# Patient Record
Sex: MALE | Race: WHITE | Employment: OTHER | URBAN - METROPOLITAN AREA
[De-identification: names, ages, dates, MRNs, and addresses within clinical notes are randomized per-mention and may not be internally consistent; named-entity substitution may affect disease eponyms.]

---

## 2017-01-05 ENCOUNTER — HOSPITAL ENCOUNTER (OUTPATIENT)
Dept: PHYSICAL THERAPY | Age: 72
Discharge: HOME | End: 2017-01-05
Attending: ORTHOPAEDIC SURGERY
Payer: MEDICARE

## 2017-01-05 NOTE — PROGRESS NOTES
PHYSICAL THERAPY    Patient unable to attend second appointment this week due to his schedule.     Tania Valladares, PT, DPT

## 2017-01-06 ENCOUNTER — HOSPITAL ENCOUNTER (OUTPATIENT)
Dept: PHYSICAL THERAPY | Age: 72
Discharge: HOME | End: 2017-01-06
Attending: ORTHOPAEDIC SURGERY
Payer: MEDICARE

## 2017-01-06 PROCEDURE — 97016 VASOPNEUMATIC DEVICE THERAPY: CPT

## 2017-01-06 PROCEDURE — 97110 THERAPEUTIC EXERCISES: CPT

## 2017-01-06 NOTE — PROGRESS NOTES
Samuel Pereira   (:1945) [address] at  [facility]  [address] [place] [address], [place], [address] [place] [address]  [phone]   [phone]         Outpatient PHYSICAL THERAPY: Daily Note  Fall Risk Score: 2 (? 5 = High Risk)    ICD-10: Treatment Diagnosis: complete rotator cuff tear or rupture of left shoulder, not specified as traumatic (M75.122)  ICD-10: Treatment Diagnosis 2: pain in left shoulder (M25.512)  ICD-10: Treatment Diagnosis 3: complete rotator cuff tear or rupture of right shoulder, not specified as traumatic (M75.121)  ICD-10: Treatment Diagnosis 4: pain in right shoulder (M25.511)  DATE: 2017  REFERRING PHYSICIAN: Rajinder Ferrell MD MD Orders: Evaluate and treat  Return Physician Appointment: 2017  MEDICAL/REFERRING DIAGNOSIS: Complete rotator cuff tear or rupture of left shoulder, not specified as traumatic [M75.122]  DATE OF ONSET: 2016   PRIOR LEVEL OF FUNCTION: independent  PRECAUTIONS: full massive rotator cuff tear- supraspinatus/ infraspinatus  ALLERGIES: No Known Allergies Patient denies allergy to latex, adhesives or creams. ASSESSMENT:  ?????? ? ? This section established at most recent assessment?????????? Patient is a 80year old male presenting to physical therapy with complaints of L shoulder pain and decreased functional mobility. Patient had MRI performed on 16 which indicated massive full thickness tear of supraspinatus and infraspinatus with 5 cm retraction, moderate degeneration and hypertrophy of AC joint and high riding humeral head- per chart review. Patient reports no incident of trauma or injury. He received an injection in his L shoulder in the end of September which he reports helping with pain and ROM. Patient states the injection is wearing off now and he has some increased pain.  He has at times had pain 10/10 with quick overhead motions, but currently reports his pain being controlled because he knows what to do or not do with his L UE. Now, patient rates pain 0/10 at rest up to 4/10 with overhead reach. Patient presents with increased pain, decreased strength, decreased ROM, decreased flexibility, impaired posture, impaired overhead reach, decreased activity tolerance, and overall impaired functional mobility. Patient is a good candidate for skilled physical therapy interventions to include manual therapy, therapeutic exercise, postural re-education, body mechanics training, and pain modalities as needed. Patient has been seen for 17 sessions of physical therapy from 10/28/16 to 12/29/16. He reports his L shoulder feeling much better since starting therapy with improvements in mobility, activity tolerance, and pain. We recently added his R shoulder into treatments and he reports minimal change thus far. Patient is motivated and using bilateral UEs as able at home and reports no interest in surgery at this time. Patient with improvements in strength, ROM, overall mobility, and activity tolerance. He has met many goals since starting therapy and is progressing with the rest. Patient should benefit from continued skilled therapy to address remaining strength and ROM deficits for bilateral shoulders. PROBLEM LIST (Impairments causing functional limitations):  1. Decreased Strength affecting function  2. Decreased ADL/Functional Activities  3. Decreased Flexibility/joint mobility  4. Decreased transfer abilities  5. Increased Pain affecting function  6. Decreased Activity tolerance   7. Decreased Pacing skills  8. Decreased Work Simplification/Energy Conservation techniques  GOALS: (Goals have been discussed and agreed upon with patient.)  SHORT-TERM FUNCTIONAL GOALS: Time Frame: 10/28/16 to 11/11/16   1. Patient will be independent with HEP to improve upright posture, overhead reach, and L UE ROM. -GOAL MET  2.  Patient will report no more than 0/10 L shoulder pain at rest in order to demonstrate improved self pain control and tolerance. -GOAL MET   3. Patient will improve upright posture including decreased forward head and rounded shoulders with improved L shoulder position in order to improve overhead reach. -GOAL MET  DISCHARGE GOALS: Time Frame: 12/29/16 to 1/27/17  1. Patient will improve gross overhead active ROM to 150 degrees in order to improve functional mobility and independence with ADLs. -ONGOING  2. Patient will report no more than 4/10 L shoulder pain with overhead motions in order to demonstrate improved activity tolerance. -GOAL MET   3. Patient will report minimal to no pain with driving in order to demonstrate improve functional mobility. -ONGOING (L improved to minimal pain, R with significant pain- 12/29/16)  4. 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 -ONGOING (reports moderate pain 12/29/16)  5. Patient will improve Disability of the Arm, Shoulder, and Hand score to 18/55 from 22/55. -ONGOING (scord 27/55 on 12/29/16)  6. NEW GOAL 12/29/16: Patient will report no more than 7/10 R shoulder pain with overhead motions in order to demonstrate improved activity tolerance. 7. NEW GOAL 12/29/16: Patient will report overall decreased R shoulder pain with improve mobility in order to return to prior functional level. REHABILITATION POTENTIAL FOR STATED GOALS: Michael Aburto OF CARE:  INTERVENTIONS PLANNED: (Benefits and precautions of physical therapy have been discussed with the patient.)  1. cold  2. heat  3. home exercise program (HEP)  4. manual therapy  5. neuromuscular re-education/strengthening  6. range of motion: active/assisted/passive  7. therapeutic activities  8. therapeutic exercise/strengthening  9. ultrasound  TREATMENT PLAN EFFECTIVE DATES: 12/29/16 to 1/27/17  FREQUENCY/DURATION: Follow patient 2 times a week for 4 weeks to address above goals.    Regarding Bakari Lorenz's therapy, I certify that the treatment plan above will be carried out by a therapist or under their direction. Thank you for this referral,  Ruchi Willard PT     Referring Physician Signature: Rajinder Ferrell MD          Date                       SUBJECTIVE:  \"My right shoulder is still bothering me more and its really hard to raise it up. I'm just waiting on it to start getting better like the left one. \"     Lo Redo from Dr. Carolyn Reddy: add in Right shoulder physical therapy 2 times a week for 4 weeks (12/21/16)    History of Present Injury/Illness (Reason for Referral): Patient is a 80year old male presenting to physical therapy with complaints of L shoulder pain and decreased functional mobility. Patient had MRI performed on 9/2/16 which indicated massive full thickness tear of supraspinatus and infraspinatus with 5 cm retraction, moderate degeneration and hypertrophy of AC joint and high riding humeral head- per chart review. Patient reports no incident of trauma or injury. He received an injection in his L shoulder in the end of September which he reports helping with pain and ROM. Patient states the injection is wearing off now and he has some increased pain. He has at times had pain 10/10 with quick overhead motions, but currently reports his pain being controlled because he knows what to do or not do with his L UE. Now, patient rates pain 0/10 at rest up to 4/10 with overhead reach. Patient presents with increased pain, decreased strength, decreased ROM, decreased flexibility, impaired posture, impaired overhead reach, decreased activity tolerance, and overall impaired functional mobility. Present Symptoms: aching, burning, tingling   Pain Intensity 1: 4  Pain Location 1: Shoulder  Pain Orientation 1: Left  Pain Intensity 2: 7  Pain Location 2: Shoulder  Pain Orientation 2: Right  Dominant Side: left  Past Medical History: Mr. Alana Goel  has a past medical history of Acute maxillary sinusitis; Acute pharyngitis;  Aftercare for long-term (current) use of antiplatelets/antithrombotics (9/29/2016); Allergic rhinitis; Anemia (9/29/2016); Backache; CAD (coronary artery disease) (9/29/2016); Carotid artery disease ([facility] (9/29/2016); Decreased libido; Diabetes ([facility] (9/29/2016); Dizziness and giddiness; ED (erectile dysfunction) (9/29/2016); Encounter for monitoring testosterone replacement therapy (9/29/2016); Fatigue; Fever blister; GERD (gastroesophageal reflux disease); Glaucoma (9/29/2016); Hearing difficulty of right ear (9/29/2016); High cholesterol; High risk medication use (9/29/2016); Hypertension (9/29/2016); Hyponatremia (9/29/2016); IBS (irritable bowel syndrome) (9/29/2016); Intertrigo (9/29/2016); Irregular heart beat (9/29/2016); Left shoulder pain; Mouth pain; Obstructive sleep apnea of adult (9/29/2016); Osteoporosis (9/29/2016); Palpitations; Plantar wart of left foot (9/29/2016); Rotator cuff tear; Sinus bradycardia (9/29/2016); Sinusitis; Skin infection; Sore throat; Special screening for malignant neoplasm of prostate; Subclinical hyperthyroidism; and Tinea corporis. He also  has a past surgical history that includes urological.    Current Medications:   Current Outpatient Prescriptions on File Prior to Encounter   Medication Sig Dispense Refill    latanoprost (XALATAN) 0.005 % ophthalmic solution USE 1 DROP IN BOTH EYES EVERY EVENING  4    denosumab (PROLIA) 60 mg/mL injection 60 mg by SubCUTAneous route. Last dose 9/19/2016      bisoprolol (ZEBETA) 5 mg tablet Take 0.5-1 Tabs by mouth daily. Indications: HYPERTENSION 90 Tab 3    metoclopramide HCl (REGLAN) 10 mg tablet Take 1 Tab by mouth daily. Takes once a day at bedtime 90 Tab 3    rosuvastatin (CRESTOR) 40 mg tablet Take 0.5-1 Tabs by mouth nightly. Indications: HYPERLIPIDEMIA 90 Tab 3    omega 3-dha-epa-fish oil (FISH OIL) 900-1,400 mg cpDR Take 2 Caps by mouth daily for 90 days.  Last dose 01/14/10   Indications: ARTERIOSCLEROTIC VASCULAR DISEASE PREVENTION 180 Cap 3    calcium citrate-vitamin D3 (CITRACAL + D) tablet One am(with centrum) and two pm   Indications: OSTEOPOROSIS 90 Tab 3    esomeprazole (NEXIUM) 20 mg capsule Take  by mouth daily. OTC, in the AM and before dinner to lower stomach acid.  sildenafil citrate (VIAGRA) 100 mg tablet One tablet every day for ED      magnesium oxide (MAG-OX) 400 mg tablet One tablet daily to prevent or treat low magnesium      timolol (TIMOPTIC) 0.25 % ophthalmic solution Administer 1 Drop to both eyes daily.  ranitidine (ZANTAC) 300 mg tablet Take 300 mg by mouth daily. Takes at bedtime       LATANOPROST (XALATAN OP) Apply 1 Drop to eye daily. One drop to each eye       aspirin 81 mg Tab Take 81 mg by mouth daily. Last dose 01/14/10       multivitamin (ONE A DAY) tablet Take 1 Tab by mouth daily. Centrum silver last dose 01/14/10        No current facility-administered medications on file prior to encounter. Date Last Reviewed: 1/6/2017    Social History/Home Situation: Patient lives in a private residence with his wife who can help him out as needed. He reports being modified independent at home with increased time needed for ADLs. Work/Activity History: Part time . OBJECTIVE:    Outcome Measure: Tool Used: Disabilities of the Arm, Shoulder and Hand (DASH) Questionnaire - Quick Version  Score:  Initial: 22/55  Most Recent: 23/55 (Date: (11/23/16 )                          31/55 (Date: 12/22/16)                          27/55 (Date: 12/29/16)   Interpretation of Score: The DASH is designed to measure the activities of daily living in person's with upper extremity dysfunction or pain. Each section is scored on a 1-5 scale, 5 representing the greatest disability. The scores of each section are added together for a total score of 55. Score 11 12-19 20-28 29-37 38-45 46-54 55   Modifier CH CI CJ CK CL CM CN     ?  Carrying, Moving, and Handling Objects:     - CURRENT STATUS: CJ - 20%-39% impaired, limited or restricted    - GOAL STATUS:  CI - 1%-19% impaired, limited or restricted    - D/C STATUS:  ---------------To be determined---------------      Observation/Postural and Gait Assessment: In standing: patient with forward head, rounded shoulders, L shoulder elevated with increase tension in L upper trapezius, L scapula depressed and protraction. Slumped posture in sitting- able to correct with cuing, but difficulty holding posture due to decreased endurance of postural muscles. Palpation:  Moderate tenderness with moderate tightness noted in L upper trapezius. Moderate tenderness to palpation of L teres minor and pectoralis muscles. Moderate tightness and tenderness in R upper trapezius and levator scapula. Moderate to significant tenderness R teres minor. ROM:   PROM Left (degrees) Right (degrees)   Shoulder Flexion 140 140   Shoulder Abduction 130 130   Shoulder Internal Rotation  80 85   Functional Internal Rotation L1 L5   Shoulder External Rotation 57 50   Functional External Rotation C7 (from C5) T2     Strength: Motion Tested Left   (*/5) Right  (*/5)   Shoulder Flexion 4 (from 4-) 4- (from 4)   Scaption 4 (from 4-) 4- (from 4)   Shoulder Abduction 5 (from 4+) 4-   Shoulder Internal Rotation  4+ 4   Shoulder External Rotation 2+ 3- (from 4-)   Elbow Flexion 5 5   Elbow Extension 5 5    (in lbs): arm at side with elbow flexed to 90 degrees 70 65     Special Tests:  Not formally assessed due to MRI results with massive rotator cuff tear and increased L shoulder pain with certain positions. Passive Accessory Motion Testing: Significant tightness with inferior and posterior glenohumeral joint mobilizations. Neurological Screen:  Myotomes: Key muscle strength testing for bilateral UE is WNL. Dermatomes: Sensation testing through bilateral UE for light touch is intact from C3 to T2.   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 Balance: Sitting and standing balance grossly intact. TREATMENT:    (In addition to Assessment/Re-Assessment sessions the following treatments were rendered)  Therapeutic Exercise: (30 Minutes): Exercises per grid below to improve mobility, strength and coordination. Required minimal verbal cues to promote proper body alignment, promote proper body posture and promote proper body mechanics. Progressed resistance, range, repetitions and complexity of movement as indicated. Date:  12/27/16 Date:  12/29/16 Date:  1/6/17   Activity/Exercise Parameters Parameters Parameters   Scapular retractions X 20 --- X 20    Wall slides X 15 reps  No lift, 2 x 10 Wall slides, no lift, x 15   UBE Level 3, 3 minutes forward, 3 minutes backward Level 4, 4 minutes forward, 4 minutes backwards Level 4, 4 minutes forward, 4 minutes backwards   Finger ladder X 5 reps each shoulder x 10 sec hold at top ---  ---    Wall push ups 2 x 15 X 20 X 30    Band rows Green x 30 reps  Blue band, 2 x 10 Blue and red band, 2 x 10   Band low row Green x 30 reps  Green band, 2 x 10  Blue band, 3 x 10   Shoulder flexion Seated in chair both hands on blue band x 10 reps 5 sec hold  Ball roll on wall, bilateral, 10 x 5 seconds Ball roll on wall, bilateral, 15 x 5 seconds   Shoulder scaption Active 2x10 reps shoulder height  --- ---   Bicep curl Cable cord 7.5 lb. --- Cable cord, 7.5 lbs, each arm to fatigue   Tricep extension Cable cord 7.5 lb.  2x15  --- Cable cord, 7.5 lbs, each arm to fatigue   Noni ----- X 5 minutes X 5 minutes   Band internal rotation Red, 2 x 10 each L green, 2 x 10  R red, 2 x 10 ---   Band external rotation Bilateral, hands close, gentle contraction, 2 x 10 Bilateral hands close, gentle contraction 2x10 reps  Bilateral, hands close, gentle contraction, red, x 20   Wand external rotation --- Bilateral, 15 x 5 seconds each Bilateral, back against wall, 15 x 5 seconds each         Manual Therapy (      ): none today    Therapeutic Modalities: for pain/ edema:                  Right Shoulder Cold  Type:  (vasopneumatic compression)  Duration : 15 minutes  Patient Position: Sitting     Left Shoulder Cold  Type: Cold/ice pack  Duration : 15 minutes  Patient Position: Sitting                                                                      HEP: As above; handouts given to patient for all exercises. ______________________________________________________________________________________________________    Treatment Assessment: Patient tolerated all exercises well today. Some increased R shoulder pain with activities, but reported 7/10 pain following all exercises. Patient progressing well overall, but continues to have increased R shoulder pain throughout the day. Progression/Medical Necessity:   · Patient is expected to demonstrate progress in strength, range of motion, coordination and functional technique to increase independence with ADLs  and improve safety during overhead reach, ADLs, and recreational activities. · Skilled intervention continues to be required due to increased L shoulder pain with overhead reach, ADLs, and recreational activities. Compliance with Program/Exercises: Compliant most of the time. Reason for Continuation of Services/Other Comments:  · Patient continues to require skilled intervention due to increased L shoulder pain with decreased ROM hindering activity tolerance, functional mobility, and overall quality of life. Recommendations/Intent for next treatment session: \"Treatment next visit will focus on advancements to more challenging activities\". Manual therapy and modalities as needed for pain control and ROM.  Progress postural exercises as tolerated. Strengthening and compensatory movements as needed.     Re-certification 58/82/95: continue physical therapy 2 times a week for 4 weeks for bilateral shoulders    Total Treatment Duration: 55 minutes   PT Patient Time In/Time Out  Time In: 0905  Time Out: Lexie Rubin 211, PT

## 2017-01-09 ENCOUNTER — HOSPITAL ENCOUNTER (OUTPATIENT)
Dept: PHYSICAL THERAPY | Age: 72
Discharge: HOME | End: 2017-01-09
Attending: ORTHOPAEDIC SURGERY
Payer: MEDICARE

## 2017-01-09 PROCEDURE — 97110 THERAPEUTIC EXERCISES: CPT

## 2017-01-09 PROCEDURE — 97140 MANUAL THERAPY 1/> REGIONS: CPT

## 2017-01-09 NOTE — PROGRESS NOTES
Dion Frausto   [phone]) [phone] at  [address], [address]  [phone]   [phone]         Outpatient PHYSICAL THERAPY: Daily Note  Fall Risk Score: 2 (? 5 = High Risk)    ICD-10: Treatment Diagnosis: complete rotator cuff tear or rupture of left shoulder, not specified as traumatic (M75.122)  ICD-10: Treatment Diagnosis 2: pain in left shoulder (M25.512)  ICD-10: Treatment Diagnosis 3: complete rotator cuff tear or rupture of right shoulder, not specified as traumatic (M75.121)  ICD-10: Treatment Diagnosis 4: pain in right shoulder (M25.511)  DATE: 2017  REFERRING PHYSICIAN: Diogo Angel MD MD Orders: Evaluate and treat  Return Physician Appointment: 2017  MEDICAL/REFERRING DIAGNOSIS: Complete rotator cuff tear or rupture of left shoulder, not specified as traumatic [M75.122]  DATE OF ONSET: 2016   PRIOR LEVEL OF FUNCTION: independent  PRECAUTIONS: full massive rotator cuff tear- supraspinatus/ infraspinatus  ALLERGIES: No Known Allergies Patient denies allergy to latex, adhesives or creams. ASSESSMENT:  ?????? ? ? This section established at most recent assessment?????????? Patient is a 80year old male presenting to physical therapy with complaints of L shoulder pain and decreased functional mobility. Patient had MRI performed on 16 which indicated massive full thickness tear of supraspinatus and infraspinatus with 5 cm retraction, moderate degeneration and hypertrophy of AC joint and high riding humeral head- per chart review. Patient reports no incident of trauma or injury. He received an injection in his L shoulder in the end of September which he reports helping with pain and ROM. Patient states the injection is wearing off now and he has some increased pain.  He has at times had pain 10/10 with quick overhead motions, but currently reports his pain being controlled because he knows what to do or not do with his L UE. Now, patient rates pain 0/10 at rest up to 4/10 with overhead reach. Patient presents with increased pain, decreased strength, decreased ROM, decreased flexibility, impaired posture, impaired overhead reach, decreased activity tolerance, and overall impaired functional mobility. Patient is a good candidate for skilled physical therapy interventions to include manual therapy, therapeutic exercise, postural re-education, body mechanics training, and pain modalities as needed. Patient has been seen for 17 sessions of physical therapy from 10/28/16 to 12/29/16. He reports his L shoulder feeling much better since starting therapy with improvements in mobility, activity tolerance, and pain. We recently added his R shoulder into treatments and he reports minimal change thus far. Patient is motivated and using bilateral UEs as able at home and reports no interest in surgery at this time. Patient with improvements in strength, ROM, overall mobility, and activity tolerance. He has met many goals since starting therapy and is progressing with the rest. Patient should benefit from continued skilled therapy to address remaining strength and ROM deficits for bilateral shoulders. PROBLEM LIST (Impairments causing functional limitations):  1. Decreased Strength affecting function  2. Decreased ADL/Functional Activities  3. Decreased Flexibility/joint mobility  4. Decreased transfer abilities  5. Increased Pain affecting function  6. Decreased Activity tolerance   7. Decreased Pacing skills  8. Decreased Work Simplification/Energy Conservation techniques  GOALS: (Goals have been discussed and agreed upon with patient.)  SHORT-TERM FUNCTIONAL GOALS: Time Frame: 10/28/16 to 11/11/16   1. Patient will be independent with HEP to improve upright posture, overhead reach, and L UE ROM. -GOAL MET  2.  Patient will report no more than 0/10 L shoulder pain at rest in order to demonstrate improved self pain control and tolerance. -GOAL MET   3. Patient will improve upright posture including decreased forward head and rounded shoulders with improved L shoulder position in order to improve overhead reach. -GOAL MET  DISCHARGE GOALS: Time Frame: 12/29/16 to 1/27/17  1. Patient will improve gross overhead active ROM to 150 degrees in order to improve functional mobility and independence with ADLs. -ONGOING  2. Patient will report no more than 4/10 L shoulder pain with overhead motions in order to demonstrate improved activity tolerance. -GOAL MET   3. Patient will report minimal to no pain with driving in order to demonstrate improve functional mobility. -ONGOING (L improved to minimal pain, R with significant pain- 12/29/16)  4. 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 -ONGOING (reports moderate pain 12/29/16)  5. Patient will improve Disability of the Arm, Shoulder, and Hand score to 18/55 from 22/55. -ONGOING (scord 27/55 on 12/29/16)  6. NEW GOAL 12/29/16: Patient will report no more than 7/10 R shoulder pain with overhead motions in order to demonstrate improved activity tolerance. 7. NEW GOAL 12/29/16: Patient will report overall decreased R shoulder pain with improve mobility in order to return to prior functional level. REHABILITATION POTENTIAL FOR STATED GOALS: Hali Gonzalez OF CARE:  INTERVENTIONS PLANNED: (Benefits and precautions of physical therapy have been discussed with the patient.)  1. cold  2. heat  3. home exercise program (HEP)  4. manual therapy  5. neuromuscular re-education/strengthening  6. range of motion: active/assisted/passive  7. therapeutic activities  8. therapeutic exercise/strengthening  9. ultrasound  TREATMENT PLAN EFFECTIVE DATES: 12/29/16 to 1/27/17  FREQUENCY/DURATION: Follow patient 2 times a week for 4 weeks to address above goals.    Regarding Cherry Lorenz's therapy, I certify that the treatment plan above will be carried out by a therapist or under their direction. Thank you for this referral,  Blaze Fofana PTA     Referring Physician Signature: Sloan Lawrence MD          Date                       SUBJECTIVE:  Pt. Stated he had increased pain today due to weather. New Script from Dr. Camryn Reid: add in Right shoulder physical therapy 2 times a week for 4 weeks (12/21/16)    History of Present Injury/Illness (Reason for Referral): Patient is a 80year old male presenting to physical therapy with complaints of L shoulder pain and decreased functional mobility. Patient had MRI performed on 9/2/16 which indicated massive full thickness tear of supraspinatus and infraspinatus with 5 cm retraction, moderate degeneration and hypertrophy of AC joint and high riding humeral head- per chart review. Patient reports no incident of trauma or injury. He received an injection in his L shoulder in the end of September which he reports helping with pain and ROM. Patient states the injection is wearing off now and he has some increased pain. He has at times had pain 10/10 with quick overhead motions, but currently reports his pain being controlled because he knows what to do or not do with his L UE. Now, patient rates pain 0/10 at rest up to 4/10 with overhead reach. Patient presents with increased pain, decreased strength, decreased ROM, decreased flexibility, impaired posture, impaired overhead reach, decreased activity tolerance, and overall impaired functional mobility. Present Symptoms: aching, burning, tingling   Pain Intensity 1: 4  Pain Location 1: Shoulder  Pain Orientation 1: Left  Pain Intensity 2: 7  Pain Location 2: Shoulder  Pain Orientation 2: Right  Dominant Side: left  Past Medical History: Mr. Alfonso Egan  has a past medical history of Acute maxillary sinusitis; Acute pharyngitis; Aftercare for long-term (current) use of antiplatelets/antithrombotics (9/29/2016); Allergic rhinitis; Anemia (9/29/2016);  Backache; CAD (coronary artery disease) (9/29/2016); Carotid artery disease ([facility] (9/29/2016); Decreased libido; [facility] (9/29/2016); Dizziness and giddiness; ED (erectile dysfunction) (9/29/2016); Encounter for monitoring testosterone replacement therapy (9/29/2016); Fatigue; Fever blister; GERD (gastroesophageal reflux disease); Glaucoma (9/29/2016); Hearing difficulty of right ear (9/29/2016); High cholesterol; High risk medication use (9/29/2016); Hypertension (9/29/2016); Hyponatremia (9/29/2016); IBS (irritable bowel syndrome) (9/29/2016); Intertrigo (9/29/2016); Irregular heart beat (9/29/2016); Left shoulder pain; Mouth pain; Obstructive sleep apnea of adult (9/29/2016); Osteoporosis (9/29/2016); Palpitations; Plantar wart of left foot (9/29/2016); Rotator cuff tear; Sinus bradycardia (9/29/2016); Sinusitis; Skin infection; Sore throat; Special screening for malignant neoplasm of prostate; Subclinical hyperthyroidism; and Tinea corporis. He also  has a past surgical history that includes urological.    Current Medications:   Current Outpatient Prescriptions on File Prior to Encounter   Medication Sig Dispense Refill    latanoprost (XALATAN) 0.005 % ophthalmic solution USE 1 DROP IN BOTH EYES EVERY EVENING  4    denosumab (PROLIA) 60 mg/mL injection 60 mg by SubCUTAneous route. Last dose 9/19/2016      bisoprolol (ZEBETA) 5 mg tablet Take 0.5-1 Tabs by mouth daily. Indications: HYPERTENSION 90 Tab 3    metoclopramide HCl (REGLAN) 10 mg tablet Take 1 Tab by mouth daily. Takes once a day at bedtime 90 Tab 3    rosuvastatin (CRESTOR) 40 mg tablet Take 0.5-1 Tabs by mouth nightly. Indications: HYPERLIPIDEMIA 90 Tab 3    omega 3-dha-epa-fish oil (FISH OIL) 900-1,400 mg cpDR Take 2 Caps by mouth daily for 90 days.  Last dose 01/14/10   Indications: ARTERIOSCLEROTIC VASCULAR DISEASE PREVENTION 180 Cap 3    calcium citrate-vitamin D3 (CITRACAL + D) tablet One am(with centrum) and two pm   Indications: OSTEOPOROSIS 90 Tab 3    esomeprazole (NEXIUM) 20 mg capsule Take  by mouth daily. OTC, in the AM and before dinner to lower stomach acid.  sildenafil citrate (VIAGRA) 100 mg tablet One tablet every day for ED      magnesium oxide (MAG-OX) 400 mg tablet One tablet daily to prevent or treat low magnesium      timolol (TIMOPTIC) 0.25 % ophthalmic solution Administer 1 Drop to both eyes daily.  ranitidine (ZANTAC) 300 mg tablet Take 300 mg by mouth daily. Takes at bedtime       LATANOPROST (XALATAN OP) Apply 1 Drop to eye daily. One drop to each eye       aspirin 81 mg Tab Take 81 mg by mouth daily. Last dose 01/14/10       multivitamin (ONE A DAY) tablet Take 1 Tab by mouth daily. Centrum silver last dose 01/14/10        No current facility-administered medications on file prior to encounter. Date Last Reviewed: 1/9/2017    Social History/Home Situation: Patient lives in a private residence with his wife who can help him out as needed. He reports being modified independent at home with increased time needed for ADLs. Work/Activity History: Part time . OBJECTIVE:    Outcome Measure: Tool Used: Disabilities of the Arm, Shoulder and Hand (DASH) Questionnaire - Quick Version  Score:  Initial: 22/55  Most Recent: 23/55 (Date: (11/23/16 )                          31/55 (Date: 12/22/16)                          27/55 (Date: 12/29/16)   Interpretation of Score: The DASH is designed to measure the activities of daily living in person's with upper extremity dysfunction or pain. Each section is scored on a 1-5 scale, 5 representing the greatest disability. The scores of each section are added together for a total score of 55. Score 11 12-19 20-28 29-37 38-45 46-54 55   Modifier CH CI CJ CK CL CM CN     ?  Carrying, Moving, and Handling Objects:     - CURRENT STATUS: CJ - 20%-39% impaired, limited or restricted    - GOAL STATUS:  CI - 1%-19% impaired, limited or restricted    - D/C STATUS:  ---------------To be determined---------------      Observation/Postural and Gait Assessment: In standing: patient with forward head, rounded shoulders, L shoulder elevated with increase tension in L upper trapezius, L scapula depressed and protraction. Slumped posture in sitting- able to correct with cuing, but difficulty holding posture due to decreased endurance of postural muscles. Palpation:  Moderate tenderness with moderate tightness noted in L upper trapezius. Moderate tenderness to palpation of L teres minor and pectoralis muscles. Moderate tightness and tenderness in R upper trapezius and levator scapula. Moderate to significant tenderness R teres minor. ROM:   PROM Left (degrees) Right (degrees)   Shoulder Flexion 140 140   Shoulder Abduction 130 130   Shoulder Internal Rotation  80 85   Functional Internal Rotation L1 L5   Shoulder External Rotation 57 50   Functional External Rotation C7 (from C5) T2     Strength: Motion Tested Left   (*/5) Right  (*/5)   Shoulder Flexion 4 (from 4-) 4- (from 4)   Scaption 4 (from 4-) 4- (from 4)   Shoulder Abduction 5 (from 4+) 4-   Shoulder Internal Rotation  4+ 4   Shoulder External Rotation 2+ 3- (from 4-)   Elbow Flexion 5 5   Elbow Extension 5 5    (in lbs): arm at side with elbow flexed to 90 degrees 70 65     Special Tests:  Not formally assessed due to MRI results with massive rotator cuff tear and increased L shoulder pain with certain positions. Passive Accessory Motion Testing: Significant tightness with inferior and posterior glenohumeral joint mobilizations. Neurological Screen:  Myotomes: Key muscle strength testing for bilateral UE is WNL. Dermatomes: Sensation testing through bilateral UE for light touch is intact from C3 to T2.   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 Balance: Sitting and standing balance grossly intact. TREATMENT:    (In addition to Assessment/Re-Assessment sessions the following treatments were rendered)  Therapeutic Exercise: ( ):x 45 mins  Exercises per grid below to improve mobility, strength and coordination. Required minimal verbal cues to promote proper body alignment, promote proper body posture and promote proper body mechanics. Progressed resistance, range, repetitions and complexity of movement as indicated. Date:  1/9/17 Date:  12/29/16 Date:  1/6/17   Activity/Exercise Parameters Parameters Parameters   Scapular retractions X 20 --- X 20    Wall slides X 15 reps  No lift, 2 x 10 Wall slides, no lift, x 15   UBE Level 4, 4 minutes forward, 4 minutes backward Level 4, 4 minutes forward, 4 minutes backwards Level 4, 4 minutes forward, 4 minutes backwards   Finger ladder X 5 reps each shoulder x 10 sec hold at top ---  ---    Wall push ups 2 x 15 X 20 X 30    Band rows Blue  x 30 reps  Blue band, 2 x 10 Blue and red band, 2 x 10   Band low row Blue  x 30 reps  Green band, 2 x 10  Blue band, 3 x 10   Shoulder flexion Seated in chair both hands on blue band x 10 reps 5 sec hold  Ball roll on wall, bilateral, 10 x 5 seconds Ball roll on wall, bilateral, 15 x 5 seconds   Shoulder scaption Active 2x10 reps shoulder height  --- ---   Bicep curl Cable cord 7.5 lb. --- Cable cord, 7.5 lbs, each arm to fatigue   Tricep extension Cable bar 10 lb.  2x15  --- Cable cord, 7.5 lbs, each arm to fatigue   Noni ----- X 5 minutes X 5 minutes   Band internal rotation Green , 2 x 10 e 20 reps  L green, 2 x 10  R red, 2 x 10 ---   Band external rotation Bilateral, hands close, gentle contraction, 2 x 10 Bilateral hands close, gentle contraction 2x10 reps  Bilateral, hands close, gentle contraction, red, x 20   Wand external rotation bileteral 20 reps 5 sec hold each Bilateral, 15 x 5 seconds each Bilateral, back against wall, 15 x 5 seconds each         Manual Therapy (      ): x 10 mins Pt. Received PROM to bilateral shoulders in all planes to tolerance. Gentle oscillation and distraction performed to bilateral UE's to increase range of motion and decrease pain. Therapeutic Modalities: for pain/ edema: Pt. Received hot pack x 8 mins to bilateral shoulders initially and ice pack to bilateral shoulders after session today x 8 mins. HEP: As above; handouts given to patient for all exercises. ______________________________________________________________________________________________________    Treatment Assessment:Pt. Was compliant with all exercises and rated pain the same as in the beginning. Progression/Medical Necessity:   · Patient is expected to demonstrate progress in strength, range of motion, coordination and functional technique to increase independence with ADLs  and improve safety during overhead reach, ADLs, and recreational activities. · Skilled intervention continues to be required due to increased L shoulder pain with overhead reach, ADLs, and recreational activities. Compliance with Program/Exercises: Compliant most of the time. Reason for Continuation of Services/Other Comments:  · Patient continues to require skilled intervention due to increased L shoulder pain with decreased ROM hindering activity tolerance, functional mobility, and overall quality of life. Recommendations/Intent for next treatment session: \"Treatment next visit will focus on advancements to more challenging activities\". Manual therapy and modalities as needed for pain control and ROM. Progress postural exercises as tolerated.  Strengthening and compensatory movements as needed.     Re-certification 03/05/[birth year]: continue physical therapy 2 times a week for 4 weeks for bilateral shoulders    Total Treatment Duration: 55 minutes   PT Patient Time In/Time Out  Time In: 0900  Time Out: [address], PTA

## 2017-01-11 ENCOUNTER — HOSPITAL ENCOUNTER (OUTPATIENT)
Dept: PHYSICAL THERAPY | Age: 72
Discharge: HOME | End: 2017-01-11
Attending: ORTHOPAEDIC SURGERY
Payer: MEDICARE

## 2017-01-11 PROCEDURE — 97016 VASOPNEUMATIC DEVICE THERAPY: CPT

## 2017-01-11 PROCEDURE — 97110 THERAPEUTIC EXERCISES: CPT

## 2017-01-11 PROCEDURE — 97140 MANUAL THERAPY 1/> REGIONS: CPT

## 2017-01-11 NOTE — PROGRESS NOTES
Cuco Andrews   [phone]) [address]  at  [address]  [phone]   [phone]         Outpatient PHYSICAL THERAPY: Daily Note  Fall Risk Score: 2 (? 5 = High Risk)    ICD-10: Treatment Diagnosis: complete rotator cuff tear or rupture of left shoulder, not specified as traumatic (M75.122)  ICD-10: Treatment Diagnosis 2: pain in left shoulder (M25.512)  ICD-10: Treatment Diagnosis 3: complete rotator cuff tear or rupture of right shoulder, not specified as traumatic (M75.121)  ICD-10: Treatment Diagnosis 4: pain in right shoulder (M25.511)  DATE: 2017  REFERRING PHYSICIAN: Agus Carias MD MD Orders: Evaluate and treat  Return Physician Appointment: 2017  MEDICAL/REFERRING DIAGNOSIS: Complete rotator cuff tear or rupture of left shoulder, not specified as traumatic [M75.122]  DATE OF ONSET: 2016   PRIOR LEVEL OF FUNCTION: independent  PRECAUTIONS: full massive rotator cuff tear- supraspinatus/ infraspinatus  ALLERGIES: No Known Allergies Patient denies allergy to latex, adhesives or creams. ASSESSMENT:  ?????? ? ? This section established at most recent assessment?????????? Patient is a 80year old male presenting to physical therapy with complaints of L shoulder pain and decreased functional mobility. Patient had MRI performed on 16 which indicated massive full thickness tear of supraspinatus and infraspinatus with 5 cm retraction, moderate degeneration and hypertrophy of AC joint and high riding humeral head- per chart review. Patient reports no incident of trauma or injury. He received an injection in his L shoulder in the end of September which he reports helping with pain and ROM. Patient states the injection is wearing off now and he has some increased pain.  He has at times had pain 10/10 with quick overhead motions, but currently reports his pain being controlled because he knows what to do or not do with his L UE. Now, patient rates pain 0/10 at rest up to 4/10 with overhead reach. Patient presents with increased pain, decreased strength, decreased ROM, decreased flexibility, impaired posture, impaired overhead reach, decreased activity tolerance, and overall impaired functional mobility. Patient is a good candidate for skilled physical therapy interventions to include manual therapy, therapeutic exercise, postural re-education, body mechanics training, and pain modalities as needed. Patient has been seen for 17 sessions of physical therapy from 10/28/16 to 12/29/16. He reports his L shoulder feeling much better since starting therapy with improvements in mobility, activity tolerance, and pain. We recently added his R shoulder into treatments and he reports minimal change thus far. Patient is motivated and using bilateral UEs as able at home and reports no interest in surgery at this time. Patient with improvements in strength, ROM, overall mobility, and activity tolerance. He has met many goals since starting therapy and is progressing with the rest. Patient should benefit from continued skilled therapy to address remaining strength and ROM deficits for bilateral shoulders. PROBLEM LIST (Impairments causing functional limitations):  1. Decreased Strength affecting function  2. Decreased ADL/Functional Activities  3. Decreased Flexibility/joint mobility  4. Decreased transfer abilities  5. Increased Pain affecting function  6. Decreased Activity tolerance   7. Decreased Pacing skills  8. Decreased Work Simplification/Energy Conservation techniques  GOALS: (Goals have been discussed and agreed upon with patient.)  SHORT-TERM FUNCTIONAL GOALS: Time Frame: 10/28/16 to 11/11/16   1. Patient will be independent with HEP to improve upright posture, overhead reach, and L UE ROM. -GOAL MET  2.  Patient will report no more than 0/10 L shoulder pain at rest in order to demonstrate improved self pain control and tolerance. -GOAL MET   3. Patient will improve upright posture including decreased forward head and rounded shoulders with improved L shoulder position in order to improve overhead reach. -GOAL MET  DISCHARGE GOALS: Time Frame: 12/29/16 to 1/27/17  1. Patient will improve gross overhead active ROM to 150 degrees in order to improve functional mobility and independence with ADLs. -ONGOING  2. Patient will report no more than 4/10 L shoulder pain with overhead motions in order to demonstrate improved activity tolerance. -GOAL MET   3. Patient will report minimal to no pain with driving in order to demonstrate improve functional mobility. -ONGOING (L improved to minimal pain, R with significant pain- 12/29/16)  4. 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 -ONGOING (reports moderate pain 12/29/16)  5. Patient will improve Disability of the Arm, Shoulder, and Hand score to 18/55 from 22/55. -ONGOING (scord 27/55 on 12/29/16)  6. NEW GOAL 12/29/16: Patient will report no more than 7/10 R shoulder pain with overhead motions in order to demonstrate improved activity tolerance. 7. NEW GOAL 12/29/16: Patient will report overall decreased R shoulder pain with improve mobility in order to return to prior functional level. REHABILITATION POTENTIAL FOR STATED GOALS: Hali Gonzalez OF CARE:  INTERVENTIONS PLANNED: (Benefits and precautions of physical therapy have been discussed with the patient.)  1. cold  2. heat  3. home exercise program (HEP)  4. manual therapy  5. neuromuscular re-education/strengthening  6. range of motion: active/assisted/passive  7. therapeutic activities  8. therapeutic exercise/strengthening  9. ultrasound  TREATMENT PLAN EFFECTIVE DATES: 12/29/16 to 1/27/17  FREQUENCY/DURATION: Follow patient 2 times a week for 4 weeks to address above goals.    Regarding Cherry Lorenz's therapy, I certify that the treatment plan above will be carried out by a therapist or under their direction. Thank you for this referral,  Sherman Villatoro, GIULIANO     Referring Physician Signature: Mark Coburn MD          Date                       SUBJECTIVE:  \"My shoulders are still hurting. This right one is bothering me a lot. I worked last night and I noticed that it does not like to drive. I can keep my right hand on the bottom of the steering wheel, but turning any I have to use my left arm. \"    Jessi Emerson from Dr. Jessi Clay: add in Right shoulder physical therapy 2 times a week for 4 weeks (12/21/16)    History of Present Injury/Illness (Reason for Referral): Patient is a 80year old male presenting to physical therapy with complaints of L shoulder pain and decreased functional mobility. Patient had MRI performed on 9/2/16 which indicated massive full thickness tear of supraspinatus and infraspinatus with 5 cm retraction, moderate degeneration and hypertrophy of AC joint and high riding humeral head- per chart review. Patient reports no incident of trauma or injury. He received an injection in his L shoulder in the end of September which he reports helping with pain and ROM. Patient states the injection is wearing off now and he has some increased pain. He has at times had pain 10/10 with quick overhead motions, but currently reports his pain being controlled because he knows what to do or not do with his L UE. Now, patient rates pain 0/10 at rest up to 4/10 with overhead reach. Patient presents with increased pain, decreased strength, decreased ROM, decreased flexibility, impaired posture, impaired overhead reach, decreased activity tolerance, and overall impaired functional mobility.   Present Symptoms: aching, burning, tingling   Pain Intensity 1: 4  Pain Location 1: Shoulder  Pain Orientation 1: Left  Pain Intensity 2: 8  Pain Location 2: Shoulder  Pain Orientation 2: Right  Dominant Side: left  Past Medical History: Mr. Carmel Hawthorne  has a past medical history of Acute maxillary sinusitis; Acute pharyngitis; Aftercare for long-term (current) use of antiplatelets/antithrombotics (9/29/2016); Allergic rhinitis; Anemia (9/29/2016); Backache; CAD (coronary artery disease) (9/29/2016); Carotid artery disease ([facility] (9/29/2016); Decreased libido; Diabetes ([facility] (9/29/2016); Dizziness and giddiness; ED (erectile dysfunction) (9/29/2016); Encounter for monitoring testosterone replacement therapy (9/29/2016); Fatigue; Fever blister; GERD (gastroesophageal reflux disease); Glaucoma (9/29/2016); Hearing difficulty of right ear (9/29/2016); High cholesterol; High risk medication use (9/29/2016); Hypertension (9/29/2016); Hyponatremia (9/29/2016); IBS (irritable bowel syndrome) (9/29/2016); Intertrigo (9/29/2016); Irregular heart beat (9/29/2016); Left shoulder pain; Mouth pain; Obstructive sleep apnea of adult (9/29/2016); Osteoporosis (9/29/2016); Palpitations; Plantar wart of left foot (9/29/2016); Rotator cuff tear; Sinus bradycardia (9/29/2016); Sinusitis; Skin infection; Sore throat; Special screening for malignant neoplasm of prostate; Subclinical hyperthyroidism; and Tinea corporis. He also  has a past surgical history that includes urological.    Current Medications:   Current Outpatient Prescriptions on File Prior to Encounter   Medication Sig Dispense Refill    latanoprost (XALATAN) 0.005 % ophthalmic solution USE 1 DROP IN BOTH EYES EVERY EVENING  4    denosumab (PROLIA) 60 mg/mL injection 60 mg by SubCUTAneous route. Last dose 9/19/2016      bisoprolol (ZEBETA) 5 mg tablet Take 0.5-1 Tabs by mouth daily. Indications: HYPERTENSION 90 Tab 3    metoclopramide HCl (REGLAN) 10 mg tablet Take 1 Tab by mouth daily. Takes once a day at bedtime 90 Tab 3    rosuvastatin (CRESTOR) 40 mg tablet Take 0.5-1 Tabs by mouth nightly. Indications: HYPERLIPIDEMIA 90 Tab 3    omega 3-dha-epa-fish oil (FISH OIL) 900-1,400 mg cpDR Take 2 Caps by mouth daily for 90 days.  Last dose 01/14/10   Indications: ARTERIOSCLEROTIC VASCULAR DISEASE PREVENTION 180 Cap 3    calcium citrate-vitamin D3 (CITRACAL + D) tablet One am(with centrum) and two pm   Indications: OSTEOPOROSIS 90 Tab 3    esomeprazole (NEXIUM) 20 mg capsule Take  by mouth daily. OTC, in the AM and before dinner to lower stomach acid.  sildenafil citrate (VIAGRA) 100 mg tablet One tablet every day for ED      magnesium oxide (MAG-OX) 400 mg tablet One tablet daily to prevent or treat low magnesium      timolol (TIMOPTIC) 0.25 % ophthalmic solution Administer 1 Drop to both eyes daily.  ranitidine (ZANTAC) 300 mg tablet Take 300 mg by mouth daily. Takes at bedtime       LATANOPROST (XALATAN OP) Apply 1 Drop to eye daily. One drop to each eye       aspirin 81 mg Tab Take 81 mg by mouth daily. Last dose 01/14/10       multivitamin (ONE A DAY) tablet Take 1 Tab by mouth daily. Centrum silver last dose 01/14/10        No current facility-administered medications on file prior to encounter. Date Last Reviewed: 1/11/2017    Social History/Home Situation: Patient lives in a private residence with his wife who can help him out as needed. He reports being modified independent at home with increased time needed for ADLs. Work/Activity History: Part time . OBJECTIVE:    Outcome Measure: Tool Used: Disabilities of the Arm, Shoulder and Hand (DASH) Questionnaire - Quick Version  Score:  Initial: 22/55  Most Recent: 23/55 (Date: (11/23/16 )                          31/55 (Date: 12/22/16)                          27/55 (Date: 12/29/16)   Interpretation of Score: The DASH is designed to measure the activities of daily living in person's with upper extremity dysfunction or pain. Each section is scored on a 1-5 scale, 5 representing the greatest disability. The scores of each section are added together for a total score of 55.     Score 11 12-19 20-28 29-37 38-45 46-54 55   Modifier CH CI CJ CK CL CM CN ? Carrying, Moving, and Handling Objects:     - CURRENT STATUS: CJ - 20%-39% impaired, limited or restricted    - GOAL STATUS:  CI - 1%-19% impaired, limited or restricted    - D/C STATUS:  ---------------To be determined---------------      Observation/Postural and Gait Assessment: In standing: patient with forward head, rounded shoulders, L shoulder elevated with increase tension in L upper trapezius, L scapula depressed and protraction. Slumped posture in sitting- able to correct with cuing, but difficulty holding posture due to decreased endurance of postural muscles. Palpation:  Moderate tenderness with moderate tightness noted in L upper trapezius. Moderate tenderness to palpation of L teres minor and pectoralis muscles. Moderate tightness and tenderness in R upper trapezius and levator scapula. Moderate to significant tenderness R teres minor. ROM:   PROM Left (degrees) Right (degrees)   Shoulder Flexion 140 140   Shoulder Abduction 130 130   Shoulder Internal Rotation  80 85   Functional Internal Rotation L1 L5   Shoulder External Rotation 57 50   Functional External Rotation C7 (from C5) T2     Strength: Motion Tested Left   (*/5) Right  (*/5)   Shoulder Flexion 4 (from 4-) 4- (from 4)   Scaption 4 (from 4-) 4- (from 4)   Shoulder Abduction 5 (from 4+) 4-   Shoulder Internal Rotation  4+ 4   Shoulder External Rotation 2+ 3- (from 4-)   Elbow Flexion 5 5   Elbow Extension 5 5    (in lbs): arm at side with elbow flexed to 90 degrees 70 65     Special Tests:  Not formally assessed due to MRI results with massive rotator cuff tear and increased L shoulder pain with certain positions. Passive Accessory Motion Testing: Significant tightness with inferior and posterior glenohumeral joint mobilizations. Neurological Screen:  Myotomes: Key muscle strength testing for bilateral UE is WNL.   Dermatomes: Sensation testing through bilateral UE for light touch is intact from C3 to T2.  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 Balance: Sitting and standing balance grossly intact. TREATMENT:    (In addition to Assessment/Re-Assessment sessions the following treatments were rendered)  Therapeutic Exercise: (25 Minutes): Exercises per grid below to improve mobility, strength and coordination. Required minimal verbal cues to promote proper body alignment, promote proper body posture and promote proper body mechanics. Progressed resistance, range, repetitions and complexity of movement as indicated. Date:  1/9/17 Date:  1/11/17 Date:  1/6/17   Activity/Exercise Parameters Parameters Parameters   Scapular retractions X 20 --- X 20    Wall slides X 15 reps  No lift, 2 x 10 Wall slides, no lift, x 15   UBE Level 4, 4 minutes forward, 4 minutes backward Level 4, 3 minutes forward, 3 minutes backwards Level 4, 4 minutes forward, 4 minutes backwards   Finger ladder X 5 reps each shoulder x 10 sec hold at top ---  ---    Wall push ups 2 x 15 X 20 X 30    Band rows Blue  x 30 reps  Blue band and red, 2 x 10 Blue and red band, 2 x 10   Band low row Blue  x 30 reps  Green band, 2 x 10  Blue band, 3 x 10   Shoulder flexion Seated in chair both hands on blue band x 10 reps 5 sec hold  Yellow band x 10 on L, tan band x 10 on R Ball roll on wall, bilateral, 15 x 5 seconds   Shoulder scaption Active 2x10 reps shoulder height  --- ---   Bicep curl Cable cord 7.5 lb. --- Cable cord, 7.5 lbs, each arm to fatigue   Tricep extension Cable bar 10 lb.  2x15  --- Cable cord, 7.5 lbs, each arm to fatigue   Pulley ----- --- X 5 minutes   Band internal rotation Green , 2 x 10 e 20 reps  --- ---   Band external rotation Bilateral, hands close, gentle contraction, 2 x 10 Bilateral hands close, gentle contraction 2x10 reps  Bilateral, hands close, gentle contraction, red, x 20   Wand external rotation bileteral 20 reps 5 sec hold each Bilateral, 10 x 5 seconds each Bilateral, back against wall, 15 x 5 seconds each         Manual Therapy (      20 minutes): Patient in supine: gentle oscillations and grade I-II glenohumeral joint mobilizations to decrease pain and improved relaxation; gentle glenohumeral distraction. Therapeutic Modalities: for pain/ edema:   Right Shoulder Heat  Type: Moist pack  Duration : 10 minutes  Patient Position: Sitting     Left Shoulder Heat  Type: Moist pack  Duration : 10 minutes  Patient Position: Sitting        Right Shoulder Cold  Type:  (vasopneumatic compression)  Duration : 15 minutes  Patient Position: Sitting     Left Shoulder Cold  Type: Cold/ice pack  Duration : 15 minutes  Patient Position: Sitting                                                                      HEP: As above; handouts given to patient for all exercises. ______________________________________________________________________________________________________    Treatment Assessment: Patient with good tolerance for exercises. Some increased pain with activities, but reported decreased pain at end of treatment. Good response to gentle oscillations and grade I-II glenohumeral joint mobilizations on R to decrease pain. Pain rated 3/10 on L and 5/10 on R at end of session. Progression/Medical Necessity:   · Patient is expected to demonstrate progress in strength, range of motion, coordination and functional technique to increase independence with ADLs  and improve safety during overhead reach, ADLs, and recreational activities. · Skilled intervention continues to be required due to increased L shoulder pain with overhead reach, ADLs, and recreational activities.   Compliance with Program/Exercises: Compliant most of the time. Reason for Continuation of Services/Other Comments:  · Patient continues to require skilled intervention due to increased L shoulder pain with decreased ROM hindering activity tolerance, functional mobility, and overall quality of life. Recommendations/Intent for next treatment session: \"Treatment next visit will focus on advancements to more challenging activities\". Continue manual therapy for R shoulder for pain control as tolerated. Progress exercises, postural, and functional mobility as able.     Re-certification 06/01/13: continue physical therapy 2 times a week for 4 weeks for bilateral shoulders    Total Treatment Duration: 70 minutes   PT Patient Time In/Time Out  Time In: 3470  Time Out: Warren Kasper PT

## 2017-01-16 ENCOUNTER — HOSPITAL ENCOUNTER (OUTPATIENT)
Dept: PHYSICAL THERAPY | Age: 72
Discharge: HOME | End: 2017-01-16
Attending: ORTHOPAEDIC SURGERY
Payer: MEDICARE

## 2017-01-16 PROCEDURE — 97140 MANUAL THERAPY 1/> REGIONS: CPT

## 2017-01-16 PROCEDURE — 97110 THERAPEUTIC EXERCISES: CPT

## 2017-01-16 NOTE — PROGRESS NOTES
Merary Davies   (:1945) [id number] at  [address], [address]  [phone]   [phone]         Outpatient PHYSICAL THERAPY: Daily Note  Fall Risk Score: 2 (? 5 = High Risk)    ICD-10: Treatment Diagnosis: complete rotator cuff tear or rupture of left shoulder, not specified as traumatic (M75.122)  ICD-10: Treatment Diagnosis 2: pain in left shoulder (M25.512)  ICD-10: Treatment Diagnosis 3: complete rotator cuff tear or rupture of right shoulder, not specified as traumatic (M75.121)  ICD-10: Treatment Diagnosis 4: pain in right shoulder (M25.511)  DATE: 2017  REFERRING PHYSICIAN: Kristin Webster MD MD Orders: Evaluate and treat  Return Physician Appointment: 2017  MEDICAL/REFERRING DIAGNOSIS: Complete rotator cuff tear or rupture of left shoulder, not specified as traumatic [M75.122]  DATE OF ONSET: 2016   PRIOR LEVEL OF FUNCTION: independent  PRECAUTIONS: full massive rotator cuff tear- supraspinatus/ infraspinatus  ALLERGIES: No Known Allergies Patient denies allergy to latex, adhesives or creams. ASSESSMENT:  ?????? ? ? This section established at most recent assessment?????????? Patient is a 80year old male presenting to physical therapy with complaints of L shoulder pain and decreased functional mobility. Patient had MRI performed on 16 which indicated massive full thickness tear of supraspinatus and infraspinatus with 5 cm retraction, moderate degeneration and hypertrophy of AC joint and high riding humeral head- per chart review. Patient reports no incident of trauma or injury. He received an injection in his L shoulder in the end of September which he reports helping with pain and ROM. Patient states the injection is wearing off now and he has some increased pain.  He has at times had pain 10/10 with quick overhead motions, but currently reports his pain being controlled because he knows what to do or not do with his L UE. Now, patient rates pain 0/10 at rest up to 4/10 with overhead reach. Patient presents with increased pain, decreased strength, decreased ROM, decreased flexibility, impaired posture, impaired overhead reach, decreased activity tolerance, and overall impaired functional mobility. Patient is a good candidate for skilled physical therapy interventions to include manual therapy, therapeutic exercise, postural re-education, body mechanics training, and pain modalities as needed. Patient has been seen for 17 sessions of physical therapy from 10/28/16 to 12/29/16. He reports his L shoulder feeling much better since starting therapy with improvements in mobility, activity tolerance, and pain. We recently added his R shoulder into treatments and he reports minimal change thus far. Patient is motivated and using bilateral UEs as able at home and reports no interest in surgery at this time. Patient with improvements in strength, ROM, overall mobility, and activity tolerance. He has met many goals since starting therapy and is progressing with the rest. Patient should benefit from continued skilled therapy to address remaining strength and ROM deficits for bilateral shoulders. PROBLEM LIST (Impairments causing functional limitations):  1. Decreased Strength affecting function  2. Decreased ADL/Functional Activities  3. Decreased Flexibility/joint mobility  4. Decreased transfer abilities  5. Increased Pain affecting function  6. Decreased Activity tolerance   7. Decreased Pacing skills  8. Decreased Work Simplification/Energy Conservation techniques  GOALS: (Goals have been discussed and agreed upon with patient.)  SHORT-TERM FUNCTIONAL GOALS: Time Frame: 10/28/16 to 11/11/16   1. Patient will be independent with HEP to improve upright posture, overhead reach, and L UE ROM. -GOAL MET  2.  Patient will report no more than 0/10 L shoulder pain at rest in order to demonstrate improved self pain control and tolerance. -GOAL MET   3. Patient will improve upright posture including decreased forward head and rounded shoulders with improved L shoulder position in order to improve overhead reach. -GOAL MET  DISCHARGE GOALS: Time Frame: 12/29/16 to 1/27/17  1. Patient will improve gross overhead active ROM to 150 degrees in order to improve functional mobility and independence with ADLs. -ONGOING  2. Patient will report no more than 4/10 L shoulder pain with overhead motions in order to demonstrate improved activity tolerance. -GOAL MET   3. Patient will report minimal to no pain with driving in order to demonstrate improve functional mobility. -ONGOING (L improved to minimal pain, R with significant pain- 12/29/16)  4. 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 -ONGOING (reports moderate pain 12/29/16)  5. Patient will improve Disability of the Arm, Shoulder, and Hand score to 18/55 from 22/55. -ONGOING (scord 27/55 on 12/29/16)  6. NEW GOAL 12/29/16: Patient will report no more than 7/10 R shoulder pain with overhead motions in order to demonstrate improved activity tolerance. 7. NEW GOAL 12/29/16: Patient will report overall decreased R shoulder pain with improve mobility in order to return to prior functional level. REHABILITATION POTENTIAL FOR STATED GOALS: [place] OF CARE:  INTERVENTIONS PLANNED: (Benefits and precautions of physical therapy have been discussed with the patient.)  1. cold  2. heat  3. home exercise program (HEP)  4. manual therapy  5. neuromuscular re-education/strengthening  6. range of motion: active/assisted/passive  7. therapeutic activities  8. therapeutic exercise/strengthening  9. ultrasound  TREATMENT PLAN EFFECTIVE DATES: 12/29/16 to 1/27/17  FREQUENCY/DURATION: Follow patient 2 times a week for 4 weeks to address above goals.    Regarding Marva Lorenz's therapy, I certify that the treatment plan above will be carried out by a therapist or under their direction. Thank you for this referral,  Suzie Lucero PTA     Referring Physician Signature: Kristin Webster MD          Date                       SUBJECTIVE:  Pt. Stated not much improvement with shoulders today. New Script from Dr. Arianna Paez: add in Right shoulder physical therapy 2 times a week for 4 weeks (12/21/16)    History of Present Injury/Illness (Reason for Referral): Patient is a 80year old male presenting to physical therapy with complaints of L shoulder pain and decreased functional mobility. Patient had MRI performed on 9/2/16 which indicated massive full thickness tear of supraspinatus and infraspinatus with 5 cm retraction, moderate degeneration and hypertrophy of AC joint and high riding humeral head- per chart review. Patient reports no incident of trauma or injury. He received an injection in his L shoulder in the end of September which he reports helping with pain and ROM. Patient states the injection is wearing off now and he has some increased pain. He has at times had pain 10/10 with quick overhead motions, but currently reports his pain being controlled because he knows what to do or not do with his L UE. Now, patient rates pain 0/10 at rest up to 4/10 with overhead reach. Patient presents with increased pain, decreased strength, decreased ROM, decreased flexibility, impaired posture, impaired overhead reach, decreased activity tolerance, and overall impaired functional mobility. Present Symptoms: aching, burning, tingling   Pain Intensity 1: 4  Pain Location 1: Shoulder  Pain Orientation 1: Left  Pain Intensity 2: 6  Pain Location 2: Shoulder  Pain Orientation 2: Right  Dominant Side: left  Past Medical History: Mr. Martinez Mane  has a past medical history of Acute maxillary sinusitis; Acute pharyngitis; Aftercare for long-term (current) use of antiplatelets/antithrombotics (9/29/2016); Allergic rhinitis; Anemia (9/29/2016);  Backache; CAD (coronary artery disease) (9/29/2016); Carotid artery disease ([facility] (9/29/2016); Decreased libido; Diabetes ([facility] (9/29/2016); Dizziness and giddiness; ED (erectile dysfunction) (9/29/2016); Encounter for monitoring testosterone replacement therapy (9/29/2016); Fatigue; Fever blister; GERD (gastroesophageal reflux disease); Glaucoma (9/29/2016); Hearing difficulty of right ear (9/29/2016); High cholesterol; High risk medication use (9/29/2016); Hypertension (9/29/2016); Hyponatremia (9/29/2016); IBS (irritable bowel syndrome) (9/29/2016); Intertrigo (9/29/2016); Irregular heart beat (9/29/2016); Left shoulder pain; Mouth pain; Obstructive sleep apnea of adult (9/29/2016); Osteoporosis (9/29/2016); Palpitations; Plantar wart of left foot (9/29/2016); Rotator cuff tear; Sinus bradycardia (9/29/2016); Sinusitis; Skin infection; Sore throat; Special screening for malignant neoplasm of prostate; Subclinical hyperthyroidism; and Tinea corporis. He also  has a past surgical history that includes urological.    Current Medications:   Current Outpatient Prescriptions on File Prior to Encounter   Medication Sig Dispense Refill    latanoprost (XALATAN) 0.005 % ophthalmic solution USE 1 DROP IN BOTH EYES EVERY EVENING  4    denosumab (PROLIA) 60 mg/mL injection 60 mg by SubCUTAneous route. Last dose 9/19/2016      bisoprolol (ZEBETA) 5 mg tablet Take 0.5-1 Tabs by mouth daily. Indications: HYPERTENSION 90 Tab 3    metoclopramide HCl (REGLAN) 10 mg tablet Take 1 Tab by mouth daily. Takes once a day at bedtime 90 Tab 3    rosuvastatin (CRESTOR) 40 mg tablet Take 0.5-1 Tabs by mouth nightly. Indications: HYPERLIPIDEMIA 90 Tab 3    omega 3-dha-epa-fish oil (FISH OIL) 900-1,400 mg cpDR Take 2 Caps by mouth daily for 90 days.  Last dose 01/14/10   Indications: ARTERIOSCLEROTIC VASCULAR DISEASE PREVENTION 180 Cap 3    calcium citrate-vitamin D3 (CITRACAL + D) tablet One am(with centrum) and two pm   Indications: OSTEOPOROSIS 90 Tab 3    esomeprazole (NEXIUM) 20 mg capsule Take  by mouth daily. OTC, in the AM and before dinner to lower stomach acid.  sildenafil citrate (VIAGRA) 100 mg tablet One tablet every day for ED      magnesium oxide (MAG-OX) 400 mg tablet One tablet daily to prevent or treat low magnesium      timolol (TIMOPTIC) 0.25 % ophthalmic solution Administer 1 Drop to both eyes daily.  ranitidine (ZANTAC) 300 mg tablet Take 300 mg by mouth daily. Takes at bedtime       LATANOPROST (XALATAN OP) Apply 1 Drop to eye daily. One drop to each eye       aspirin 81 mg Tab Take 81 mg by mouth daily. Last dose 01/14/10       multivitamin (ONE A DAY) tablet Take 1 Tab by mouth daily. Centrum silver last dose 01/14/10        No current facility-administered medications on file prior to encounter. Date Last Reviewed: 1/16/2017    Social History/Home Situation: Patient lives in a private residence with his wife who can help him out as needed. He reports being modified independent at home with increased time needed for ADLs. Work/Activity History: Part time . OBJECTIVE:    Outcome Measure: Tool Used: Disabilities of the Arm, Shoulder and Hand (DASH) Questionnaire - Quick Version  Score:  Initial: 22/55  Most Recent: 23/55 (Date: (11/23/16 )                          31/55 (Date: 12/22/16)                          27/55 (Date: 12/29/16)   Interpretation of Score: The DASH is designed to measure the activities of daily living in person's with upper extremity dysfunction or pain. Each section is scored on a 1-5 scale, 5 representing the greatest disability. The scores of each section are added together for a total score of 55. Score 11 12-19 20-28 29-37 38-45 46-54 55   Modifier CH CI CJ CK CL CM CN     ?  Carrying, Moving, and Handling Objects:     - CURRENT STATUS: CJ - 20%-39% impaired, limited or restricted    - GOAL STATUS:  CI - 1%-19% impaired, limited or restricted    - D/C STATUS:  ---------------To be determined---------------      Observation/Postural and Gait Assessment: In standing: patient with forward head, rounded shoulders, L shoulder elevated with increase tension in L upper trapezius, L scapula depressed and protraction. Slumped posture in sitting- able to correct with cuing, but difficulty holding posture due to decreased endurance of postural muscles. Palpation:  Moderate tenderness with moderate tightness noted in L upper trapezius. Moderate tenderness to palpation of L teres minor and pectoralis muscles. Moderate tightness and tenderness in R upper trapezius and levator scapula. Moderate to significant tenderness R teres minor. ROM:   PROM Left (degrees) Right (degrees)   Shoulder Flexion 140 140   Shoulder Abduction 130 130   Shoulder Internal Rotation  80 85   Functional Internal Rotation L1 L5   Shoulder External Rotation 57 50   Functional External Rotation C7 (from C5) T2     Strength: Motion Tested Left   (*/5) Right  (*/5)   Shoulder Flexion 4 (from 4-) 4- (from 4)   Scaption 4 (from 4-) 4- (from 4)   Shoulder Abduction 5 (from 4+) 4-   Shoulder Internal Rotation  4+ 4   Shoulder External Rotation 2+ 3- (from 4-)   Elbow Flexion 5 5   Elbow Extension 5 5    (in lbs): arm at side with elbow flexed to 90 degrees 70 65     Special Tests:  Not formally assessed due to MRI results with massive rotator cuff tear and increased L shoulder pain with certain positions. Passive Accessory Motion Testing: Significant tightness with inferior and posterior glenohumeral joint mobilizations. Neurological Screen:  Myotomes: Key muscle strength testing for bilateral UE is WNL. Dermatomes: Sensation testing through bilateral UE for light touch is intact from C3 to T2.   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 Balance: Sitting and standing balance grossly intact. TREATMENT:    (In addition to Assessment/Re-Assessment sessions the following treatments were rendered)  Therapeutic Exercise: ( ): x 45 mins Exercises per grid below to improve mobility, strength and coordination. Required minimal verbal cues to promote proper body alignment, promote proper body posture and promote proper body mechanics. Progressed resistance, range, repetitions and complexity of movement as indicated. Date:  1/9/17 Date:  1/11/17 Date:  1/16/17   Activity/Exercise Parameters Parameters Parameters   Scapular retractions X 20 --- X 20    Wall slides X 15 reps  No lift, 2 x 10 Wall slides, no lift, x 15   UBE Level 4, 4 minutes forward, 4 minutes backward Level 4, 3 minutes forward, 3 minutes backwards Level 5,  4 minutes forward, 4 minutes backwards   Finger ladder X 5 reps each shoulder x 10 sec hold at top ---  X 10 reps flexion & abduction    Wall push ups 2 x 15 X 20 X 30    Band rows Blue  x 30 reps  Blue band and red, 2 x 10 Blue band, 3 x 10   Band low row Blue  x 30 reps  Green band, 2 x 10  Blue band, 3 x 10   Shoulder flexion Seated in chair both hands on blue band x 10 reps 5 sec hold  Yellow band x 10 on L, tan band x 10 on R Yellow band x 10 reps on left tan band x 10 on right   Shoulder scaption Active 2x10 reps shoulder height  --- Active 3 x 10 shoulder height   Bicep curl Cable cord 7.5 lb. --- Cable cord, 7.5 lbs, each arm to fatigue   Tricep extension Cable bar 10 lb.  2x15  --- Cable cord, 7.5 lbs, each arm to fatigue   Pulley ----- --- X 5 minutes   Band internal rotation Green , 2 x 10 e 20 reps  --- Green band 2x10 reps    Band external rotation Bilateral, hands close, gentle contraction, 2 x 10 Bilateral hands close, gentle contraction 2x10 reps  Bilateral, hands close, gentle contraction, red, x 20   Wand external rotation bileteral 20 reps 5 sec hold each Bilateral, 10 x 5 seconds each Bilateral, back against wall, 15 x 5 seconds each         Manual Therapy (      10 minutes): Patient received PROM in all planes to tolerance to bilateral shoulders. Gentle distraction and oscillation to decrease pain and increase range of motion. Therapeutic Modalities: for pain/ edema: x 10 mins Pt. Received ice pack to bilateral shoulders in supine. HEP: As above; handouts given to patient for all exercises. ______________________________________________________________________________________________________    Treatment Assessment: Patient stated his pain was the same on the right shoulder and improved pain on the left to 1/10. Progression/Medical Necessity:   · Patient is expected to demonstrate progress in strength, range of motion, coordination and functional technique to increase independence with ADLs  and improve safety during overhead reach, ADLs, and recreational activities. · Skilled intervention continues to be required due to increased L shoulder pain with overhead reach, ADLs, and recreational activities. Compliance with Program/Exercises: Compliant most of the time. Reason for Continuation of Services/Other Comments:  · Patient continues to require skilled intervention due to increased L shoulder pain with decreased ROM hindering activity tolerance, functional mobility, and overall quality of life. Recommendations/Intent for next treatment session: \"Treatment next visit will focus on advancements to more challenging activities\". Continue manual therapy for R shoulder for pain control as tolerated. Progress exercises, postural, and functional mobility as able.     Re-certification 36/85/84: continue physical therapy 2 times a week for 4 weeks for bilateral shoulders    Total Treatment Duration: 65  minutes   PT Patient Time In/Time Out  Time In: 0900  Time Out: [address], PTA

## 2017-01-18 ENCOUNTER — HOSPITAL ENCOUNTER (OUTPATIENT)
Dept: PHYSICAL THERAPY | Age: 72
Discharge: HOME | End: 2017-01-18
Attending: ORTHOPAEDIC SURGERY
Payer: MEDICARE

## 2017-01-18 PROCEDURE — 97016 VASOPNEUMATIC DEVICE THERAPY: CPT

## 2017-01-18 PROCEDURE — 97140 MANUAL THERAPY 1/> REGIONS: CPT

## 2017-01-18 PROCEDURE — 97110 THERAPEUTIC EXERCISES: CPT

## 2017-01-18 NOTE — PROGRESS NOTES
Samuel Pereira   [phone] at  [facility]  [address] [place] [address], [place], [address] [place] [address]  [phone]   [phone]         Outpatient PHYSICAL THERAPY: Daily Note  Fall Risk Score: 2 (? 5 = High Risk)    ICD-10: Treatment Diagnosis: complete rotator cuff tear or rupture of left shoulder, not specified as traumatic (M75.122)  ICD-10: Treatment Diagnosis 2: pain in left shoulder (M25.512)  ICD-10: Treatment Diagnosis 3: complete rotator cuff tear or rupture of right shoulder, not specified as traumatic (M75.121)  ICD-10: Treatment Diagnosis 4: pain in right shoulder (M25.511)  DATE: 2017  REFERRING PHYSICIAN: Rajinder Ferrell MD MD Orders: Evaluate and treat  Return Physician Appointment: 2017  MEDICAL/REFERRING DIAGNOSIS: Complete rotator cuff tear or rupture of left shoulder, not specified as traumatic [M75.122]  DATE OF ONSET: 2016   PRIOR LEVEL OF FUNCTION: independent  PRECAUTIONS: full massive rotator cuff tear- supraspinatus/ infraspinatus  ALLERGIES: No Known Allergies Patient denies allergy to latex, adhesives or creams. ASSESSMENT:  ?????? ? ? This section established at most recent assessment?????????? Patient is a 80year old male presenting to physical therapy with complaints of L shoulder pain and decreased functional mobility. Patient had MRI performed on 16 which indicated massive full thickness tear of supraspinatus and infraspinatus with 5 cm retraction, moderate degeneration and hypertrophy of AC joint and high riding humeral head- per chart review. Patient reports no incident of trauma or injury. He received an injection in his L shoulder in the end of September which he reports helping with pain and ROM. Patient states the injection is wearing off now and he has some increased pain.  He has at times had pain 10/10 with quick overhead motions, but currently reports his pain being controlled because he knows what to do or not do with his L UE. Now, patient rates pain 0/10 at rest up to 4/10 with overhead reach. Patient presents with increased pain, decreased strength, decreased ROM, decreased flexibility, impaired posture, impaired overhead reach, decreased activity tolerance, and overall impaired functional mobility. Patient is a good candidate for skilled physical therapy interventions to include manual therapy, therapeutic exercise, postural re-education, body mechanics training, and pain modalities as needed. Patient has been seen for 17 sessions of physical therapy from 10/28/16 to 12/29/16. He reports his L shoulder feeling much better since starting therapy with improvements in mobility, activity tolerance, and pain. We recently added his R shoulder into treatments and he reports minimal change thus far. Patient is motivated and using bilateral UEs as able at home and reports no interest in surgery at this time. Patient with improvements in strength, ROM, overall mobility, and activity tolerance. He has met many goals since starting therapy and is progressing with the rest. Patient should benefit from continued skilled therapy to address remaining strength and ROM deficits for bilateral shoulders. PROBLEM LIST (Impairments causing functional limitations):  1. Decreased Strength affecting function  2. Decreased ADL/Functional Activities  3. Decreased Flexibility/joint mobility  4. Decreased transfer abilities  5. Increased Pain affecting function  6. Decreased Activity tolerance   7. Decreased Pacing skills  8. Decreased Work Simplification/Energy Conservation techniques  GOALS: (Goals have been discussed and agreed upon with patient.)  SHORT-TERM FUNCTIONAL GOALS: Time Frame: 10/28/16 to 11/11/16   1. Patient will be independent with HEP to improve upright posture, overhead reach, and L UE ROM. -GOAL MET  2.  Patient will report no more than 0/10 L shoulder pain at rest in order to demonstrate improved self pain control and tolerance. -GOAL MET   3. Patient will improve upright posture including decreased forward head and rounded shoulders with improved L shoulder position in order to improve overhead reach. -GOAL MET  DISCHARGE GOALS: Time Frame: 12/29/16 to 1/27/17  1. Patient will improve gross overhead active ROM to 150 degrees in order to improve functional mobility and independence with ADLs. -ONGOING  2. Patient will report no more than 4/10 L shoulder pain with overhead motions in order to demonstrate improved activity tolerance. -GOAL MET   3. Patient will report minimal to no pain with driving in order to demonstrate improve functional mobility. -ONGOING (L improved to minimal pain, R with significant pain- 12/29/16)  4. 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 -ONGOING (reports moderate pain 12/29/16)  5. Patient will improve Disability of the Arm, Shoulder, and Hand score to 18/55 from 22/55. -ONGOING (scord 27/55 on 12/29/16)  6. NEW GOAL 12/29/16: Patient will report no more than 7/10 R shoulder pain with overhead motions in order to demonstrate improved activity tolerance. 7. NEW GOAL 12/29/16: Patient will report overall decreased R shoulder pain with improve mobility in order to return to prior functional level. REHABILITATION POTENTIAL FOR STATED GOALS: Riaz Montes OF CARE:  INTERVENTIONS PLANNED: (Benefits and precautions of physical therapy have been discussed with the patient.)  1. cold  2. heat  3. home exercise program (HEP)  4. manual therapy  5. neuromuscular re-education/strengthening  6. range of motion: active/assisted/passive  7. therapeutic activities  8. therapeutic exercise/strengthening  9. ultrasound  TREATMENT PLAN EFFECTIVE DATES: 12/29/16 to 1/27/17  FREQUENCY/DURATION: Follow patient 2 times a week for 4 weeks to address above goals.    Regarding Boogie Lorenz's therapy, I certify that the treatment plan above will be carried out by a therapist or under their direction. Thank you for this referral,  Prateek Rudolph PT     Referring Physician Signature: Karla Sr MD          Date                       SUBJECTIVE:  \"My left shoulder has been doing better. It used to be the bad one and now its the good one. I just keep waiting for the right shoulder to get some better. The pain was a little better for about an hour when you worked on it the last time, but it just doesn't last.\"    New Script from Dr. Priscilla Dasilva: add in Right shoulder physical therapy 2 times a week for 4 weeks (12/21/16)    History of Present Injury/Illness (Reason for Referral): Patient is a 80year old male presenting to physical therapy with complaints of L shoulder pain and decreased functional mobility. Patient had MRI performed on 9/2/16 which indicated massive full thickness tear of supraspinatus and infraspinatus with 5 cm retraction, moderate degeneration and hypertrophy of AC joint and high riding humeral head- per chart review. Patient reports no incident of trauma or injury. He received an injection in his L shoulder in the end of September which he reports helping with pain and ROM. Patient states the injection is wearing off now and he has some increased pain. He has at times had pain 10/10 with quick overhead motions, but currently reports his pain being controlled because he knows what to do or not do with his L UE. Now, patient rates pain 0/10 at rest up to 4/10 with overhead reach. Patient presents with increased pain, decreased strength, decreased ROM, decreased flexibility, impaired posture, impaired overhead reach, decreased activity tolerance, and overall impaired functional mobility.   Present Symptoms: aching, burning, tingling   Pain Intensity 1: 4  Pain Location 1: Shoulder  Pain Orientation 1: Left  Pain Intensity 2: 7  Pain Location 2: Shoulder  Pain Orientation 2: Right  Dominant Side: left  Past Medical History:  hSaunna Lewiserik has a past medical history of Acute maxillary sinusitis; Acute pharyngitis; Aftercare for long-term (current) use of antiplatelets/antithrombotics (9/29/2016); Allergic rhinitis; Anemia (9/29/2016); Backache; CAD (coronary artery disease) (9/29/2016); Carotid artery disease ([facility] (9/29/2016); Decreased libido; [facility] (9/29/2016); Dizziness and giddiness; ED (erectile dysfunction) (9/29/2016); Encounter for monitoring testosterone replacement therapy (9/29/2016); Fatigue; Fever blister; GERD (gastroesophageal reflux disease); Glaucoma (9/29/2016); Hearing difficulty of right ear (9/29/2016); High cholesterol; High risk medication use (9/29/2016); Hypertension (9/29/2016); Hyponatremia (9/29/2016); IBS (irritable bowel syndrome) (9/29/2016); Intertrigo (9/29/2016); Irregular heart beat (9/29/2016); Left shoulder pain; Mouth pain; Obstructive sleep apnea of adult (9/29/2016); Osteoporosis (9/29/2016); Palpitations; Plantar wart of left foot (9/29/2016); Rotator cuff tear; Sinus bradycardia (9/29/2016); Sinusitis; Skin infection; Sore throat; Special screening for malignant neoplasm of prostate; Subclinical hyperthyroidism; and Tinea corporis. He also  has a past surgical history that includes urological.    Current Medications:   Current Outpatient Prescriptions on File Prior to Encounter   Medication Sig Dispense Refill    latanoprost (XALATAN) 0.005 % ophthalmic solution USE 1 DROP IN BOTH EYES EVERY EVENING  4    denosumab (PROLIA) 60 mg/mL injection 60 mg by SubCUTAneous route. Last dose 9/19/2016      bisoprolol (ZEBETA) 5 mg tablet Take 0.5-1 Tabs by mouth daily. Indications: HYPERTENSION 90 Tab 3    metoclopramide HCl (REGLAN) 10 mg tablet Take 1 Tab by mouth daily. Takes once a day at bedtime 90 Tab 3    rosuvastatin (CRESTOR) 40 mg tablet Take 0.5-1 Tabs by mouth nightly.  Indications: HYPERLIPIDEMIA 90 Tab 3    omega 3-dha-epa-fish oil (FISH OIL) 900-1,400 mg cpDR Take 2 Caps by mouth daily for 90 days. Last dose 01/14/10   Indications: ARTERIOSCLEROTIC VASCULAR DISEASE PREVENTION 180 Cap 3    calcium citrate-vitamin D3 (CITRACAL + D) tablet One am(with centrum) and two pm   Indications: OSTEOPOROSIS 90 Tab 3    esomeprazole (NEXIUM) 20 mg capsule Take  by mouth daily. OTC, in the AM and before dinner to lower stomach acid.  sildenafil citrate (VIAGRA) 100 mg tablet One tablet every day for ED      magnesium oxide (MAG-OX) 400 mg tablet One tablet daily to prevent or treat low magnesium      timolol (TIMOPTIC) 0.25 % ophthalmic solution Administer 1 Drop to both eyes daily.  ranitidine (ZANTAC) 300 mg tablet Take 300 mg by mouth daily. Takes at bedtime       LATANOPROST (XALATAN OP) Apply 1 Drop to eye daily. One drop to each eye       aspirin 81 mg Tab Take 81 mg by mouth daily. Last dose 01/14/10       multivitamin (ONE A DAY) tablet Take 1 Tab by mouth daily. Centrum silver last dose 01/14/10        No current facility-administered medications on file prior to encounter. Date Last Reviewed: 1/18/2017    Social History/Home Situation: Patient lives in a private residence with his wife who can help him out as needed. He reports being modified independent at home with increased time needed for ADLs. Work/Activity History: Part time . OBJECTIVE:    Outcome Measure: Tool Used: Disabilities of the Arm, Shoulder and Hand (DASH) Questionnaire - Quick Version  Score:  Initial: 22/55  Most Recent: 23/55 (Date: (11/23/16 )                          31/55 (Date: 12/22/16)                          27/55 (Date: 12/29/16)   Interpretation of Score: The DASH is designed to measure the activities of daily living in person's with upper extremity dysfunction or pain. Each section is scored on a 1-5 scale, 5 representing the greatest disability. The scores of each section are added together for a total score of 55.     Score 11 12-19 20-28 29-37 38-45 46-54 55   Modifier CH CI CJ CK CL CM CN     ? Carrying, Moving, and Handling Objects:     - CURRENT STATUS: CJ - 20%-39% impaired, limited or restricted    - GOAL STATUS:  CI - 1%-19% impaired, limited or restricted    - D/C STATUS:  ---------------To be determined---------------      Observation/Postural and Gait Assessment: In standing: patient with forward head, rounded shoulders, L shoulder elevated with increase tension in L upper trapezius, L scapula depressed and protraction. Slumped posture in sitting- able to correct with cuing, but difficulty holding posture due to decreased endurance of postural muscles. Palpation:  Moderate tenderness with moderate tightness noted in L upper trapezius. Moderate tenderness to palpation of L teres minor and pectoralis muscles. Moderate tightness and tenderness in R upper trapezius and levator scapula. Moderate to significant tenderness R teres minor. ROM:   PROM Left (degrees) Right (degrees)   Shoulder Flexion 140 140   Shoulder Abduction 130 130   Shoulder Internal Rotation  80 85   Functional Internal Rotation L1 L5   Shoulder External Rotation 57 50   Functional External Rotation C7 (from C5) T2     Strength: Motion Tested Left   (*/5) Right  (*/5)   Shoulder Flexion 4 (from 4-) 4- (from 4)   Scaption 4 (from 4-) 4- (from 4)   Shoulder Abduction 5 (from 4+) 4-   Shoulder Internal Rotation  4+ 4   Shoulder External Rotation 2+ 3- (from 4-)   Elbow Flexion 5 5   Elbow Extension 5 5    (in lbs): arm at side with elbow flexed to 90 degrees 70 65     Special Tests:  Not formally assessed due to MRI results with massive rotator cuff tear and increased L shoulder pain with certain positions. Passive Accessory Motion Testing: Significant tightness with inferior and posterior glenohumeral joint mobilizations. Neurological Screen:  Myotomes: Key muscle strength testing for bilateral UE is WNL.   Dermatomes: Sensation testing through bilateral UE for light touch is intact from C3 to T2. 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 Balance: Sitting and standing balance grossly intact. TREATMENT:    (In addition to Assessment/Re-Assessment sessions the following treatments were rendered)  Therapeutic Exercise: (35 Minutes): Exercises per grid below to improve mobility, strength and coordination. Required minimal verbal cues to promote proper body alignment, promote proper body posture and promote proper body mechanics. Progressed resistance, range, repetitions and complexity of movement as indicated.    Date:  1/18/17 Date:  1/11/17 Date:  1/16/17   Activity/Exercise Parameters Parameters Parameters   Scapular retractions --- --- X 20    Wall slides ---  No lift, 2 x 10 Wall slides, no lift, x 15   UBE Level 4, 4 minutes forward, 4 minutes backward Level 4, 3 minutes forward, 3 minutes backwards Level 5,  4 minutes forward, 4 minutes backwards   Finger ladder --- ---  X 10 reps flexion & abduction    Wall push ups X 30 X 20 X 30    Band rows Blue and green  x 30 Blue band and red, 2 x 10 Blue band, 3 x 10   Band low row Blue  x 30 Green band, 2 x 10  Blue band, 3 x 10   Shoulder flexion Yellow band x 10 on L, tan band x 10 on R Yellow band x 10 on L, tan band x 10 on R Yellow band x 10 reps on left tan band x 10 on right   Shoulder scaption --- --- Active 3 x 10 shoulder height   Bicep curl --- --- Cable cord, 7.5 lbs, each arm to fatigue   Tricep extension --- --- Cable cord, 7.5 lbs, each arm to fatigue   Pulley ----- --- X 5 minutes   Band internal rotation Green , 2 x 10 --- Green band 2x10 reps    Band external rotation Bilateral, hands close, gentle contraction, 2 x 10 red Bilateral hands close, gentle contraction 2x10 reps  Bilateral, hands close, gentle contraction, red, x 20   Wand external rotation --- Bilateral, 10 x 5 seconds each Bilateral, back against wall, 15 x 5 seconds each   Shoulder abduction Yellow band x 10 on L, tan band x 10 on R           Manual Therapy (      15 minutes): Patient received PROM in all planes to tolerance to bilateral shoulders. Gentle distraction and oscillation to decrease pain and increase range of motion. Therapeutic Modalities: for pain:  Right Shoulder Heat  Type: Moist pack  Duration : 10 minutes  Patient Position: Sitting     Left Shoulder Heat  Type: Moist pack  Duration : 10 minutes  Patient Position: Sitting        Right Shoulder Cold  Type:  (vasopneumatic compression)  Duration : 10 minutes  Patient Position: Sitting     Left Shoulder Cold  Type: Cold/ice pack  Duration : 10 minutes  Patient Position: Sitting                                                                      HEP: As above; handouts given to patient for all exercises. ______________________________________________________________________________________________________    Treatment Assessment: Patient with good response to manual therapy to decreased R shoulder pain. Reported L shoulder pain 4/10 at end of treatment and right shoulder 5/10. Progression/Medical Necessity:   · Patient is expected to demonstrate progress in strength, range of motion, coordination and functional technique to increase independence with ADLs  and improve safety during overhead reach, ADLs, and recreational activities. · Skilled intervention continues to be required due to increased L shoulder pain with overhead reach, ADLs, and recreational activities. Compliance with Program/Exercises: Compliant most of the time.    Reason for Continuation of Services/Other Comments:  · Patient continues to require skilled intervention due to increased L shoulder pain with decreased ROM hindering activity tolerance, functional mobility, and overall quality of life. Recommendations/Intent for next treatment session: \"Treatment next visit will focus on advancements to more challenging activities\". Continue manual therapy for R shoulder for pain control as tolerated. Progress exercises, postural, and functional mobility as able.     Re-certification 67/26/55: continue physical therapy 2 times a week for 4 weeks for bilateral shoulders    Total Treatment Duration: 70 minutes   PT Patient Time In/Time Out  Time In: 0900  Time Out: [address], PT

## 2017-01-23 ENCOUNTER — HOSPITAL ENCOUNTER (OUTPATIENT)
Dept: PHYSICAL THERAPY | Age: 72
Discharge: HOME | End: 2017-01-23
Attending: ORTHOPAEDIC SURGERY
Payer: MEDICARE

## 2017-01-23 PROCEDURE — 97140 MANUAL THERAPY 1/> REGIONS: CPT

## 2017-01-23 PROCEDURE — 97110 THERAPEUTIC EXERCISES: CPT

## 2017-01-23 NOTE — PROGRESS NOTES
Junior Stewart   [phone]) [phone]  at  [facility]  [address] [place] [address], [place], [address] [place] [address]  [phone]   [phone]         Outpatient PHYSICAL THERAPY: Daily Note  Fall Risk Score: 2 (? 5 = High Risk)    ICD-10: Treatment Diagnosis: complete rotator cuff tear or rupture of left shoulder, not specified as traumatic (M75.122)  ICD-10: Treatment Diagnosis 2: pain in left shoulder (M25.512)  ICD-10: Treatment Diagnosis 3: complete rotator cuff tear or rupture of right shoulder, not specified as traumatic (M75.121)  ICD-10: Treatment Diagnosis 4: pain in right shoulder (M25.511)  DATE: 2017  REFERRING PHYSICIAN: Elizabeth Mcduffie MD MD Orders: Evaluate and treat  Return Physician Appointment: 2017  MEDICAL/REFERRING DIAGNOSIS: Complete rotator cuff tear or rupture of left shoulder, not specified as traumatic [M75.122]  DATE OF ONSET: 2016   PRIOR LEVEL OF FUNCTION: independent  PRECAUTIONS: full massive rotator cuff tear- supraspinatus/ infraspinatus  ALLERGIES: No Known Allergies Patient denies allergy to latex, adhesives or creams. ASSESSMENT:  ?????? ? ? This section established at most recent assessment?????????? Patient is a 80year old male presenting to physical therapy with complaints of L shoulder pain and decreased functional mobility. Patient had MRI performed on 16 which indicated massive full thickness tear of supraspinatus and infraspinatus with 5 cm retraction, moderate degeneration and hypertrophy of AC joint and high riding humeral head- per chart review. Patient reports no incident of trauma or injury. He received an injection in his L shoulder in the end of September which he reports helping with pain and ROM. Patient states the injection is wearing off now and he has some increased pain.  He has at times had pain 10/10 with quick overhead motions, but currently reports his pain being controlled because he knows what to do or not do with his L UE. Now, patient rates pain 0/10 at rest up to 4/10 with overhead reach. Patient presents with increased pain, decreased strength, decreased ROM, decreased flexibility, impaired posture, impaired overhead reach, decreased activity tolerance, and overall impaired functional mobility. Patient is a good candidate for skilled physical therapy interventions to include manual therapy, therapeutic exercise, postural re-education, body mechanics training, and pain modalities as needed. Patient has been seen for 17 sessions of physical therapy from 10/28/16 to 12/29/16. He reports his L shoulder feeling much better since starting therapy with improvements in mobility, activity tolerance, and pain. We recently added his R shoulder into treatments and he reports minimal change thus far. Patient is motivated and using bilateral UEs as able at home and reports no interest in surgery at this time. Patient with improvements in strength, ROM, overall mobility, and activity tolerance. He has met many goals since starting therapy and is progressing with the rest. Patient should benefit from continued skilled therapy to address remaining strength and ROM deficits for bilateral shoulders. PROBLEM LIST (Impairments causing functional limitations):  1. Decreased Strength affecting function  2. Decreased ADL/Functional Activities  3. Decreased Flexibility/joint mobility  4. Decreased transfer abilities  5. Increased Pain affecting function  6. Decreased Activity tolerance   7. Decreased Pacing skills  8. Decreased Work Simplification/Energy Conservation techniques  GOALS: (Goals have been discussed and agreed upon with patient.)  SHORT-TERM FUNCTIONAL GOALS: Time Frame: 10/28/16 to 11/11/16   1. Patient will be independent with HEP to improve upright posture, overhead reach, and L UE ROM. -GOAL MET  2.  Patient will report no more than 0/10 L shoulder pain at rest in order to demonstrate improved self pain control and tolerance. -GOAL MET   3. Patient will improve upright posture including decreased forward head and rounded shoulders with improved L shoulder position in order to improve overhead reach. -GOAL MET  DISCHARGE GOALS: Time Frame: 12/29/16 to 1/27/17  1. Patient will improve gross overhead active ROM to 150 degrees in order to improve functional mobility and independence with ADLs. -ONGOING  2. Patient will report no more than 4/10 L shoulder pain with overhead motions in order to demonstrate improved activity tolerance. -GOAL MET   3. Patient will report minimal to no pain with driving in order to demonstrate improve functional mobility. -ONGOING (L improved to minimal pain, R with significant pain- 12/29/16)  4. 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 -ONGOING (reports moderate pain 12/29/16)  5. Patient will improve Disability of the Arm, Shoulder, and Hand score to 18/55 from 22/55. -ONGOING (scord 27/55 on 12/29/16)  6. NEW GOAL 12/29/16: Patient will report no more than 7/10 R shoulder pain with overhead motions in order to demonstrate improved activity tolerance. 7. NEW GOAL 12/29/16: Patient will report overall decreased R shoulder pain with improve mobility in order to return to prior functional level. REHABILITATION POTENTIAL FOR STATED GOALS: Lainey Layer OF CARE:  INTERVENTIONS PLANNED: (Benefits and precautions of physical therapy have been discussed with the patient.)  1. cold  2. heat  3. home exercise program (HEP)  4. manual therapy  5. neuromuscular re-education/strengthening  6. range of motion: active/assisted/passive  7. therapeutic activities  8. therapeutic exercise/strengthening  9. ultrasound  TREATMENT PLAN EFFECTIVE DATES: 12/29/16 to 1/27/17  FREQUENCY/DURATION: Follow patient 2 times a week for 4 weeks to address above goals.    Regarding Brianna Lorenz's therapy, I certify that the treatment plan above will be carried out by a therapist or under their direction. Thank you for this referral,  Aura Agudelo PTA     Referring Physician Signature: Rajinder Ferrell MD          Date                       SUBJECTIVE:  Pt. Stated his pain was about the same with very little change. New Script from Dr. Carolyn Reddy: add in Right shoulder physical therapy 2 times a week for 4 weeks (12/21/16)    History of Present Injury/Illness (Reason for Referral): Patient is a 80year old male presenting to physical therapy with complaints of L shoulder pain and decreased functional mobility. Patient had MRI performed on 9/2/16 which indicated massive full thickness tear of supraspinatus and infraspinatus with 5 cm retraction, moderate degeneration and hypertrophy of AC joint and high riding humeral head- per chart review. Patient reports no incident of trauma or injury. He received an injection in his L shoulder in the end of September which he reports helping with pain and ROM. Patient states the injection is wearing off now and he has some increased pain. He has at times had pain 10/10 with quick overhead motions, but currently reports his pain being controlled because he knows what to do or not do with his L UE. Now, patient rates pain 0/10 at rest up to 4/10 with overhead reach. Patient presents with increased pain, decreased strength, decreased ROM, decreased flexibility, impaired posture, impaired overhead reach, decreased activity tolerance, and overall impaired functional mobility. Present Symptoms: aching, burning, tingling   Pain Intensity 1: 4  Pain Location 1: Shoulder  Pain Orientation 1: Left  Pain Intensity 2: 6  Pain Location 2: Shoulder  Pain Orientation 2: Right  Dominant Side: left  Past Medical History: Mr. Alana Goel  has a past medical history of Acute maxillary sinusitis; Acute pharyngitis; Aftercare for long-term (current) use of antiplatelets/antithrombotics (9/29/2016); Allergic rhinitis;  Anemia (9/29/2016); Backache; CAD (coronary artery disease) (9/29/2016); [facility]ot[facility] ([facility] (9/29/2016); Decreased libido; Diabetes ([facility] (9/29/2016); Dizziness and giddiness; ED (erectile dysfunction) (9/29/2016); Encounter for monitoring testosterone replacement therapy (9/29/2016); Fatigue; Fever blister; GERD (gastroesophageal reflux disease); Glaucoma (9/29/2016); Hearing difficulty of right ear (9/29/2016); High cholesterol; High risk medication use (9/29/2016); Hypertension (9/29/2016); Hyponatremia (9/29/2016); IBS (irritable bowel syndrome) (9/29/2016); Intertrigo (9/29/2016); Irregular heart beat (9/29/2016); Left shoulder pain; Mouth pain; Obstructive sleep apnea of adult (9/29/2016); Osteoporosis (9/29/2016); Palpitations; Plantar wart of left foot (9/29/2016); Rotator cuff tear; Sinus bradycardia (9/29/2016); Sinusitis; Skin infection; Sore throat; Special screening for malignant neoplasm of prostate; Subclinical hyperthyroidism; and Tinea corporis. He also  has a past surgical history that includes urological.    Current Medications:   Current Outpatient Prescriptions on File Prior to Encounter   Medication Sig Dispense Refill    latanoprost (XALATAN) 0.005 % ophthalmic solution USE 1 DROP IN BOTH EYES EVERY EVENING  4    denosumab (PROLIA) 60 mg/mL injection 60 mg by SubCUTAneous route. Last dose 9/19/2016      bisoprolol (ZEBETA) 5 mg tablet Take 0.5-1 Tabs by mouth daily. Indications: HYPERTENSION 90 Tab 3    metoclopramide HCl (REGLAN) 10 mg tablet Take 1 Tab by mouth daily. Takes once a day at bedtime 90 Tab 3    rosuvastatin (CRESTOR) 40 mg tablet Take 0.5-1 Tabs by mouth nightly. Indications: HYPERLIPIDEMIA 90 Tab 3    omega 3-dha-epa-fish oil (FISH OIL) 900-1,400 mg cpDR Take 2 Caps by mouth daily for 90 days.  Last dose 01/14/10   Indications: ARTERIOSCLEROTIC VASCULAR DISEASE PREVENTION 180 Cap 3    calcium citrate-vitamin D3 (CITRACAL + D) tablet One am(with centrum) and two pm   Indications: OSTEOPOROSIS 90 Tab 3    esomeprazole (NEXIUM) 20 mg capsule Take  by mouth daily. OTC, in the AM and before dinner to lower stomach acid.  sildenafil citrate (VIAGRA) 100 mg tablet One tablet every day for ED      magnesium oxide (MAG-OX) 400 mg tablet One tablet daily to prevent or treat low magnesium      timolol (TIMOPTIC) 0.25 % ophthalmic solution Administer 1 Drop to both eyes daily.  ranitidine (ZANTAC) 300 mg tablet Take 300 mg by mouth daily. Takes at bedtime       LATANOPROST (XALATAN OP) Apply 1 Drop to eye daily. One drop to each eye       aspirin 81 mg Tab Take 81 mg by mouth daily. Last dose 01/14/10       multivitamin (ONE A DAY) tablet Take 1 Tab by mouth daily. Centrum silver last dose 01/14/10        No current facility-administered medications on file prior to encounter. Date Last Reviewed: 1/23/2017    Social History/Home Situation: Patient lives in a private residence with his wife who can help him out as needed. He reports being modified independent at home with increased time needed for ADLs. Work/Activity History: Part time . OBJECTIVE:    Outcome Measure: Tool Used: Disabilities of the Arm, Shoulder and Hand (DASH) Questionnaire - Quick Version  Score:  Initial: 22/55  Most Recent: 23/55 (Date: (11/23/16 )                          31/55 (Date: 12/22/16)                          27/55 (Date: 12/29/16)   Interpretation of Score: The DASH is designed to measure the activities of daily living in person's with upper extremity dysfunction or pain. Each section is scored on a 1-5 scale, 5 representing the greatest disability. The scores of each section are added together for a total score of 55. Score 11 12-19 20-28 29-37 38-45 46-54 55   Modifier CH CI CJ CK CL CM CN     ?  Carrying, Moving, and Handling Objects:     - CURRENT STATUS: CJ - 20%-39% impaired, limited or restricted    - GOAL STATUS:  CI - 1%-19% impaired, limited or restricted    - D/C STATUS:  ---------------To be determined---------------      Observation/Postural and Gait Assessment: In standing: patient with forward head, rounded shoulders, L shoulder elevated with increase tension in L upper trapezius, L scapula depressed and protraction. Slumped posture in sitting- able to correct with cuing, but difficulty holding posture due to decreased endurance of postural muscles. Palpation:  Moderate tenderness with moderate tightness noted in L upper trapezius. Moderate tenderness to palpation of L teres minor and pectoralis muscles. Moderate tightness and tenderness in R upper trapezius and levator scapula. Moderate to significant tenderness R teres minor. ROM:   PROM Left (degrees) Right (degrees)   Shoulder Flexion 140 140   Shoulder Abduction 130 130   Shoulder Internal Rotation  80 85   Functional Internal Rotation L1 L5   Shoulder External Rotation 57 50   Functional External Rotation C7 (from C5) T2     Strength: Motion Tested Left   (*/5) Right  (*/5)   Shoulder Flexion 4 (from 4-) 4- (from 4)   Scaption 4 (from 4-) 4- (from 4)   Shoulder Abduction 5 (from 4+) 4-   Shoulder Internal Rotation  4+ 4   Shoulder External Rotation 2+ 3- (from 4-)   Elbow Flexion 5 5   Elbow Extension 5 5    (in lbs): arm at side with elbow flexed to 90 degrees 70 65     Special Tests:  Not formally assessed due to MRI results with massive rotator cuff tear and increased L shoulder pain with certain positions. Passive Accessory Motion Testing: Significant tightness with inferior and posterior glenohumeral joint mobilizations. Neurological Screen:  Myotomes: Key muscle strength testing for bilateral UE is WNL. Dermatomes: Sensation testing through bilateral UE for light touch is intact from C3 to T2.   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 Balance: Sitting and standing balance grossly intact. TREATMENT:    (In addition to Assessment/Re-Assessment sessions the following treatments were rendered)  Therapeutic Exercise: ( ):x 40 mins  Exercises per grid below to improve mobility, strength and coordination. Required minimal verbal cues to promote proper body alignment, promote proper body posture and promote proper body mechanics. Progressed resistance, range, repetitions and complexity of movement as indicated.    Date:  1/18/17 Date:  1/23/17 Date:  1/16/17   Activity/Exercise Parameters Parameters Parameters   Scapular retractions --- X 20 reps  X 20    Wall slides ---  No lift, 2 x 10 Wall slides, no lift, x 15   UBE Level 4, 4 minutes forward, 4 minutes backward Level 4, 4 minutes forward, 4 minutes backwards Level 5,  4 minutes forward, 4 minutes backwards   Finger ladder --- X 10 reps 5 sec hold  X 10 reps flexion & abduction    Wall push ups X 30 X 30 X 30    Band rows Blue and green  x 30 Blue band and red, 2 x 10 Blue band, 3 x 10   Band low row Blue  x 30 Green band, 2 x 10  Blue band, 3 x 10   Shoulder flexion Yellow band x 10 on L, tan band x 10 on R Yellow band x 10 on L, tan band x 10 on R Yellow band x 10 reps on left tan band x 10 on right   Shoulder scaption --- X 20 reps shoulder height Active 3 x 10 shoulder height   Bicep curl --- --- Cable cord, 7.5 lbs, each arm to fatigue   Tricep extension --- --- Cable cord, 7.5 lbs, each arm to fatigue   Pulley ----- --- X 5 minutes   Band internal rotation Green , 2 x 10 Green band 2x10 Green band 2x10 reps    Band external rotation Bilateral, hands close, gentle contraction, 2 x 10 red Bilateral hands close, gentle contraction 2x10 reps  Bilateral, hands close, gentle contraction, red, x 20 Wand external rotation --- Bilateral, 10 x 5 seconds each Bilateral, back against wall, 15 x 5 seconds each   Shoulder abduction Yellow band x 10 on L, tan band x 10 on R Yellow band x 10 reps LUE and tan band x 10 RUE           Manual Therapy (      15 minutes): Patient received PROM in all planes to tolerance to bilateral shoulders. Gentle distraction and oscillation to decrease pain and increase range of motion. Therapeutic Modalities: for pain:  Pt. Received moist hot pack to bilateral shoulders in sitting x 8 mins and Ice pack to bilateral shoulders in sitting after session x 8 mins. HEP: As above; handouts given to patient for all exercises. ______________________________________________________________________________________________________    Treatment Assessment: Patient demonstrated increased range of motion and less guarding today. Rated pain down to 5/10 on RUE and 3/10 on LUE at the end of today's session. Progression/Medical Necessity:   · Patient is expected to demonstrate progress in strength, range of motion, coordination and functional technique to increase independence with ADLs  and improve safety during overhead reach, ADLs, and recreational activities. · Skilled intervention continues to be required due to increased L shoulder pain with overhead reach, ADLs, and recreational activities. Compliance with Program/Exercises: Compliant most of the time. Reason for Continuation of Services/Other Comments:  · Patient continues to require skilled intervention due to increased L shoulder pain with decreased ROM hindering activity tolerance, functional mobility, and overall quality of life. Recommendations/Intent for next treatment session: \"Treatment next visit will focus on advancements to more challenging activities\". Continue manual therapy for R shoulder for pain control as tolerated. Progress exercises, postural, and functional mobility as able.     Re-certification 62/13/79: continue physical therapy 2 times a week for 4 weeks for bilateral shoulders    Total Treatment Duration:71  minutes   PT Patient Time In/Time Out  Time In: 0850  Time Out: 224 Zahida Vargas PTA

## 2017-01-25 ENCOUNTER — HOSPITAL ENCOUNTER (OUTPATIENT)
Dept: PHYSICAL THERAPY | Age: 72
Discharge: HOME | End: 2017-01-25
Attending: ORTHOPAEDIC SURGERY
Payer: MEDICARE

## 2017-01-25 PROCEDURE — 97140 MANUAL THERAPY 1/> REGIONS: CPT

## 2017-01-25 PROCEDURE — 97110 THERAPEUTIC EXERCISES: CPT

## 2017-01-25 PROCEDURE — G8984 CARRY CURRENT STATUS: HCPCS

## 2017-01-25 PROCEDURE — 97016 VASOPNEUMATIC DEVICE THERAPY: CPT

## 2017-01-25 PROCEDURE — G8985 CARRY GOAL STATUS: HCPCS

## 2017-01-25 NOTE — PROGRESS NOTES
Raquel Murphy   [phone]) [phone] at  [facility]  [address] [place] [address], [place], [address] [place] [address]  [phone]   [phone]         Outpatient PHYSICAL THERAPY: Daily Note, Progress Report and Recertification  Fall Risk Score: 2 (? 5 = High Risk)    ICD-10: Treatment Diagnosis: complete rotator cuff tear or rupture of left shoulder, not specified as traumatic (M75.122)  ICD-10: Treatment Diagnosis 2: pain in left shoulder (M25.512)  ICD-10: Treatment Diagnosis 3: complete rotator cuff tear or rupture of right shoulder, not specified as traumatic (M75.121)  ICD-10: Treatment Diagnosis 4: pain in right shoulder (M25.511)  DATE: 2017  REFERRING PHYSICIAN: Carlos Sandhu MD MD Orders: Evaluate and treat  Return Physician Appointment: 2017  MEDICAL/REFERRING DIAGNOSIS: Complete rotator cuff tear or rupture of left shoulder, not specified as traumatic [M75.122]  DATE OF ONSET: 2016   PRIOR LEVEL OF FUNCTION: independent  PRECAUTIONS: full massive rotator cuff tear- supraspinatus/ infraspinatus  ALLERGIES: No Known Allergies Patient denies allergy to latex, adhesives or creams. ASSESSMENT:  ?????? ? ? This section established at most recent assessment?????????? Patient is a 80year old male presenting to physical therapy with complaints of L shoulder pain and decreased functional mobility. Patient had MRI performed on 16 which indicated massive full thickness tear of supraspinatus and infraspinatus with 5 cm retraction, moderate degeneration and hypertrophy of AC joint and high riding humeral head- per chart review. Patient reports no incident of trauma or injury. He received an injection in his L shoulder in the end of September which he reports helping with pain and ROM. Patient states the injection is wearing off now and he has some increased pain.  He has at times had pain 10/10 with quick overhead motions, but currently reports his pain being controlled because he knows what to do or not do with his L UE. Now, patient rates pain 0/10 at rest up to 4/10 with overhead reach. Patient presents with increased pain, decreased strength, decreased ROM, decreased flexibility, impaired posture, impaired overhead reach, decreased activity tolerance, and overall impaired functional mobility. Patient is a good candidate for skilled physical therapy interventions to include manual therapy, therapeutic exercise, postural re-education, body mechanics training, and pain modalities as needed. Patient has been seen for 24 sessions of physical therapy from 10/28/16 to 1/25/17. He reports his L shoulder feeling much better since starting therapy with improvements in mobility, activity tolerance, and pain. His R shoulder has recently started to improve with some strength and function. Patient is motivated and using bilateral UEs as able at home and reports no interest in surgery at this time. Patient with improvements in strength, ROM, overall mobility, and activity tolerance. He has met many goals since starting therapy and is progressing with the rest. Patient should benefit from continued skilled therapy to address remaining strength and ROM deficits for bilateral shoulders. PROBLEM LIST (Impairments causing functional limitations):  1. Decreased Strength affecting function  2. Decreased ADL/Functional Activities  3. Decreased Flexibility/joint mobility  4. Decreased transfer abilities  5. Increased Pain affecting function  6. Decreased Activity tolerance   7. Decreased Pacing skills  8. Decreased Work Simplification/Energy Conservation techniques  GOALS: (Goals have been discussed and agreed upon with patient.)  SHORT-TERM FUNCTIONAL GOALS: Time Frame: 10/28/16 to 11/11/16   1. Patient will be independent with HEP to improve upright posture, overhead reach, and L UE ROM. -GOAL MET  2.  Patient will report no more than 0/10 L shoulder pain at rest in order to demonstrate improved self pain control and tolerance. -GOAL MET   3. Patient will improve upright posture including decreased forward head and rounded shoulders with improved L shoulder position in order to improve overhead reach. -GOAL MET  DISCHARGE GOALS: Time Frame: 1/25/17 to 2/24/17  1. Patient will improve gross overhead active ROM to 150 degrees in order to improve functional mobility and independence with ADLs. -GOAL MET  2. Patient will report no more than 4/10 L shoulder pain with overhead motions in order to demonstrate improved activity tolerance. -GOAL MET   3. Patient will report minimal to no pain with driving in order to demonstrate improve functional mobility. -ONGOING (L shoulder minimal pain, R with moderate pain- 1/25/17)  4. 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 -ONGOING (reports moderate R shoulder pain 1/25/17)  5. Patient will improve Disability of the Arm, Shoulder, and Hand score to 18/55 from 22/55. -ONGOING (scored 33/55 on 1/25/17)  6. NEW GOAL 12/29/16: Patient will report no more than 7/10 R shoulder pain with overhead motions in order to demonstrate improved activity tolerance. -ONGOING (reports 9/10 on 1/25/17)  7. NEW GOAL 12/29/16: Patient will report overall decreased R shoulder pain with improve mobility in order to return to prior functional level. -ONGOING  REHABILITATION POTENTIAL FOR STATED GOALS: [address] [place] [facility] OF CARE:  INTERVENTIONS PLANNED: (Benefits and precautions of physical therapy have been discussed with the patient.)  1. cold  2. heat  3. home exercise program (HEP)  4. manual therapy  5. neuromuscular re-education/strengthening  6. range of motion: active/assisted/passive  7. therapeutic activities  8. therapeutic exercise/strengthening  9. ultrasound  TREATMENT PLAN EFFECTIVE DATES: 1/25/17 to 2/24/17  FREQUENCY/DURATION: Follow patient 1 times a week for 4 weeks to address above goals.    Regarding Frances Lorenz's therapy, I certify that the treatment plan above will be carried out by a therapist or under their direction. Thank you for this referral,  Diana Garnica PT     Referring Physician Signature: Sunil Borjas MD          Date                       SUBJECTIVE:  \"I just started to feel a difference with my right shoulder and I think its slowly starting to get better. My left one is doing OK\"     New Script from Dr. Dee Gong: add in Right shoulder physical therapy 2 times a week for 4 weeks (12/21/16)    History of Present Injury/Illness (Reason for Referral): Patient is a 80year old male presenting to physical therapy with complaints of L shoulder pain and decreased functional mobility. Patient had MRI performed on 9/2/16 which indicated massive full thickness tear of supraspinatus and infraspinatus with 5 cm retraction, moderate degeneration and hypertrophy of AC joint and high riding humeral head- per chart review. Patient reports no incident of trauma or injury. He received an injection in his L shoulder in the end of September which he reports helping with pain and ROM. Patient states the injection is wearing off now and he has some increased pain. He has at times had pain 10/10 with quick overhead motions, but currently reports his pain being controlled because he knows what to do or not do with his L UE. Now, patient rates pain 0/10 at rest up to 4/10 with overhead reach. Patient presents with increased pain, decreased strength, decreased ROM, decreased flexibility, impaired posture, impaired overhead reach, decreased activity tolerance, and overall impaired functional mobility.   Present Symptoms: aching, burning, tingling   Pain Intensity 1: 4  Pain Location 1: Shoulder  Pain Orientation 1: Left  Pain Intensity 2: 7  Pain Location 2: Shoulder  Pain Orientation 2: Right  Dominant Side: left  Past Medical History: Mr. Sonny Willis  has a past medical history of Acute maxillary sinusitis; Acute pharyngitis; Aftercare for long-term (current) use of antiplatelets/antithrombotics (9/29/2016); Allergic rhinitis; Anemia (9/29/2016); Backache; CAD (coronary artery disease) (9/29/2016); Carotid artery disease ([facility] (9/29/2016); Decreased libido; Diabetes ([facility] (9/29/2016); Dizziness and giddiness; ED (erectile dysfunction) (9/29/2016); Encounter for monitoring testosterone replacement therapy (9/29/2016); Fatigue; Fever blister; GERD (gastroesophageal reflux disease); Glaucoma (9/29/2016); Hearing difficulty of right ear (9/29/2016); High cholesterol; High risk medication use (9/29/2016); Hypertension (9/29/2016); Hyponatremia (9/29/2016); IBS (irritable bowel syndrome) (9/29/2016); Intertrigo (9/29/2016); Irregular heart beat (9/29/2016); Left shoulder pain; Mouth pain; Obstructive sleep apnea of adult (9/29/2016); Osteoporosis (9/29/2016); Palpitations; Plantar wart of left foot (9/29/2016); Rotator cuff tear; Sinus bradycardia (9/29/2016); Sinusitis; Skin infection; Sore throat; Special screening for malignant neoplasm of prostate; Subclinical hyperthyroidism; and Tinea corporis. He also  has a past surgical history that includes urological.    Current Medications:   Current Outpatient Prescriptions on File Prior to Encounter   Medication Sig Dispense Refill    latanoprost (XALATAN) 0.005 % ophthalmic solution USE 1 DROP IN BOTH EYES EVERY EVENING  4    denosumab (PROLIA) 60 mg/mL injection 60 mg by SubCUTAneous route. Last dose 9/19/2016      bisoprolol (ZEBETA) 5 mg tablet Take 0.5-1 Tabs by mouth daily. Indications: HYPERTENSION 90 Tab 3    metoclopramide HCl (REGLAN) 10 mg tablet Take 1 Tab by mouth daily. Takes once a day at bedtime 90 Tab 3    rosuvastatin (CRESTOR) 40 mg tablet Take 0.5-1 Tabs by mouth nightly. Indications: HYPERLIPIDEMIA 90 Tab 3    omega 3-dha-epa-fish oil (FISH OIL) 900-1,400 mg cpDR Take 2 Caps by mouth daily for 90 days.  Last dose 01/14/10   Indications: ARTERIOSCLEROTIC VASCULAR DISEASE PREVENTION 180 Cap 3    calcium citrate-vitamin D3 (CITRACAL + D) tablet One am(with centrum) and two pm   Indications: OSTEOPOROSIS 90 Tab 3    esomeprazole (NEXIUM) 20 mg capsule Take  by mouth daily. OTC, in the AM and before dinner to lower stomach acid.  sildenafil citrate (VIAGRA) 100 mg tablet One tablet every day for ED      magnesium oxide (MAG-OX) 400 mg tablet One tablet daily to prevent or treat low magnesium      timolol (TIMOPTIC) 0.25 % ophthalmic solution Administer 1 Drop to both eyes daily.  ranitidine (ZANTAC) 300 mg tablet Take 300 mg by mouth daily. Takes at bedtime       LATANOPROST (XALATAN OP) Apply 1 Drop to eye daily. One drop to each eye       aspirin 81 mg Tab Take 81 mg by mouth daily. Last dose 01/14/10       multivitamin (ONE A DAY) tablet Take 1 Tab by mouth daily. Centrum silver last dose 01/14/10        No current facility-administered medications on file prior to encounter. Date Last Reviewed: 1/25/2017    Social History/Home Situation: Patient lives in a private residence with his wife who can help him out as needed. He reports being modified independent at home with increased time needed for ADLs. Work/Activity History: Part time . OBJECTIVE:    Outcome Measure: Tool Used: Disabilities of the Arm, Shoulder and Hand (DASH) Questionnaire - Quick Version  Score:  Initial: 22/55  Most Recent: 23/55 (Date: (11/23/16 )                          31/55 (Date: 12/22/16)                          27/55 (Date: 12/29/16)                          33/55 (Date: 1/25/17)   Interpretation of Score: The DASH is designed to measure the activities of daily living in person's with upper extremity dysfunction or pain. Each section is scored on a 1-5 scale, 5 representing the greatest disability. The scores of each section are added together for a total score of 55.     Score 11 12-19 20-28 29-37 38-45 46-54 55   Modifier CH CI CJ CK CL CM CN ? Carrying, Moving, and Handling Objects:     - CURRENT STATUS: CK - 40%-59% impaired, limited or restricted    - GOAL STATUS:  CI - 1%-19% impaired, limited or restricted    - D/C STATUS:  ---------------To be determined---------------      Observation/Postural and Gait Assessment: In standing: patient with forward head, rounded shoulders, L shoulder elevated with increase tension in L upper trapezius, L scapula depressed and protraction. Slumped posture in sitting- able to correct with cuing, but difficulty holding posture due to decreased endurance of postural muscles. Palpation:  Moderate tenderness with moderate tightness noted in L upper trapezius. Moderate tenderness to palpation of L teres minor and pectoralis muscles. Moderate tightness and tenderness in R upper trapezius and levator scapula. Moderate to significant tenderness R teres minor. ROM:   PROM Left (degrees) Right (degrees)   Shoulder Flexion 150 (from 140) 154 (from 140)   Shoulder Abduction 135 (from 130) 130   Shoulder Internal Rotation  85 (from 80) 85   Functional Internal Rotation L1 L3 (from L5)   Shoulder External Rotation 67 (from 57) 65 (from 50)   Functional External Rotation C7 (from C5) T2     Strength: Motion Tested Left   (*/5) Right  (*/5)   Shoulder Flexion 4 (from 4-) 4- (from 4)   Scaption 4 (from 4-) 4- (from 4)   Shoulder Abduction 5 (from 4+) 4-   Shoulder Internal Rotation  4+ 4+ (from 4)   Shoulder External Rotation 2+ 3- (from 4-)   Elbow Flexion 5 5   Elbow Extension 5 5    (in lbs): arm at side with elbow flexed to 90 degrees 70 65     Special Tests:  Not formally assessed due to MRI results with massive rotator cuff tear and increased L shoulder pain with certain positions. Passive Accessory Motion Testing: Moderate (from significant) tightness with inferior and posterior glenohumeral joint mobilizations.      Neurological Screen:  Myotomes: Key muscle strength testing for bilateral UE is WNL. Dermatomes: Sensation testing through bilateral UE for light touch is intact from C3 to T2. 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 Balance: Sitting and standing balance grossly intact. TREATMENT:    (In addition to Assessment/Re-Assessment sessions the following treatments were rendered)  Therapeutic Exercise: (30 Minutes): Exercises per grid below to improve mobility, strength and coordination. Required minimal verbal cues to promote proper body alignment, promote proper body posture and promote proper body mechanics. Progressed resistance, range, repetitions and complexity of movement as indicated.    Date:  1/18/17 Date:  1/23/17 Date:  1/25/17   Activity/Exercise Parameters Parameters Parameters   Scapular retractions --- X 20 reps  X 20    Wall slides ---  No lift, 2 x 10 Wall slides, no lift, x 20   UBE Level 4, 4 minutes forward, 4 minutes backward Level 4, 4 minutes forward, 4 minutes backwards Level 5,  3 minutes forward, 3 minutes backwards   Finger ladder --- X 10 reps 5 sec hold  ---    Wall push ups X 30 X 30 X 30    Band rows Blue and green  x 30 Blue band and red, 2 x 10 Blue and green band, 2 x 10   Band low row Blue  x 30 Green band, 2 x 10  Blue band, 2 x 10   Shoulder flexion Yellow band x 10 on L, tan band x 10 on R Yellow band x 10 on L, tan band x 10 on R ---   Shoulder scaption --- X 20 reps shoulder height ---   Bicep curl --- --- Blue band, bilateral, 2 x 10   Tricep extension --- --- Blue band, bilateral, 2 x 10   Band internal rotation Green , 2 x 10 Green band 2x10 ---   Band external rotation Bilateral, hands close, gentle contraction, 2 x 10 red Bilateral hands close, gentle contraction 2x10 reps  Bilateral, hands close, gentle contraction, red, x 20   Wand external rotation --- Bilateral, 10 x 5 seconds each ---   Shoulder abduction Yellow band x 10 on L, tan band x 10 on R Yellow band x 10 reps LUE and tan band x 10 RUE  ---         Manual Therapy (      20 minutes): manual strength and ROM measurements; Patient received PROM in all planes to tolerance to R shoulder; Gentle distraction, oscillations and grade I-II glenohumeral joint mobilizations on R to decrease pain. Therapeutic Modalities: for pain:    Right Shoulder Heat  Type: Moist pack  Duration : 10 minutes  Patient Position: Sitting     Left Shoulder Heat  Type: Moist pack  Duration : 10 minutes  Patient Position: Sitting        Right Shoulder Cold  Type:  (vasopneumatic compression)  Duration : 15 minutes  Patient Position: Sitting     Left Shoulder Cold  Type: Cold/ice pack  Duration : 15 minutes  Patient Position: Sitting                                                                      HEP: As above; handouts given to patient for all exercises. ______________________________________________________________________________________________________    Treatment Assessment: Patient with good activity tolerance and progressing well overall. He would like to continue with therapy to continue with progression of R shoulder pain, mobility, and function. Plan to add to HEP and address pain and mobility as tolerated. Patient with 2/10 L shoulder pain and 5/10 R shoulder pain at end of session. Progression/Medical Necessity:   · Patient is expected to demonstrate progress in strength, range of motion, coordination and functional technique to increase independence with ADLs  and improve safety during overhead reach, ADLs, and recreational activities.   · Skilled intervention continues to be required due to increased L shoulder pain with overhead reach, ADLs, and recreational activities. Compliance with Program/Exercises: Compliant most of the time. Reason for Continuation of Services/Other Comments:  · Patient continues to require skilled intervention due to increased L shoulder pain with decreased ROM hindering activity tolerance, functional mobility, and overall quality of life. Recommendations/Intent for next treatment session: \"Treatment next visit will focus on advancements to more challenging activities\". Continue manual therapy for R shoulder for pain control as tolerated. Progress exercises, postural, and functional mobility as able. Re-certification 7/90/83: continue physical therapy 1 time a week for 4 weeks for bilateral shoulders.     Total Treatment Duration: 75 minutes   PT Patient Time In/Time Out  Time In: 0850  Time Out: Warren Kasper PT

## 2017-02-01 ENCOUNTER — HOSPITAL ENCOUNTER (OUTPATIENT)
Dept: PHYSICAL THERAPY | Age: 72
Discharge: HOME | End: 2017-02-01
Attending: ORTHOPAEDIC SURGERY
Payer: MEDICARE

## 2017-02-01 PROCEDURE — 97016 VASOPNEUMATIC DEVICE THERAPY: CPT

## 2017-02-01 PROCEDURE — 97110 THERAPEUTIC EXERCISES: CPT

## 2017-02-01 PROCEDURE — 97140 MANUAL THERAPY 1/> REGIONS: CPT

## 2017-02-01 NOTE — PROGRESS NOTES
Phan Vu   [phone]) [phone] at  [address], [place], [address] [place] [address]  [phone]   [phone]         Outpatient PHYSICAL THERAPY: Daily Note  Fall Risk Score: 2 (? 5 = High Risk)    ICD-10: Treatment Diagnosis: complete rotator cuff tear or rupture of left shoulder, not specified as traumatic (M75.122)  ICD-10: Treatment Diagnosis 2: pain in left shoulder (M25.512)  ICD-10: Treatment Diagnosis 3: complete rotator cuff tear or rupture of right shoulder, not specified as traumatic (M75.121)  ICD-10: Treatment Diagnosis 4: pain in right shoulder (M25.511)  DATE: 2017  REFERRING PHYSICIAN: Michael Cazares MD MD Orders: Evaluate and treat  Return Physician Appointment: 2017  MEDICAL/REFERRING DIAGNOSIS: Complete rotator cuff tear or rupture of left shoulder, not specified as traumatic [M75.122]  DATE OF ONSET: 2016   PRIOR LEVEL OF FUNCTION: independent  PRECAUTIONS: full massive rotator cuff tear- supraspinatus/ infraspinatus  ALLERGIES: No Known Allergies Patient denies allergy to latex, adhesives or creams. ASSESSMENT:  ?????? ? ? This section established at most recent assessment?????????? Patient is a 80year old male presenting to physical therapy with complaints of L shoulder pain and decreased functional mobility. Patient had MRI performed on 16 which indicated massive full thickness tear of supraspinatus and infraspinatus with 5 cm retraction, moderate degeneration and hypertrophy of AC joint and high riding humeral head- per chart review. Patient reports no incident of trauma or injury. He received an injection in his L shoulder in the end of September which he reports helping with pain and ROM. Patient states the injection is wearing off now and he has some increased pain.  He has at times had pain 10/10 with quick overhead motions, but currently reports his pain being controlled because he knows what to do or not do with his L UE. Now, patient rates pain 0/10 at rest up to 4/10 with overhead reach. Patient presents with increased pain, decreased strength, decreased ROM, decreased flexibility, impaired posture, impaired overhead reach, decreased activity tolerance, and overall impaired functional mobility. Patient is a good candidate for skilled physical therapy interventions to include manual therapy, therapeutic exercise, postural re-education, body mechanics training, and pain modalities as needed. Patient has been seen for 24 sessions of physical therapy from 10/28/16 to 1/25/17. He reports his L shoulder feeling much better since starting therapy with improvements in mobility, activity tolerance, and pain. His R shoulder has recently started to improve with some strength and function. Patient is motivated and using bilateral UEs as able at home and reports no interest in surgery at this time. Patient with improvements in strength, ROM, overall mobility, and activity tolerance. He has met many goals since starting therapy and is progressing with the rest. Patient should benefit from continued skilled therapy to address remaining strength and ROM deficits for bilateral shoulders. PROBLEM LIST (Impairments causing functional limitations):  1. Decreased Strength affecting function  2. Decreased ADL/Functional Activities  3. Decreased Flexibility/joint mobility  4. Decreased transfer abilities  5. Increased Pain affecting function  6. Decreased Activity tolerance   7. Decreased Pacing skills  8. Decreased Work Simplification/Energy Conservation techniques  GOALS: (Goals have been discussed and agreed upon with patient.)  SHORT-TERM FUNCTIONAL GOALS: Time Frame: 10/28/16 to 11/11/16   1. Patient will be independent with HEP to improve upright posture, overhead reach, and L UE ROM. -GOAL MET  2. Patient will report no more than 0/10 L shoulder pain at rest in order to demonstrate improved self pain control and tolerance. -GOAL MET 3. Patient will improve upright posture including decreased forward head and rounded shoulders with improved L shoulder position in order to improve overhead reach. -GOAL MET  DISCHARGE GOALS: Time Frame: 1/25/17 to 2/24/17  1. Patient will improve gross overhead active ROM to 150 degrees in order to improve functional mobility and independence with ADLs. -GOAL MET  2. Patient will report no more than 4/10 L shoulder pain with overhead motions in order to demonstrate improved activity tolerance. -GOAL MET   3. Patient will report minimal to no pain with driving in order to demonstrate improve functional mobility. -ONGOING (L shoulder minimal pain, R with moderate pain- 1/25/17)  4. 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 -ONGOING (reports moderate R shoulder pain 1/25/17)  5. Patient will improve Disability of the Arm, Shoulder, and Hand score to 18/55 from 22/55. -ONGOING (scored 33/55 on 1/25/17)  6. NEW GOAL 12/29/16: Patient will report no more than 7/10 R shoulder pain with overhead motions in order to demonstrate improved activity tolerance. -ONGOING (reports 9/10 on 1/25/17)  7. NEW GOAL 12/29/16: Patient will report overall decreased R shoulder pain with improve mobility in order to return to prior functional level. -ONGOING  REHABILITATION POTENTIAL FOR STATED GOALS: [facility]:  INTERVENTIONS PLANNED: (Benefits and precautions of physical therapy have been discussed with the patient.)  1. cold  2. heat  3. home exercise program (HEP)  4. manual therapy  5. neuromuscular re-education/strengthening  6. range of motion: active/assisted/passive  7. therapeutic activities  8. therapeutic exercise/strengthening  9. ultrasound  TREATMENT PLAN EFFECTIVE DATES: 1/25/17 to 2/24/17  FREQUENCY/DURATION: Follow patient 1 times a week for 4 weeks to address above goals.    Regarding Ferny Lorenz's therapy, I certify that the treatment plan above will be carried out by a therapist or under their direction. Thank you for this referral,  Monster Pelaez PT     Referring Physician Signature: Uri Schaefer MD          Date                       SUBJECTIVE:  \"I am a little sore today from doing a little more yesterday, but I am doing OK. \"     Emeli Dasilva from Dr. Piper Samayoa: add in Right shoulder physical therapy 2 times a week for 4 weeks (12/21/16)    History of Present Injury/Illness (Reason for Referral): Patient is a 80year old male presenting to physical therapy with complaints of L shoulder pain and decreased functional mobility. Patient had MRI performed on 9/2/16 which indicated massive full thickness tear of supraspinatus and infraspinatus with 5 cm retraction, moderate degeneration and hypertrophy of AC joint and high riding humeral head- per chart review. Patient reports no incident of trauma or injury. He received an injection in his L shoulder in the end of September which he reports helping with pain and ROM. Patient states the injection is wearing off now and he has some increased pain. He has at times had pain 10/10 with quick overhead motions, but currently reports his pain being controlled because he knows what to do or not do with his L UE. Now, patient rates pain 0/10 at rest up to 4/10 with overhead reach. Patient presents with increased pain, decreased strength, decreased ROM, decreased flexibility, impaired posture, impaired overhead reach, decreased activity tolerance, and overall impaired functional mobility. Present Symptoms: aching, burning, tingling   Pain Intensity 1: 5  Pain Location 1: Shoulder  Pain Orientation 1: Left  Pain Intensity 2: 7  Pain Location 2: Shoulder  Pain Orientation 2: Right  Dominant Side: left  Past Medical History: Mr. Josh Xavier  has a past medical history of Acute maxillary sinusitis; Acute pharyngitis;  Aftercare for long-term (current) use of antiplatelets/antithrombotics (9/29/2016); Allergic rhinitis; Anemia (9/29/2016); Backache; CAD (coronary artery disease) (9/29/2016); Carotid artery disease ([facility] (9/29/2016); Decreased libido; Diabetes ([facility] (9/29/2016); Dizziness and giddiness; ED (erectile dysfunction) (9/29/2016); Encounter for monitoring testosterone replacement therapy (9/29/2016); Fatigue; Fever blister; GERD (gastroesophageal reflux disease); Glaucoma (9/29/2016); Hearing difficulty of right ear (9/29/2016); High cholesterol; High risk medication use (9/29/2016); Hypertension (9/29/2016); Hyponatremia (9/29/2016); IBS (irritable bowel syndrome) (9/29/2016); Intertrigo (9/29/2016); Irregular heart beat (9/29/2016); Left shoulder pain; Mouth pain; Obstructive sleep apnea of adult (9/29/2016); Osteoporosis (9/29/2016); Palpitations; Plantar wart of left foot (9/29/2016); Rotator cuff tear; Sinus bradycardia (9/29/2016); Sinusitis; Skin infection; Sore throat; Special screening for malignant neoplasm of prostate; Subclinical hyperthyroidism; and Tinea corporis. He also  has a past surgical history that includes urological.    Current Medications:   Current Outpatient Prescriptions on File Prior to Encounter   Medication Sig Dispense Refill    latanoprost (XALATAN) 0.005 % ophthalmic solution USE 1 DROP IN BOTH EYES EVERY EVENING  4    denosumab (PROLIA) 60 mg/mL injection 60 mg by SubCUTAneous route. Last dose 9/19/2016      bisoprolol (ZEBETA) 5 mg tablet Take 0.5-1 Tabs by mouth daily. Indications: HYPERTENSION 90 Tab 3    metoclopramide HCl (REGLAN) 10 mg tablet Take 1 Tab by mouth daily. Takes once a day at bedtime 90 Tab 3    rosuvastatin (CRESTOR) 40 mg tablet Take 0.5-1 Tabs by mouth nightly. Indications: HYPERLIPIDEMIA 90 Tab 3    omega 3-dha-epa-fish oil (FISH OIL) 900-1,400 mg cpDR Take 2 Caps by mouth daily for 90 days.  Last dose 01/14/10   Indications: ARTERIOSCLEROTIC VASCULAR DISEASE PREVENTION 180 Cap 3    calcium citrate-vitamin D3 (CITRACAL + D) tablet One am(with centrum) and two pm   Indications: OSTEOPOROSIS 90 Tab 3    esomeprazole (NEXIUM) 20 mg capsule Take  by mouth daily. OTC, in the AM and before dinner to lower stomach acid.  sildenafil citrate (VIAGRA) 100 mg tablet One tablet every day for ED      magnesium oxide (MAG-OX) 400 mg tablet One tablet daily to prevent or treat low magnesium      timolol (TIMOPTIC) 0.25 % ophthalmic solution Administer 1 Drop to both eyes daily.  ranitidine (ZANTAC) 300 mg tablet Take 300 mg by mouth daily. Takes at bedtime       LATANOPROST (XALATAN OP) Apply 1 Drop to eye daily. One drop to each eye       aspirin 81 mg Tab Take 81 mg by mouth daily. Last dose 01/14/10       multivitamin (ONE A DAY) tablet Take 1 Tab by mouth daily. Centrum silver last dose 01/14/10        No current facility-administered medications on file prior to encounter. Date Last Reviewed: 2/1/2017    Social History/Home Situation: Patient lives in a private residence with his wife who can help him out as needed. He reports being modified independent at home with increased time needed for ADLs. Work/Activity History: Part time . OBJECTIVE:    Outcome Measure: Tool Used: Disabilities of the Arm, Shoulder and Hand (DASH) Questionnaire - Quick Version  Score:  Initial: 22/55  Most Recent: 23/55 (Date: (11/23/16 )                          31/55 (Date: 12/22/16)                          27/55 (Date: 12/29/16)                          33/55 (Date: 1/25/17)   Interpretation of Score: The DASH is designed to measure the activities of daily living in person's with upper extremity dysfunction or pain. Each section is scored on a 1-5 scale, 5 representing the greatest disability. The scores of each section are added together for a total score of 55. Score 11 12-19 20-28 29-37 38-45 46-54 55   Modifier CH CI CJ CK CL CM CN     ?  Carrying, Moving, and Handling Objects:     - CURRENT STATUS: CK - 40%-59% impaired, limited or restricted    - GOAL STATUS:  CI - 1%-19% impaired, limited or restricted    - D/C STATUS:  ---------------To be determined---------------      Observation/Postural and Gait Assessment: In standing: patient with forward head, rounded shoulders, L shoulder elevated with increase tension in L upper trapezius, L scapula depressed and protraction. Slumped posture in sitting- able to correct with cuing, but difficulty holding posture due to decreased endurance of postural muscles. Palpation:  Moderate tenderness with moderate tightness noted in L upper trapezius. Moderate tenderness to palpation of L teres minor and pectoralis muscles. Moderate tightness and tenderness in R upper trapezius and levator scapula. Moderate to significant tenderness R teres minor. ROM:   PROM Left (degrees) Right (degrees)   Shoulder Flexion 150 (from 140) 154 (from 140)   Shoulder Abduction 135 (from 130) 130   Shoulder Internal Rotation  85 (from 80) 85   Functional Internal Rotation L1 L3 (from L5)   Shoulder External Rotation 67 (from 57) 65 (from 50)   Functional External Rotation C7 (from C5) T2     Strength: Motion Tested Left   (*/5) Right  (*/5)   Shoulder Flexion 4 (from 4-) 4- (from 4)   Scaption 4 (from 4-) 4- (from 4)   Shoulder Abduction 5 (from 4+) 4-   Shoulder Internal Rotation  4+ 4+ (from 4)   Shoulder External Rotation 2+ 3- (from 4-)   Elbow Flexion 5 5   Elbow Extension 5 5    (in lbs): arm at side with elbow flexed to 90 degrees 70 65     Special Tests:  Not formally assessed due to MRI results with massive rotator cuff tear and increased L shoulder pain with certain positions. Passive Accessory Motion Testing: Moderate (from significant) tightness with inferior and posterior glenohumeral joint mobilizations. Neurological Screen:  Myotomes: Key muscle strength testing for bilateral UE is WNL.   Dermatomes: Sensation testing through bilateral UE for light touch is intact from C3 to T2.  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 Balance: Sitting and standing balance grossly intact. TREATMENT:    (In addition to Assessment/Re-Assessment sessions the following treatments were rendered)  Therapeutic Exercise: (30 Minutes): Exercises per grid below to improve mobility, strength and coordination. Required minimal verbal cues to promote proper body alignment, promote proper body posture and promote proper body mechanics. Progressed resistance, range, repetitions and complexity of movement as indicated.    Date:  2/1/17 Date:  1/23/17 Date:  1/25/17   Activity/Exercise Parameters Parameters Parameters   Scapular retractions --- X 20 reps  X 20    Wall slides ---  No lift, 2 x 10 Wall slides, no lift, x 20   UBE Level 7, 2 minutes forward, 2 minutes backward Level 4, 4 minutes forward, 4 minutes backwards Level 5,  3 minutes forward, 3 minutes backwards   Finger ladder --- X 10 reps 5 sec hold  ---    Wall push ups X 30 X 30 X 30    Band rows Cable cord, double arm,15 lbs x 10, 12.5 lbs x 10 Blue band and red, 2 x 10 Blue and green band, 2 x 10   Band low row Blue  x 30 Green band, 2 x 10  Blue band, 2 x 10   Shoulder flexion Active, 2 x 10 to shoulder height Yellow band x 10 on L, tan band x 10 on R ---   Shoulder scaption Active, 2 x 10 to shoulder height X 20 reps shoulder height ---   Bicep curl Cable cord, 10 lbs, x 15 each --- Blue band, bilateral, 2 x 10   Tricep extension Cable cord, 10 lbs, x 15 each --- Blue band, bilateral, 2 x 10   Band internal rotation --- Green band 2x10 ---   Band external rotation --- Bilateral hands close, gentle contraction 2x10 reps Bilateral, hands close, gentle contraction, red, x 20   Wand external rotation --- Bilateral, 10 x 5 seconds each ---   Shoulder abduction Active, x 10 each Yellow band x 10 reps LUE and tan band x 10 RUE  ---         Manual Therapy (      10 minutes): Patient received PROM in all planes to tolerance to R shoulder; Gentle distraction, oscillations on R to decrease pain. Therapeutic Modalities: for pain:    Right Shoulder Heat  Type: Moist pack  Duration : 10 minutes  Patient Position: Sitting     Left Shoulder Heat  Type: Moist pack  Duration : 10 minutes  Patient Position: Sitting        Right Shoulder Cold  Type:  (vasopneumatic compression)  Duration : 15 minutes  Patient Position: Sitting     Left Shoulder Cold  Type: Cold/ice pack  Duration : 15 minutes  Patient Position: Sitting                                                                      HEP: As above; handouts given to patient for all exercises. ______________________________________________________________________________________________________    Treatment Assessment: Patient with improved exercise tolerance and motion today. Decreased pain to 5/10 in R and 3/10 in L by end of treatment. Good response to manual therapy. Progression/Medical Necessity:   · Patient is expected to demonstrate progress in strength, range of motion, coordination and functional technique to increase independence with ADLs  and improve safety during overhead reach, ADLs, and recreational activities. · Skilled intervention continues to be required due to increased L shoulder pain with overhead reach, ADLs, and recreational activities. Compliance with Program/Exercises: Compliant most of the time. Reason for Continuation of Services/Other Comments:  · Patient continues to require skilled intervention due to increased L shoulder pain with decreased ROM hindering activity tolerance, functional mobility, and overall quality of life.   Recommendations/Intent for next treatment session: \"Treatment next visit will focus on advancements to more challenging activities\". Continue manual therapy for R shoulder for pain control as tolerated. Progress exercises, postural, and functional mobility as able. Re-certification 0/63/99: continue physical therapy 1 time a week for 4 weeks for bilateral shoulders.     Total Treatment Duration: 65 minutes   PT Patient Time In/Time Out  Time In: 0800  Time Out: 0905    Tania Valladares PT

## 2017-02-08 ENCOUNTER — HOSPITAL ENCOUNTER (OUTPATIENT)
Dept: PHYSICAL THERAPY | Age: 72
Discharge: HOME | End: 2017-02-08
Attending: ORTHOPAEDIC SURGERY
Payer: MEDICARE

## 2017-02-08 PROCEDURE — 97140 MANUAL THERAPY 1/> REGIONS: CPT

## 2017-02-08 PROCEDURE — 97110 THERAPEUTIC EXERCISES: CPT

## 2017-02-08 PROCEDURE — 97016 VASOPNEUMATIC DEVICE THERAPY: CPT

## 2017-02-08 NOTE — PROGRESS NOTES
Peterson Kruse   (:1945) [address] at  [address], [place], [address] [place] [address]  [phone]   [phone]         Outpatient PHYSICAL THERAPY: Daily Note  Fall Risk Score: 2 (? 5 = High Risk)    ICD-10: Treatment Diagnosis: complete rotator cuff tear or rupture of left shoulder, not specified as traumatic (M75.122)  ICD-10: Treatment Diagnosis 2: pain in left shoulder (M25.512)  ICD-10: Treatment Diagnosis 3: complete rotator cuff tear or rupture of right shoulder, not specified as traumatic (M75.121)  ICD-10: Treatment Diagnosis 4: pain in right shoulder (M25.511)  DATE: 2017  REFERRING PHYSICIAN: Sunil Borjas MD MD Orders: Evaluate and treat  Return Physician Appointment: 2017  MEDICAL/REFERRING DIAGNOSIS: Complete rotator cuff tear or rupture of left shoulder, not specified as traumatic [M75.122]  DATE OF ONSET: 2016   PRIOR LEVEL OF FUNCTION: independent  PRECAUTIONS: full massive rotator cuff tear- supraspinatus/ infraspinatus  ALLERGIES: No Known Allergies Patient denies allergy to latex, adhesives or creams. ASSESSMENT:  ?????? ? ? This section established at most recent assessment?????????? Patient is a 80year old male presenting to physical therapy with complaints of L shoulder pain and decreased functional mobility. Patient had MRI performed on 16 which indicated massive full thickness tear of supraspinatus and infraspinatus with 5 cm retraction, moderate degeneration and hypertrophy of AC joint and high riding humeral head- per chart review. Patient reports no incident of trauma or injury. He received an injection in his L shoulder in the end of September which he reports helping with pain and ROM. Patient states the injection is wearing off now and he has some increased pain.  He has at times had pain 10/10 with quick overhead motions, but currently reports his pain being controlled because he knows what to do or not do with his L UE. Now, patient rates pain 0/10 at rest up to 4/10 with overhead reach. Patient presents with increased pain, decreased strength, decreased ROM, decreased flexibility, impaired posture, impaired overhead reach, decreased activity tolerance, and overall impaired functional mobility. Patient is a good candidate for skilled physical therapy interventions to include manual therapy, therapeutic exercise, postural re-education, body mechanics training, and pain modalities as needed. Patient has been seen for 24 sessions of physical therapy from 10/28/16 to 1/25/17. He reports his L shoulder feeling much better since starting therapy with improvements in mobility, activity tolerance, and pain. His R shoulder has recently started to improve with some strength and function. Patient is motivated and using bilateral UEs as able at home and reports no interest in surgery at this time. Patient with improvements in strength, ROM, overall mobility, and activity tolerance. He has met many goals since starting therapy and is progressing with the rest. Patient should benefit from continued skilled therapy to address remaining strength and ROM deficits for bilateral shoulders. PROBLEM LIST (Impairments causing functional limitations):  1. Decreased Strength affecting function  2. Decreased ADL/Functional Activities  3. Decreased Flexibility/joint mobility  4. Decreased transfer abilities  5. Increased Pain affecting function  6. Decreased Activity tolerance   7. Decreased Pacing skills  8. Decreased Work Simplification/Energy Conservation techniques  GOALS: (Goals have been discussed and agreed upon with patient.)  SHORT-TERM FUNCTIONAL GOALS: Time Frame: 10/28/16 to 11/11/16   1. Patient will be independent with HEP to improve upright posture, overhead reach, and L UE ROM. -GOAL MET  2. Patient will report no more than 0/10 L shoulder pain at rest in order to demonstrate improved self pain control and tolerance. -GOAL MET 3. Patient will improve upright posture including decreased forward head and rounded shoulders with improved L shoulder position in order to improve overhead reach. -GOAL MET  DISCHARGE GOALS: Time Frame: 1/25/17 to 2/24/17  1. Patient will improve gross overhead active ROM to 150 degrees in order to improve functional mobility and independence with ADLs. -GOAL MET  2. Patient will report no more than 4/10 L shoulder pain with overhead motions in order to demonstrate improved activity tolerance. -GOAL MET   3. Patient will report minimal to no pain with driving in order to demonstrate improve functional mobility. -ONGOING (L shoulder minimal pain, R with moderate pain- 1/25/17)  4. 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 -ONGOING (reports moderate R shoulder pain 1/25/17)  5. Patient will improve Disability of the Arm, Shoulder, and Hand score to 18/55 from 22/55. -ONGOING (scored 33/55 on 1/25/17)  6. NEW GOAL 12/29/16: Patient will report no more than 7/10 R shoulder pain with overhead motions in order to demonstrate improved activity tolerance. -ONGOING (reports 9/10 on 1/25/17)  7. NEW GOAL 12/29/16: Patient will report overall decreased R shoulder pain with improve mobility in order to return to prior functional level. -ONGOING  REHABILITATION POTENTIAL FOR STATED GOALS: Genice Los OF CARE:  INTERVENTIONS PLANNED: (Benefits and precautions of physical therapy have been discussed with the patient.)  1. cold  2. heat  3. home exercise program (HEP)  4. manual therapy  5. neuromuscular re-education/strengthening  6. range of motion: active/assisted/passive  7. therapeutic activities  8. therapeutic exercise/strengthening  9. ultrasound  TREATMENT PLAN EFFECTIVE DATES: 1/25/17 to 2/24/17  FREQUENCY/DURATION: Follow patient 1 times a week for 4 weeks to address above goals.    Regarding Tata Lorenz's therapy, I certify that the treatment plan above will be carried out by a therapist or under their direction. Thank you for this referral,  Lizbeth Simon PT     Referring Physician Signature: Greg Woods MD          Date                       SUBJECTIVE:  \"This right shoulder is still giving me troubles when I try to use it, but I think they both feel a little better today than they did. I was able to bolt down my gun safe with my left arm the other day so that was good. \"     Ted Viera from Dr. Laila Brandon: add in Right shoulder physical therapy 2 times a week for 4 weeks (12/21/16)    History of Present Injury/Illness (Reason for Referral): Patient is a 80year old male presenting to physical therapy with complaints of L shoulder pain and decreased functional mobility. Patient had MRI performed on 9/2/16 which indicated massive full thickness tear of supraspinatus and infraspinatus with 5 cm retraction, moderate degeneration and hypertrophy of AC joint and high riding humeral head- per chart review. Patient reports no incident of trauma or injury. He received an injection in his L shoulder in the end of September which he reports helping with pain and ROM. Patient states the injection is wearing off now and he has some increased pain. He has at times had pain 10/10 with quick overhead motions, but currently reports his pain being controlled because he knows what to do or not do with his L UE. Now, patient rates pain 0/10 at rest up to 4/10 with overhead reach. Patient presents with increased pain, decreased strength, decreased ROM, decreased flexibility, impaired posture, impaired overhead reach, decreased activity tolerance, and overall impaired functional mobility.   Present Symptoms: aching, burning, tingling   Pain Intensity 1: 3  Pain Location 1: Shoulder  Pain Orientation 1: Left  Pain Intensity 2: 6  Pain Location 2: Shoulder  Pain Orientation 2: Right  Dominant Side: left  Past Medical History: Mr. Elda Lopez  has a past medical history of Acute maxillary sinusitis; Acute pharyngitis; Aftercare for long-term (current) use of antiplatelets/antithrombotics (9/29/2016); Allergic rhinitis; Anemia (9/29/2016); Backache; CAD (coronary artery disease) (9/29/2016); [facility] ([facility] (9/29/2016); Decreased libido; Diabetes ([facility] (9/29/2016); Dizziness and giddiness; ED (erectile dysfunction) (9/29/2016); Encounter for monitoring testosterone replacement therapy (9/29/2016); Fatigue; Fever blister; GERD (gastroesophageal reflux disease); Glaucoma (9/29/2016); Hearing difficulty of right ear (9/29/2016); High cholesterol; High risk medication use (9/29/2016); Hypertension (9/29/2016); Hyponatremia (9/29/2016); IBS (irritable bowel syndrome) (9/29/2016); Intertrigo (9/29/2016); Irregular heart beat (9/29/2016); Left shoulder pain; Mouth pain; Obstructive sleep apnea of adult (9/29/2016); Osteoporosis (9/29/2016); Palpitations; Plantar wart of left foot (9/29/2016); Rotator cuff tear; Sinus bradycardia (9/29/2016); Sinusitis; Skin infection; Sore throat; Special screening for malignant neoplasm of prostate; Subclinical hyperthyroidism; and Tinea corporis. He also  has a past surgical history that includes urological.    Current Medications:   Current Outpatient Prescriptions on File Prior to Encounter   Medication Sig Dispense Refill    latanoprost (XALATAN) 0.005 % ophthalmic solution USE 1 DROP IN BOTH EYES EVERY EVENING  4    denosumab (PROLIA) 60 mg/mL injection 60 mg by SubCUTAneous route. Last dose 9/19/2016      bisoprolol (ZEBETA) 5 mg tablet Take 0.5-1 Tabs by mouth daily. Indications: HYPERTENSION 90 Tab 3    metoclopramide HCl (REGLAN) 10 mg tablet Take 1 Tab by mouth daily. Takes once a day at bedtime 90 Tab 3    rosuvastatin (CRESTOR) 40 mg tablet Take 0.5-1 Tabs by mouth nightly. Indications: HYPERLIPIDEMIA 90 Tab 3    omega 3-dha-epa-fish oil (FISH OIL) 900-1,400 mg cpDR Take 2 Caps by mouth daily for 90 days.  Last dose 01/14/10   Indications: ARTERIOSCLEROTIC VASCULAR DISEASE PREVENTION 180 Cap 3    calcium citrate-vitamin D3 (CITRACAL + D) tablet One am(with centrum) and two pm   Indications: OSTEOPOROSIS 90 Tab 3    esomeprazole (NEXIUM) 20 mg capsule Take  by mouth daily. OTC, in the AM and before dinner to lower stomach acid.  sildenafil citrate (VIAGRA) 100 mg tablet One tablet every day for ED      magnesium oxide (MAG-OX) 400 mg tablet One tablet daily to prevent or treat low magnesium      timolol (TIMOPTIC) 0.25 % ophthalmic solution Administer 1 Drop to both eyes daily.  ranitidine (ZANTAC) 300 mg tablet Take 300 mg by mouth daily. Takes at bedtime       LATANOPROST (XALATAN OP) Apply 1 Drop to eye daily. One drop to each eye       aspirin 81 mg Tab Take 81 mg by mouth daily. Last dose 01/14/10       multivitamin (ONE A DAY) tablet Take 1 Tab by mouth daily. Centrum silver last dose 01/14/10        No current facility-administered medications on file prior to encounter. Date Last Reviewed: 2/8/2017    Social History/Home Situation: Patient lives in a private residence with his wife who can help him out as needed. He reports being modified independent at home with increased time needed for ADLs. Work/Activity History: Part time . OBJECTIVE:    Outcome Measure: Tool Used: Disabilities of the Arm, Shoulder and Hand (DASH) Questionnaire - Quick Version  Score:  Initial: 22/55  Most Recent: 23/55 (Date: (11/23/16 )                          31/55 (Date: 12/22/16)                          27/55 (Date: 12/29/16)                          33/55 (Date: 1/25/17)   Interpretation of Score: The DASH is designed to measure the activities of daily living in person's with upper extremity dysfunction or pain. Each section is scored on a 1-5 scale, 5 representing the greatest disability. The scores of each section are added together for a total score of 55.     Score 11 12-19 20-28 29-37 38-45 46-54 55   Modifier CH CI CJ CK CL CM CN     ? Carrying, Moving, and Handling Objects:     - CURRENT STATUS: CK - 40%-59% impaired, limited or restricted    - GOAL STATUS:  CI - 1%-19% impaired, limited or restricted    - D/C STATUS:  ---------------To be determined---------------      Observation/Postural and Gait Assessment: In standing: patient with forward head, rounded shoulders, L shoulder elevated with increase tension in L upper trapezius, L scapula depressed and protraction. Slumped posture in sitting- able to correct with cuing, but difficulty holding posture due to decreased endurance of postural muscles. Palpation:  Moderate tenderness with moderate tightness noted in L upper trapezius. Moderate tenderness to palpation of L teres minor and pectoralis muscles. Moderate tightness and tenderness in R upper trapezius and levator scapula. Moderate to significant tenderness R teres minor. ROM:   PROM Left (degrees) Right (degrees)   Shoulder Flexion 150 (from 140) 154 (from 140)   Shoulder Abduction 135 (from 130) 130   Shoulder Internal Rotation  85 (from 80) 85   Functional Internal Rotation L1 L3 (from L5)   Shoulder External Rotation 67 (from 57) 65 (from 50)   Functional External Rotation C7 (from C5) T2     Strength: Motion Tested Left   (*/5) Right  (*/5)   Shoulder Flexion 4 (from 4-) 4- (from 4)   Scaption 4 (from 4-) 4- (from 4)   Shoulder Abduction 5 (from 4+) 4-   Shoulder Internal Rotation  4+ 4+ (from 4)   Shoulder External Rotation 2+ 3- (from 4-)   Elbow Flexion 5 5   Elbow Extension 5 5    (in lbs): arm at side with elbow flexed to 90 degrees 70 65     Special Tests:  Not formally assessed due to MRI results with massive rotator cuff tear and increased L shoulder pain with certain positions. Passive Accessory Motion Testing: Moderate (from significant) tightness with inferior and posterior glenohumeral joint mobilizations.      Neurological Screen:  Myotomes: Key muscle strength testing for bilateral UE is WNL. Dermatomes: Sensation testing through bilateral UE for light touch is intact from C3 to T2. 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 Balance: Sitting and standing balance grossly intact. TREATMENT:    (In addition to Assessment/Re-Assessment sessions the following treatments were rendered)  Therapeutic Exercise: (20 Minutes): Exercises per grid below to improve mobility, strength and coordination. Required minimal verbal cues to promote proper body alignment, promote proper body posture and promote proper body mechanics. Progressed resistance, range, repetitions and complexity of movement as indicated.    Date:  2/1/17 Date:  2/8/17 Date:  1/25/17   Activity/Exercise Parameters Parameters Parameters   Scapular retractions --- --- X 20    Wall slides ---  --- Wall slides, no lift, x 20   UBE Level 7, 2 minutes forward, 2 minutes backward Level 4, 4 minutes forward, 4 minutes backwards Level 5,  3 minutes forward, 3 minutes backwards   Finger ladder --- --- ---    Wall push ups X 30 --- X 30    Band rows Cable cord, double arm,15 lbs x 10, 12.5 lbs x 10 Blue band and green, 3 x 10 Blue and green band, 2 x 10   Band low row Blue  x 30 Blue band, 3 x 10  Blue band, 2 x 10   Shoulder flexion Active, 2 x 10 to shoulder height Active, to shoulder height, 2 sets to fatigue ---   Shoulder scaption Active, 2 x 10 to shoulder height Active, to shoulder height, 2 sets to fatigue ---   Bicep curl Cable cord, 10 lbs, x 15 each Cable cord, 10 lbs, x 15 each Blue band, bilateral, 2 x 10   Tricep extension Cable cord, 10 lbs, x 15 each Cable cord, 10 lbs, x 15 each Blue band, bilateral, 2 x 10   Band internal rotation --- --- ---   Band external rotation --- Bilateral hands close, yellow, gentle contraction x 15 Bilateral, hands close, gentle contraction, red, x 20   Wand external rotation --- --- ---   Shoulder abduction Active, x 10 each --- ---         Manual Therapy (      15 minutes): Patient received PROM in all planes to tolerance to R shoulder; Gentle distraction, oscillations on R to decrease pain, grade I-II glenohumeral joint mobilizations. Therapeutic Modalities: for pain:    Right Shoulder Heat  Type: Moist pack  Duration : 10 minutes  Patient Position: Sitting     Left Shoulder Heat  Type: Moist pack  Duration : 10 minutes  Patient Position: Sitting        Right Shoulder Cold  Type:  (vasopneumatic compression)  Duration : 15 minutes  Patient Position: Sitting     Left Shoulder Cold  Type: Cold/ice pack  Duration : 15 minutes  Patient Position: Sitting                                                                      HEP: As above; handouts given to patient for all exercises. ______________________________________________________________________________________________________    Treatment Assessment: Patient with improving mobility and tolerance for exercises. Decreased pain to 2/10 L and 4/10 R following manual therapy. Spoke with patient regarding surgical options and speaking with MD.  Progression/Medical Necessity:   · Patient is expected to demonstrate progress in strength, range of motion, coordination and functional technique to increase independence with ADLs  and improve safety during overhead reach, ADLs, and recreational activities. · Skilled intervention continues to be required due to increased L shoulder pain with overhead reach, ADLs, and recreational activities. Compliance with Program/Exercises: Compliant most of the time.    Reason for Continuation of Services/Other Comments:  · Patient continues to require skilled intervention due to increased L shoulder pain with decreased ROM hindering activity tolerance, functional mobility, and overall quality of life. Recommendations/Intent for next treatment session: \"Treatment next visit will focus on advancements to more challenging activities\". Continue manual therapy for R shoulder for pain control as tolerated. Progress exercises, postural, and functional mobility as able. Re-certification 6/43/33: continue physical therapy 1 time a week for 4 weeks for bilateral shoulders.     Total Treatment Duration: 60 minutes   PT Patient Time In/Time Out  Time In: 0800  Time Out: 0900    Trino Bunch PT

## 2017-02-15 ENCOUNTER — HOSPITAL ENCOUNTER (OUTPATIENT)
Dept: PHYSICAL THERAPY | Age: 72
Discharge: HOME | End: 2017-02-15
Attending: ORTHOPAEDIC SURGERY
Payer: MEDICARE

## 2017-02-15 PROCEDURE — 97016 VASOPNEUMATIC DEVICE THERAPY: CPT

## 2017-02-15 PROCEDURE — 97110 THERAPEUTIC EXERCISES: CPT

## 2017-02-15 PROCEDURE — 97140 MANUAL THERAPY 1/> REGIONS: CPT

## 2017-02-15 NOTE — PROGRESS NOTES
Aletha Silva   [phone]) [phone]  at  [facility]  [address] [address], [place], [address] [place] [address]  [phone]   [phone]         Outpatient PHYSICAL THERAPY: Daily Note  Fall Risk Score: 2 (? 5 = High Risk)    ICD-10: Treatment Diagnosis: complete rotator cuff tear or rupture of left shoulder, not specified as traumatic (M75.122)  ICD-10: Treatment Diagnosis 2: pain in left shoulder (M25.512)  ICD-10: Treatment Diagnosis 3: complete rotator cuff tear or rupture of right shoulder, not specified as traumatic (M75.121)  ICD-10: Treatment Diagnosis 4: pain in right shoulder (M25.511)  DATE: 2/15/2017  REFERRING PHYSICIAN: Saniya Atkinson MD MD Orders: Evaluate and treat  Return Physician Appointment: 2017  MEDICAL/REFERRING DIAGNOSIS: Complete rotator cuff tear or rupture of left shoulder, not specified as traumatic [M75.122]  DATE OF ONSET: 2016   PRIOR LEVEL OF FUNCTION: independent  PRECAUTIONS: full massive rotator cuff tear- supraspinatus/ infraspinatus  ALLERGIES: No Known Allergies Patient denies allergy to latex, adhesives or creams. ASSESSMENT:  ?????? ? ? This section established at most recent assessment?????????? Patient is a 80year old male presenting to physical therapy with complaints of L shoulder pain and decreased functional mobility. Patient had MRI performed on 16 which indicated massive full thickness tear of supraspinatus and infraspinatus with 5 cm retraction, moderate degeneration and hypertrophy of AC joint and high riding humeral head- per chart review. Patient reports no incident of trauma or injury. He received an injection in his L shoulder in the end of September which he reports helping with pain and ROM. Patient states the injection is wearing off now and he has some increased pain.  He has at times had pain 10/10 with quick overhead motions, but currently reports his pain being controlled because he knows what to do or not do with his L UE. Now, patient rates pain 0/10 at rest up to 4/10 with overhead reach. Patient presents with increased pain, decreased strength, decreased ROM, decreased flexibility, impaired posture, impaired overhead reach, decreased activity tolerance, and overall impaired functional mobility. Patient is a good candidate for skilled physical therapy interventions to include manual therapy, therapeutic exercise, postural re-education, body mechanics training, and pain modalities as needed. Patient has been seen for 24 sessions of physical therapy from 10/28/16 to 1/25/17. He reports his L shoulder feeling much better since starting therapy with improvements in mobility, activity tolerance, and pain. His R shoulder has recently started to improve with some strength and function. Patient is motivated and using bilateral UEs as able at home and reports no interest in surgery at this time. Patient with improvements in strength, ROM, overall mobility, and activity tolerance. He has met many goals since starting therapy and is progressing with the rest. Patient should benefit from continued skilled therapy to address remaining strength and ROM deficits for bilateral shoulders. PROBLEM LIST (Impairments causing functional limitations):  1. Decreased Strength affecting function  2. Decreased ADL/Functional Activities  3. Decreased Flexibility/joint mobility  4. Decreased transfer abilities  5. Increased Pain affecting function  6. Decreased Activity tolerance   7. Decreased Pacing skills  8. Decreased Work Simplification/Energy Conservation techniques  GOALS: (Goals have been discussed and agreed upon with patient.)  SHORT-TERM FUNCTIONAL GOALS: Time Frame: 10/28/16 to 11/11/16   1. Patient will be independent with HEP to improve upright posture, overhead reach, and L UE ROM. -GOAL MET  2. Patient will report no more than 0/10 L shoulder pain at rest in order to demonstrate improved self pain control and tolerance. -GOAL MET 3. Patient will improve upright posture including decreased forward head and rounded shoulders with improved L shoulder position in order to improve overhead reach. -GOAL MET  DISCHARGE GOALS: Time Frame: 1/25/17 to 2/24/17  1. Patient will improve gross overhead active ROM to 150 degrees in order to improve functional mobility and independence with ADLs. -GOAL MET  2. Patient will report no more than 4/10 L shoulder pain with overhead motions in order to demonstrate improved activity tolerance. -GOAL MET   3. Patient will report minimal to no pain with driving in order to demonstrate improve functional mobility. -ONGOING (L shoulder minimal pain, R with moderate pain- 1/25/17)  4. 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 -ONGOING (reports moderate R shoulder pain 1/25/17)  5. Patient will improve Disability of the Arm, Shoulder, and Hand score to 18/55 from 22/55. -ONGOING (scored 33/55 on 1/25/17)  6. NEW GOAL 12/29/16: Patient will report no more than 7/10 R shoulder pain with overhead motions in order to demonstrate improved activity tolerance. -ONGOING (reports 9/10 on 1/25/17)  7. NEW GOAL 12/29/16: Patient will report overall decreased R shoulder pain with improve mobility in order to return to prior functional level. -ONGOING  REHABILITATION POTENTIAL FOR STATED GOALS: [place] OF CARE:  INTERVENTIONS PLANNED: (Benefits and precautions of physical therapy have been discussed with the patient.)  1. cold  2. heat  3. home exercise program (HEP)  4. manual therapy  5. neuromuscular re-education/strengthening  6. range of motion: active/assisted/passive  7. therapeutic activities  8. therapeutic exercise/strengthening  9. ultrasound  TREATMENT PLAN EFFECTIVE DATES: 1/25/17 to 2/24/17  FREQUENCY/DURATION: Follow patient 1 times a week for 4 weeks to address above goals.    Regarding Marva Lorenz's therapy, I certify that the treatment plan above will be carried out by a therapist or under their direction. Thank you for this referral,  Alex Henson, PT     Referring Physician Signature: Javy Borrego MD          Date                       SUBJECTIVE:  \"I can tell I'm doing better overall, but I think I am just going to have to live with the right side hurting more. \"     Naty Zambrano from Dr. Alecia Cheadle: add in Right shoulder physical therapy 2 times a week for 4 weeks (12/21/16)    History of Present Injury/Illness (Reason for Referral): Patient is a 80year old male presenting to physical therapy with complaints of L shoulder pain and decreased functional mobility. Patient had MRI performed on 9/2/16 which indicated massive full thickness tear of supraspinatus and infraspinatus with 5 cm retraction, moderate degeneration and hypertrophy of AC joint and high riding humeral head- per chart review. Patient reports no incident of trauma or injury. He received an injection in his L shoulder in the end of September which he reports helping with pain and ROM. Patient states the injection is wearing off now and he has some increased pain. He has at times had pain 10/10 with quick overhead motions, but currently reports his pain being controlled because he knows what to do or not do with his L UE. Now, patient rates pain 0/10 at rest up to 4/10 with overhead reach. Patient presents with increased pain, decreased strength, decreased ROM, decreased flexibility, impaired posture, impaired overhead reach, decreased activity tolerance, and overall impaired functional mobility. Present Symptoms: aching, burning, tingling   Pain Intensity 1: 3  Pain Location 1: Shoulder  Pain Orientation 1: Left  Pain Intensity 2: 5  Pain Location 2: Shoulder  Pain Orientation 2: Right  Dominant Side: left  Past Medical History: Mr. Mary Beth Cerna  has a past medical history of Acute maxillary sinusitis; Acute pharyngitis;  Aftercare for long-term (current) use of antiplatelets/antithrombotics (9/29/2016); Allergic rhinitis; Anemia (9/29/2016); Backache; CAD (coronary artery disease) (9/29/2016); Carotid artery disease ([facility] (9/29/2016); Decreased libido; Diabetes ([facility] (9/29/2016); Dizziness and giddiness; ED (erectile dysfunction) (9/29/2016); Encounter for monitoring testosterone replacement therapy (9/29/2016); Fatigue; Fever blister; GERD (gastroesophageal reflux disease); Glaucoma (9/29/2016); Hearing difficulty of right ear (9/29/2016); High cholesterol; High risk medication use (9/29/2016); Hypertension (9/29/2016); Hyponatremia (9/29/2016); IBS (irritable bowel syndrome) (9/29/2016); Intertrigo (9/29/2016); Irregular heart beat (9/29/2016); Left shoulder pain; Mouth pain; Obstructive sleep apnea of adult (9/29/2016); Osteoporosis (9/29/2016); Palpitations; Plantar wart of left foot (9/29/2016); Rotator cuff tear; Sinus bradycardia (9/29/2016); Sinusitis; Skin infection; Sore throat; Special screening for malignant neoplasm of prostate; Subclinical hyperthyroidism; and Tinea corporis. He also  has a past surgical history that includes urological.    Current Medications:   Current Outpatient Prescriptions on File Prior to Encounter   Medication Sig Dispense Refill    latanoprost (XALATAN) 0.005 % ophthalmic solution USE 1 DROP IN BOTH EYES EVERY EVENING  4    denosumab (PROLIA) 60 mg/mL injection 60 mg by SubCUTAneous route. Last dose 9/19/2016      bisoprolol (ZEBETA) 5 mg tablet Take 0.5-1 Tabs by mouth daily. Indications: HYPERTENSION 90 Tab 3    metoclopramide HCl (REGLAN) 10 mg tablet Take 1 Tab by mouth daily. Takes once a day at bedtime 90 Tab 3    rosuvastatin (CRESTOR) 40 mg tablet Take 0.5-1 Tabs by mouth nightly. Indications: HYPERLIPIDEMIA 90 Tab 3    omega 3-dha-epa-fish oil (FISH OIL) 900-1,400 mg cpDR Take 2 Caps by mouth daily for 90 days.  Last dose 01/14/10   Indications: ARTERIOSCLEROTIC VASCULAR DISEASE PREVENTION 180 Cap 3    calcium citrate-vitamin D3 (CITRACAL + D) tablet One am(with centrum) and two pm   Indications: OSTEOPOROSIS 90 Tab 3    esomeprazole (NEXIUM) 20 mg capsule Take  by mouth daily. OTC, in the AM and before dinner to lower stomach acid.  sildenafil citrate (VIAGRA) 100 mg tablet One tablet every day for ED      magnesium oxide (MAG-OX) 400 mg tablet One tablet daily to prevent or treat low magnesium      timolol (TIMOPTIC) 0.25 % ophthalmic solution Administer 1 Drop to both eyes daily.  ranitidine (ZANTAC) 300 mg tablet Take 300 mg by mouth daily. Takes at bedtime       LATANOPROST (XALATAN OP) Apply 1 Drop to eye daily. One drop to each eye       aspirin 81 mg Tab Take 81 mg by mouth daily. Last dose 01/14/10       multivitamin (ONE A DAY) tablet Take 1 Tab by mouth daily. Centrum silver last dose 01/14/10        No current facility-administered medications on file prior to encounter. Date Last Reviewed: 2/15/2017    Social History/Home Situation: Patient lives in a private residence with his wife who can help him out as needed. He reports being modified independent at home with increased time needed for ADLs. Work/Activity History: Part time . OBJECTIVE:    Outcome Measure: Tool Used: Disabilities of the Arm, Shoulder and Hand (DASH) Questionnaire - Quick Version  Score:  Initial: 22/55  Most Recent: 23/55 (Date: (11/23/16 )                          31/55 (Date: 12/22/16)                          27/55 (Date: 12/29/16)                          33/55 (Date: 1/25/17)   Interpretation of Score: The DASH is designed to measure the activities of daily living in person's with upper extremity dysfunction or pain. Each section is scored on a 1-5 scale, 5 representing the greatest disability. The scores of each section are added together for a total score of 55. Score 11 12-19 20-28 29-37 38-45 46-54 55   Modifier CH CI CJ CK CL CM CN     ?  Carrying, Moving, and Handling Objects:     - CURRENT STATUS: CK - 40%-59% impaired, limited or restricted    - GOAL STATUS:  CI - 1%-19% impaired, limited or restricted    - D/C STATUS:  ---------------To be determined---------------      Observation/Postural and Gait Assessment: In standing: patient with forward head, rounded shoulders, L shoulder elevated with increase tension in L upper trapezius, L scapula depressed and protraction. Slumped posture in sitting- able to correct with cuing, but difficulty holding posture due to decreased endurance of postural muscles. Palpation:  Moderate tenderness with moderate tightness noted in L upper trapezius. Moderate tenderness to palpation of L teres minor and pectoralis muscles. Moderate tightness and tenderness in R upper trapezius and levator scapula. Moderate to significant tenderness R teres minor. ROM:   PROM Left (degrees) Right (degrees)   Shoulder Flexion 150 (from 140) 154 (from 140)   Shoulder Abduction 135 (from 130) 130   Shoulder Internal Rotation  85 (from 80) 85   Functional Internal Rotation L1 L3 (from L5)   Shoulder External Rotation 67 (from 57) 65 (from 50)   Functional External Rotation C7 (from C5) T2     Strength: Motion Tested Left   (*/5) Right  (*/5)   Shoulder Flexion 4 (from 4-) 4- (from 4)   Scaption 4 (from 4-) 4- (from 4)   Shoulder Abduction 5 (from 4+) 4-   Shoulder Internal Rotation  4+ 4+ (from 4)   Shoulder External Rotation 2+ 3- (from 4-)   Elbow Flexion 5 5   Elbow Extension 5 5    (in lbs): arm at side with elbow flexed to 90 degrees 70 65     Special Tests:  Not formally assessed due to MRI results with massive rotator cuff tear and increased L shoulder pain with certain positions. Passive Accessory Motion Testing: Moderate (from significant) tightness with inferior and posterior glenohumeral joint mobilizations. Neurological Screen:  Myotomes: Key muscle strength testing for bilateral UE is WNL.   Dermatomes: Sensation testing through bilateral UE for light touch is intact from C3 to T2. 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 Balance: Sitting and standing balance grossly intact. TREATMENT:    (In addition to Assessment/Re-Assessment sessions the following treatments were rendered)  Therapeutic Exercise: (25 Minutes): Exercises per grid below to improve mobility, strength and coordination. Required minimal verbal cues to promote proper body alignment, promote proper body posture and promote proper body mechanics. Progressed resistance, range, repetitions and complexity of movement as indicated.    Date:  2/1/17 Date:  2/8/17 Date:  2/15/17   Activity/Exercise Parameters Parameters Parameters   UBE Level 7, 2 minutes forward, 2 minutes backward Level 4, 4 minutes forward, 4 minutes backwards Level 5,  3 minutes forward, 3 minutes backwards   Wall push ups X 30 --- ---    Band rows Cable cord, double arm,15 lbs x 10, 12.5 lbs x 10 Blue band and green, 3 x 10 Cable cord, 15 lbs, double arm, 2 x 10   Band low row Blue  x 30 Blue band, 3 x 10  ---   Shoulder flexion Active, 2 x 10 to shoulder height Active, to shoulder height, 2 sets to fatigue Active, to shoulder height, 2 sets to fatigue   Shoulder scaption Active, 2 x 10 to shoulder height Active, to shoulder height, 2 sets to fatigue Active, to shoulder height, 2 sets to fatigue   Bicep curl Cable cord, 10 lbs, x 15 each Cable cord, 10 lbs, x 15 each Cable cord, 10 lbs, 2 x 10 each   Tricep extension Cable cord, 10 lbs, x 15 each Cable cord, 10 lbs, x 15 each Cable cord, 10 lbs, 2 x 10 each   Band internal rotation --- --- ---   Band external rotation --- Bilateral hands close, yellow, gentle contraction x 15 ---   Wand external rotation --- --- ---   Shoulder abduction Active, x 10 each --- ---         Manual Therapy (      15 minutes): Patient received PROM in all planes to tolerance to R shoulder; Gentle distraction, oscillations on R to decrease pain, grade I-II glenohumeral joint mobilizations. Therapeutic Modalities: for pain:    Right Shoulder Heat  Type: Moist pack  Duration : 10 minutes  Patient Position: Sitting     Left Shoulder Heat  Type: Moist pack  Duration : 10 minutes  Patient Position: Sitting        Right Shoulder Cold  Type:  (vasopneumatic compression)  Duration : 10 minutes  Patient Position: Sitting     Left Shoulder Cold  Type: Cold/ice pack  Duration : 10 minutes  Patient Position: Sitting                                                                      HEP: As above; handouts given to patient for all exercises. ______________________________________________________________________________________________________    Treatment Assessment: Patient with improved mobility and overall pain in both shoulders since starting therapy. His right shoulder continues to bother him more than his left, but he reports some improvements overall. Reported decreased to 4/10 in R shoulder and L stayed 3/10. Progression/Medical Necessity:   · Patient is expected to demonstrate progress in strength, range of motion, coordination and functional technique to increase independence with ADLs  and improve safety during overhead reach, ADLs, and recreational activities. · Skilled intervention continues to be required due to increased L shoulder pain with overhead reach, ADLs, and recreational activities. Compliance with Program/Exercises: Compliant most of the time.    Reason for Continuation of Services/Other Comments:  · Patient continues to require skilled intervention due to increased L shoulder pain with decreased ROM hindering activity tolerance, functional mobility, and overall quality of life. Recommendations/Intent for next treatment session: \"Treatment next visit will focus on advancements to more challenging activities\". Continue manual therapy for R shoulder for pain control as tolerated. Progress exercises, postural, and functional mobility as able. Re-certification 5/20/50: continue physical therapy 1 time a week for 4 weeks for bilateral shoulders.     Total Treatment Duration: 60 minutes   PT Patient Time In/Time Out  Time In: 0800  Time Out: 0900    Michelle Coulter PT

## 2017-02-22 ENCOUNTER — HOSPITAL ENCOUNTER (OUTPATIENT)
Dept: PHYSICAL THERAPY | Age: 72
Discharge: HOME | End: 2017-02-22
Attending: ORTHOPAEDIC SURGERY
Payer: MEDICARE

## 2017-02-22 PROCEDURE — 97016 VASOPNEUMATIC DEVICE THERAPY: CPT

## 2017-02-22 PROCEDURE — G8985 CARRY GOAL STATUS: HCPCS

## 2017-02-22 PROCEDURE — 97110 THERAPEUTIC EXERCISES: CPT

## 2017-02-22 PROCEDURE — G8986 CARRY D/C STATUS: HCPCS

## 2017-02-22 NOTE — PROGRESS NOTES
Geeta Licona   [phone]) [phone] at  [facility]  [address] [place] [address], [facility], [address] [place] [address]  [phone]   [phone]         Outpatient PHYSICAL THERAPY: Daily Note and Discharge  Fall Risk Score: 2 (? 5 = High Risk)    ICD-10: Treatment Diagnosis: complete rotator cuff tear or rupture of left shoulder, not specified as traumatic (M75.122)  ICD-10: Treatment Diagnosis 2: pain in left shoulder (M25.512)  ICD-10: Treatment Diagnosis 3: complete rotator cuff tear or rupture of right shoulder, not specified as traumatic (M75.121)  ICD-10: Treatment Diagnosis 4: pain in right shoulder (M25.511)  DATE: 2017  REFERRING PHYSICIAN: Greg Woods MD MD Orders: Evaluate and treat  Return Physician Appointment: 2017  MEDICAL/REFERRING DIAGNOSIS: Complete rotator cuff tear or rupture of left shoulder, not specified as traumatic [M75.122]  DATE OF ONSET: 2016   PRIOR LEVEL OF FUNCTION: independent  PRECAUTIONS: full massive rotator cuff tear- supraspinatus/ infraspinatus  ALLERGIES: No Known Allergies Patient denies allergy to latex, adhesives or creams. ASSESSMENT:  ?????? ? ? This section established at most recent assessment?????????? Patient is a 80year old male presenting to physical therapy with complaints of L shoulder pain and decreased functional mobility. Patient had MRI performed on 16 which indicated massive full thickness tear of supraspinatus and infraspinatus with 5 cm retraction, moderate degeneration and hypertrophy of AC joint and high riding humeral head- per chart review. Patient reports no incident of trauma or injury. He received an injection in his L shoulder in the end of September which he reports helping with pain and ROM. Patient states the injection is wearing off now and he has some increased pain.  He has at times had pain 10/10 with quick overhead motions, but currently reports his pain being controlled because he knows what to do or not do with his L UE. Now, patient rates pain 0/10 at rest up to 4/10 with overhead reach. Patient presents with increased pain, decreased strength, decreased ROM, decreased flexibility, impaired posture, impaired overhead reach, decreased activity tolerance, and overall impaired functional mobility. Patient is a good candidate for skilled physical therapy interventions to include manual therapy, therapeutic exercise, postural re-education, body mechanics training, and pain modalities as needed. Patient has been seen for 28 sessions of physical therapy from 10/28/16 to 2/22/17. He reports both shoulders feeling as though they are doing better. His R shoulder continues to give him more pain than his L, but is slowly improving. Patient is motivated and using bilateral UEs as able at home and reports no interest in surgery at this time. He reports difficulties with overhead motions, but being able to avoid those activities at work. Patient with improvements in strength, ROM, overall mobility, and activity tolerance. Patient has met many goals, is independent with HEP, and is safe for discharge at this time. PROBLEM LIST (Impairments causing functional limitations):  1. Decreased Strength affecting function  2. Decreased ADL/Functional Activities  3. Decreased Flexibility/joint mobility  4. Decreased transfer abilities  5. Increased Pain affecting function  6. Decreased Activity tolerance   7. Decreased Pacing skills  8. Decreased Work Simplification/Energy Conservation techniques  GOALS: (Goals have been discussed and agreed upon with patient.)  SHORT-TERM FUNCTIONAL GOALS: Time Frame: 10/28/16 to 11/11/16   1. Patient will be independent with HEP to improve upright posture, overhead reach, and L UE ROM. -GOAL MET  2. Patient will report no more than 0/10 L shoulder pain at rest in order to demonstrate improved self pain control and tolerance. -GOAL MET   3.  Patient will improve upright posture including decreased forward head and rounded shoulders with improved L shoulder position in order to improve overhead reach. -GOAL MET  DISCHARGE GOALS: Time Frame: 1/25/17 to 2/24/17  1. Patient will improve gross overhead active ROM to 150 degrees in order to improve functional mobility and independence with ADLs. -GOAL MET  2. Patient will report no more than 4/10 L shoulder pain with overhead motions in order to demonstrate improved activity tolerance. -GOAL MET   3. Patient will report minimal to no pain with driving in order to demonstrate improve functional mobility. -GOAL NOT MET (L shoulder minimal pain, R with moderate pain- 2/22/17, improving)  4. 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 -GOAL MET  5. Patient will improve Disability of the Arm, Shoulder, and Hand score to 18/55 from 22/55. -GOAL NOT MET (scored 33/55 on 1/25/17)  6. NEW GOAL 12/29/16: Patient will report no more than 7/10 R shoulder pain with overhead motions in order to demonstrate improved activity tolerance. -GOAL MET  7. NEW GOAL 12/29/16: Patient will report overall decreased R shoulder pain with improve mobility in order to return to prior functional level. -GOAL MET  REHABILITATION POTENTIAL FOR STATED GOALS: Gil Huang OF CARE:  INTERVENTIONS PLANNED: (Benefits and precautions of physical therapy have been discussed with the patient.)  1. cold  2. heat  3. home exercise program (HEP)  4. manual therapy  5. neuromuscular re-education/strengthening  6. range of motion: active/assisted/passive  7. therapeutic activities  8. therapeutic exercise/strengthening  9. ultrasound  TREATMENT PLAN EFFECTIVE DATES: 1/25/17 to 2/24/17  FREQUENCY/DURATION: Patient has been seen for 28 sessions of physical therapy from 10/28/16 to 2/22/17. He reports both shoulders feeling as though they are doing better.  His R shoulder continues to give him more pain than his L, but is slowly improving. Patient is motivated and using bilateral UEs as able at home and reports no interest in surgery at this time. He reports difficulties with overhead motions, but being able to avoid those activities at work. Patient with improvements in strength, ROM, overall mobility, and activity tolerance. Patient has met many goals, is independent with HEP, and is safe for discharge at this time. Regarding Vimal Lorenz's therapy, I certify that the treatment plan above will be carried out by a therapist or under their direction. Thank you for this referral,  Jordy Smith PT     Referring Physician Signature: Hilda Sherman MD          Date                       SUBJECTIVE:  \"Both shoulders are doing better and the right one is still bothering me more. I can tell a difference and I am doing my exercises at home. \"    History of Present Injury/Illness (Reason for Referral): Patient is a 80year old male presenting to physical therapy with complaints of L shoulder pain and decreased functional mobility. Patient had MRI performed on 9/2/16 which indicated massive full thickness tear of supraspinatus and infraspinatus with 5 cm retraction, moderate degeneration and hypertrophy of AC joint and high riding humeral head- per chart review. Patient reports no incident of trauma or injury. He received an injection in his L shoulder in the end of September which he reports helping with pain and ROM. Patient states the injection is wearing off now and he has some increased pain. He has at times had pain 10/10 with quick overhead motions, but currently reports his pain being controlled because he knows what to do or not do with his L UE. Now, patient rates pain 0/10 at rest up to 4/10 with overhead reach.  Patient presents with increased pain, decreased strength, decreased ROM, decreased flexibility, impaired posture, impaired overhead reach, decreased activity tolerance, and overall impaired functional mobility. Present Symptoms: aching, burning, tingling   Pain Intensity 1: 3  Pain Location 1: Shoulder  Pain Orientation 1: Left  Pain Intensity 2: 5  Pain Location 2: Shoulder  Pain Orientation 2: Right  Dominant Side: left  Past Medical History: Mr. Suzette Higgins  has a past medical history of Acute maxillary sinusitis; Acute pharyngitis; Aftercare for long-term (current) use of antiplatelets/antithrombotics (9/29/2016); Allergic rhinitis; Anemia (9/29/2016); Backache; CAD (coronary artery disease) (9/29/2016); [facility] (9/29/2016); Decreased libido; Diabetes ([facility] (9/29/2016); Dizziness and giddiness; ED (erectile dysfunction) (9/29/2016); Encounter for monitoring testosterone replacement therapy (9/29/2016); Fatigue; Fever blister; GERD (gastroesophageal reflux disease); Glaucoma (9/29/2016); Hearing difficulty of right ear (9/29/2016); High cholesterol; High risk medication use (9/29/2016); Hypertension (9/29/2016); Hyponatremia (9/29/2016); IBS (irritable bowel syndrome) (9/29/2016); Intertrigo (9/29/2016); Irregular heart beat (9/29/2016); Left shoulder pain; Mouth pain; Obstructive sleep apnea of adult (9/29/2016); Osteoporosis (9/29/2016); Palpitations; Plantar wart of left foot (9/29/2016); Rotator cuff tear; Sinus bradycardia (9/29/2016); Sinusitis; Skin infection; Sore throat; Special screening for malignant neoplasm of prostate; Subclinical hyperthyroidism; and Tinea corporis. He also  has a past surgical history that includes urological.    Current Medications:   Current Outpatient Prescriptions on File Prior to Encounter   Medication Sig Dispense Refill    latanoprost (XALATAN) 0.005 % ophthalmic solution USE 1 DROP IN BOTH EYES EVERY EVENING  4    denosumab (PROLIA) 60 mg/mL injection 60 mg by SubCUTAneous route. Last dose 9/19/2016      bisoprolol (ZEBETA) 5 mg tablet Take 0.5-1 Tabs by mouth daily.  Indications: HYPERTENSION 90 Tab 3    metoclopramide HCl (REGLAN) 10 mg tablet Take 1 Tab by mouth daily. Takes once a day at bedtime 90 Tab 3    rosuvastatin (CRESTOR) 40 mg tablet Take 0.5-1 Tabs by mouth nightly. Indications: HYPERLIPIDEMIA 90 Tab 3    omega 3-dha-epa-fish oil (FISH OIL) 900-1,400 mg cpDR Take 2 Caps by mouth daily for 90 days. Last dose 01/14/10   Indications: ARTERIOSCLEROTIC VASCULAR DISEASE PREVENTION 180 Cap 3    calcium citrate-vitamin D3 (CITRACAL + D) tablet One am(with centrum) and two pm   Indications: OSTEOPOROSIS 90 Tab 3    esomeprazole (NEXIUM) 20 mg capsule Take  by mouth daily. OTC, in the AM and before dinner to lower stomach acid.  sildenafil citrate (VIAGRA) 100 mg tablet One tablet every day for ED      magnesium oxide (MAG-OX) 400 mg tablet One tablet daily to prevent or treat low magnesium      timolol (TIMOPTIC) 0.25 % ophthalmic solution Administer 1 Drop to both eyes daily.  ranitidine (ZANTAC) 300 mg tablet Take 300 mg by mouth daily. Takes at bedtime       LATANOPROST (XALATAN OP) Apply 1 Drop to eye daily. One drop to each eye       aspirin 81 mg Tab Take 81 mg by mouth daily. Last dose 01/14/10       multivitamin (ONE A DAY) tablet Take 1 Tab by mouth daily. Centrum silver last dose 01/14/10        No current facility-administered medications on file prior to encounter. Date Last Reviewed: 2/22/2017    Social History/Home Situation: Patient lives in a private residence with his wife who can help him out as needed. He reports being modified independent at home with increased time needed for ADLs. Work/Activity History: Part time . OBJECTIVE:    Outcome Measure:    Tool Used: Disabilities of the Arm, Shoulder and Hand (DASH) Questionnaire - Quick Version  Score:  Initial: 22/55  Most Recent: 23/55 (Date: (11/23/16 )                          31/55 (Date: 12/22/16)                          27/55 (Date: 12/29/16)                          33/55 (Date: 1/25/17)                          27/55 (Date: 2/22/17) Interpretation of Score: The DASH is designed to measure the activities of daily living in person's with upper extremity dysfunction or pain. Each section is scored on a 1-5 scale, 5 representing the greatest disability. The scores of each section are added together for a total score of 55. Score 11 12-19 20-28 29-37 38-45 46-54 55   Modifier CH CI CJ CK CL CM CN     ? Carrying, Moving, and Handling Objects:     - GOAL STATUS:  CI - 1%-19% impaired, limited or restricted    - D/C STATUS:  CJ - 20%-39% impaired, limited or restricted      Observation/Postural and Gait Assessment: In standing: patient with forward head, rounded shoulders, L shoulder elevated with increase tension in L upper trapezius, L scapula depressed and protraction. Slumped posture in sitting- able to correct with cuing, but difficulty holding posture due to decreased endurance of postural muscles. Palpation:  Minimal (from moderate) tenderness with minimal (from moderate) tightness noted in L upper trapezius. Moderate tenderness to palpation of L teres minor and pectoralis muscles. Moderate tightness and tenderness in R upper trapezius and levator scapula. Moderate (from moderate to significant) tenderness R teres minor. ROM:   PROM Left (degrees) Right (degrees)   Shoulder Flexion 150 (from 140) 154 (from 140)   Shoulder Abduction 135 (from 130) 130   Shoulder Internal Rotation  85 (from 80) 85   Functional Internal Rotation L1 L2 (from L5)   Shoulder External Rotation 67 (from 57) 65 (from 50)   Functional External Rotation T2 (from C5) T2     Strength:    Motion Tested Left   (*/5) Right  (*/5)   Shoulder Flexion 4 (from 4-) 4- (from 4)   Scaption 4+ (from 4-) 4 (from 4)   Shoulder Abduction 5 (from 4+) 4 (from 4-)   Shoulder Internal Rotation  4+ 4+ (from 4)   Shoulder External Rotation 2+ 3- (from 4-)   Elbow Flexion 5 5   Elbow Extension 5 5    (in lbs): arm at side with elbow flexed to 90 degrees 70 65 Special Tests:  Not formally assessed due to MRI results with massive rotator cuff tear and increased L shoulder pain with certain positions. Passive Accessory Motion Testing: Moderate (from significant) tightness with inferior and posterior glenohumeral joint mobilizations. Neurological Screen:  Myotomes: Key muscle strength testing for bilateral UE is WNL. Dermatomes: Sensation testing through bilateral UE for light touch is intact from C3 to T2. 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 Balance: Sitting and standing balance grossly intact. TREATMENT:    (In addition to Assessment/Re-Assessment sessions the following treatments were rendered)  Therapeutic Exercise: (25 Minutes): Exercises per grid below to improve mobility, strength and coordination. Required minimal verbal cues to promote proper body alignment, promote proper body posture and promote proper body mechanics. Progressed resistance, range, repetitions and complexity of movement as indicated.    Date:  2/22/17 Date:  2/8/17 Date:  2/15/17   Activity/Exercise Parameters Parameters Parameters   UBE --- Level 4, 4 minutes forward, 4 minutes backwards Level 5,  3 minutes forward, 3 minutes backwards   Wall push ups X 30 --- ---    Band rows Blue, 3 x 10 Blue band and green, 3 x 10 Cable cord, 15 lbs, double arm, 2 x 10   Band low row Green, 3 x 10 Blue band, 3 x 10  ---   Shoulder flexion Active, 2 x 10 to shoulder height Active, to shoulder height, 2 sets to fatigue Active, to shoulder height, 2 sets to fatigue   Shoulder scaption Active, 2 x 10 to shoulder height Active, to shoulder height, 2 sets to fatigue Active, to shoulder height, 2 sets to fatigue   Bicep curl 10 lb dumbbell,  X 10 each Cable cord, 10 lbs, x 15 each Cable cord, 10 lbs, 2 x 10 each   Tricep extension Blue band, 2 x 10 Cable cord, 10 lbs, x 15 each Cable cord, 10 lbs, 2 x 10 each   Band internal rotation --- --- ---   Band external rotation Yellow, bilateral, x 20 Bilateral hands close, yellow, gentle contraction x 15 ---         Manual Therapy (      5 minutes): manual strength and ROM measurements     Therapeutic Modalities: for pain:                   Right Shoulder Cold  Type:  (vasopneumatic compression)  Duration : 15 minutes  Patient Position: Sitting     Left Shoulder Cold  Type: Cold/ice pack  Duration : 15 minutes  Patient Position: Sitting                                                                      HEP: As above; handouts given to patient for all exercises. ______________________________________________________________________________________________________    Treatment Assessment: Patient with good understanding of HEP. Minimal complaints of pain with activities. He is safe for discharge at this time. Reported 3/10 L shoulder and 5/10 R shoulder pain. Progression/Medical Necessity:   · Patient has been seen for 28 sessions of physical therapy from 10/28/16 to 2/22/17. He reports both shoulders feeling as though they are doing better. His R shoulder continues to give him more pain than his L, but is slowly improving. Patient is motivated and using bilateral UEs as able at home and reports no interest in surgery at this time. He reports difficulties with overhead motions, but being able to avoid those activities at work. Patient with improvements in strength, ROM, overall mobility, and activity tolerance. Patient has met many goals, is independent with HEP, and is safe for discharge at this time. Compliance with Program/Exercises: Compliant most of the time.    Recommendations/Intent for next treatment session: Patient discharged to home program at this time.     Total Treatment Duration: 55 minutes   PT Patient Time In/Time Out  Time In: 0800  Time Out: [id number]    Chelo Villarreal, PT

## 2017-03-20 ENCOUNTER — APPOINTMENT (OUTPATIENT)
Dept: INFUSION THERAPY | Age: 72
End: 2017-03-20

## 2017-03-27 ENCOUNTER — HOSPITAL ENCOUNTER (OUTPATIENT)
Dept: INFUSION THERAPY | Age: 72
Discharge: HOME | End: 2017-03-27
Payer: MEDICARE

## 2017-03-27 LAB
CALCIUM SERPL-MCNC: 9.5 MG/DL (ref 8.3–10.4)
CREAT SERPL-MCNC: 1.1 MG/DL (ref 0.8–1.5)

## 2017-03-27 PROCEDURE — 82565 ASSAY OF CREATININE: CPT | Performed by: FAMILY MEDICINE

## 2017-03-27 PROCEDURE — 96372 THER/PROPH/DIAG INJ SC/IM: CPT

## 2017-03-27 PROCEDURE — 74011250636 HC RX REV CODE- 250/636: Performed by: FAMILY MEDICINE

## 2017-03-27 PROCEDURE — 36415 COLL VENOUS BLD VENIPUNCTURE: CPT | Performed by: FAMILY MEDICINE

## 2017-03-27 PROCEDURE — 82310 ASSAY OF CALCIUM: CPT | Performed by: FAMILY MEDICINE

## 2017-03-27 RX ADMIN — DENOSUMAB 60 MG: 60 INJECTION SUBCUTANEOUS at 08:20

## 2017-03-27 NOTE — PROGRESS NOTES
[facility] completed. Patient tolerated prolia injection well today. Any issues or concerns during appointment: none. Patient aware of next infusion appointment on 10/2/17 (date) at 0800 (time) with IV infusion center. Discharged ambulatory, per self. Patient instructed to call his doctor's office immediately for any problems or concerns. He verbalizes understanding.

## 2017-03-27 NOTE — PROGRESS NOTES
Problem: Knowledge Deficit  Goal: *Verbalizes understanding of procedures and medications  Outcome: Progressing Towards Goal  Patient here for for prolia injection today. He states that he has received it in the past.  Reviewed the process and procedure with patient and her verbalizes understanding.

## 2017-03-28 VITALS
SYSTOLIC BLOOD PRESSURE: 144 MMHG | TEMPERATURE: 97.7 F | DIASTOLIC BLOOD PRESSURE: 76 MMHG | RESPIRATION RATE: 16 BRPM | OXYGEN SATURATION: 97 % | HEART RATE: 58 BPM

## 2017-10-02 ENCOUNTER — APPOINTMENT (OUTPATIENT)
Dept: INFUSION THERAPY | Age: 72
End: 2017-10-02

## 2017-10-09 ENCOUNTER — HOSPITAL ENCOUNTER (OUTPATIENT)
Dept: INFUSION THERAPY | Age: 72
Discharge: HOME | End: 2017-10-09
Payer: MEDICARE

## 2017-10-09 VITALS
RESPIRATION RATE: 18 BRPM | TEMPERATURE: 97.5 F | BODY MASS INDEX: 30.54 KG/M2 | DIASTOLIC BLOOD PRESSURE: 86 MMHG | OXYGEN SATURATION: 97 % | HEART RATE: 55 BPM | SYSTOLIC BLOOD PRESSURE: 137 MMHG | WEIGHT: 195 LBS

## 2017-10-09 PROCEDURE — 74011250636 HC RX REV CODE- 250/636: Performed by: FAMILY MEDICINE

## 2017-10-09 PROCEDURE — 96372 THER/PROPH/DIAG INJ SC/IM: CPT

## 2017-10-09 RX ADMIN — DENOSUMAB 60 MG: 60 INJECTION SUBCUTANEOUS at 08:10

## 2017-10-09 NOTE — PROGRESS NOTES
Pt arrived ambulatory today at 0751, to receive Prolia. Pt tolerated without difficulty. Patient discharged via ambulatory accompanied by self. Instructed to notify physician of any problems, questions or concerns. Allowed opportunity for patient/family to ask questions. Verbalized understanding. Next appointment is March 26 at 0900 with Manny Orantes.

## 2017-10-09 NOTE — PROGRESS NOTES
Problem: Knowledge Deficit  Goal: *Verbalizes understanding of procedures and medications  Outcome: Progressing Towards Goal  Verbalizes/demonstrates understanding of purpose/procedure/potential side effects of prolia.

## 2018-04-09 ENCOUNTER — HOSPITAL ENCOUNTER (OUTPATIENT)
Dept: INFUSION THERAPY | Age: 73
End: 2018-04-09

## 2018-04-16 ENCOUNTER — HOSPITAL ENCOUNTER (OUTPATIENT)
Dept: INFUSION THERAPY | Age: 73
Discharge: HOME | End: 2018-04-16
Payer: MEDICARE

## 2018-04-16 VITALS
TEMPERATURE: 97.5 F | WEIGHT: 199 LBS | SYSTOLIC BLOOD PRESSURE: 152 MMHG | BODY MASS INDEX: 31.17 KG/M2 | RESPIRATION RATE: 16 BRPM | OXYGEN SATURATION: 95 % | DIASTOLIC BLOOD PRESSURE: 74 MMHG | HEART RATE: 55 BPM

## 2018-04-16 LAB
CALCIUM SERPL-MCNC: 9.6 MG/DL (ref 8.3–10.4)
CREAT SERPL-MCNC: 0.99 MG/DL (ref 0.8–1.5)

## 2018-04-16 PROCEDURE — 96372 THER/PROPH/DIAG INJ SC/IM: CPT

## 2018-04-16 PROCEDURE — 36415 COLL VENOUS BLD VENIPUNCTURE: CPT

## 2018-04-16 PROCEDURE — 82565 ASSAY OF CREATININE: CPT

## 2018-04-16 PROCEDURE — 82310 ASSAY OF CALCIUM: CPT

## 2018-04-16 PROCEDURE — 74011250636 HC RX REV CODE- 250/636

## 2018-04-16 RX ADMIN — DENOSUMAB 60 MG: 60 INJECTION SUBCUTANEOUS at 08:40

## 2018-04-16 NOTE — PROGRESS NOTES
Arrived to the [facility]. Prolia injection completed. Patient tolerated well. Any issues or concerns during appointment: NO.  Patient will schedule next appointment after he sees his new doctor. His current MD is moving. Discharged ambulatory.

## 2018-11-12 ENCOUNTER — HOSPITAL ENCOUNTER (OUTPATIENT)
Dept: LAB | Age: 73
Discharge: HOME | End: 2018-11-12
Payer: MEDICARE

## 2018-11-12 DIAGNOSIS — R00.2 PALPITATION: ICD-10-CM

## 2018-11-12 LAB
MAGNESIUM SERPL-MCNC: 1.9 MG/DL (ref 1.8–2.4)
TSH SERPL DL<=0.005 MIU/L-ACNC: 0.67 UIU/ML (ref 0.36–3.74)

## 2018-11-12 PROCEDURE — 36415 COLL VENOUS BLD VENIPUNCTURE: CPT

## 2018-11-12 PROCEDURE — 84443 ASSAY THYROID STIM HORMONE: CPT

## 2018-11-12 PROCEDURE — 83735 ASSAY OF MAGNESIUM: CPT

## 2018-11-19 PROBLEM — I48.0 PAF (PAROXYSMAL ATRIAL FIBRILLATION) (HCC): Status: ACTIVE | Noted: 2018-11-19

## 2019-01-08 ENCOUNTER — HOSPITAL ENCOUNTER (OUTPATIENT)
Dept: INFUSION THERAPY | Age: 74
Discharge: HOME | End: 2019-01-08
Payer: MEDICARE

## 2019-01-08 VITALS
DIASTOLIC BLOOD PRESSURE: 66 MMHG | OXYGEN SATURATION: 97 % | RESPIRATION RATE: 18 BRPM | BODY MASS INDEX: 29.98 KG/M2 | TEMPERATURE: 97.8 F | SYSTOLIC BLOOD PRESSURE: 126 MMHG | WEIGHT: 191.4 LBS | HEART RATE: 53 BPM

## 2019-01-08 LAB — CALCIUM SERPL-MCNC: 9.5 MG/DL (ref 8.3–10.4)

## 2019-01-08 PROCEDURE — 74011250636 HC RX REV CODE- 250/636: Performed by: FAMILY MEDICINE

## 2019-01-08 PROCEDURE — 82310 ASSAY OF CALCIUM: CPT

## 2019-01-08 PROCEDURE — 36415 COLL VENOUS BLD VENIPUNCTURE: CPT

## 2019-01-08 PROCEDURE — 96372 THER/PROPH/DIAG INJ SC/IM: CPT

## 2019-01-08 RX ADMIN — DENOSUMAB 60 MG: 60 INJECTION SUBCUTANEOUS at 08:25

## 2019-01-08 NOTE — PROGRESS NOTES
Arrived to the [facility]. Prolia completed. Patient tolerated well. Any issues or concerns during appointment: none. Patient aware of next infusion appointment on 7/9/19 at 8am. 
Discharged ambulatory.

## 2019-07-09 ENCOUNTER — HOSPITAL ENCOUNTER (OUTPATIENT)
Dept: INFUSION THERAPY | Age: 74
End: 2019-07-09

## 2019-08-06 ENCOUNTER — HOSPITAL ENCOUNTER (OUTPATIENT)
Dept: INFUSION THERAPY | Age: 74
Discharge: HOME | End: 2019-08-06
Payer: MEDICARE

## 2019-08-06 VITALS
SYSTOLIC BLOOD PRESSURE: 129 MMHG | HEART RATE: 89 BPM | DIASTOLIC BLOOD PRESSURE: 75 MMHG | TEMPERATURE: 97.6 F | OXYGEN SATURATION: 97 % | RESPIRATION RATE: 16 BRPM

## 2019-08-06 LAB — CALCIUM SERPL-MCNC: 9.5 MG/DL (ref 8.3–10.4)

## 2019-08-06 PROCEDURE — 74011250636 HC RX REV CODE- 250/636: Performed by: FAMILY MEDICINE

## 2019-08-06 PROCEDURE — 96372 THER/PROPH/DIAG INJ SC/IM: CPT

## 2019-08-06 PROCEDURE — 82310 ASSAY OF CALCIUM: CPT

## 2019-08-06 PROCEDURE — 36415 COLL VENOUS BLD VENIPUNCTURE: CPT

## 2019-08-06 RX ADMIN — DENOSUMAB 60 MG: 60 INJECTION SUBCUTANEOUS at 08:25

## 2019-08-06 NOTE — PROGRESS NOTES
Arrived to the [facility]. Prolia injection completed. Patient tolerated well. Any issues or concerns during appointment: none. Patient aware of next infusion appointment not scheduled at this time. Discharged ambulatory to home.

## 2021-09-01 ENCOUNTER — APPOINTMENT (OUTPATIENT)
Dept: CT IMAGING | Age: 76
DRG: 065 | End: 2021-09-01
Attending: HOSPITALIST
Payer: MEDICARE

## 2021-09-01 ENCOUNTER — HOSPITAL ENCOUNTER (INPATIENT)
Age: 76
LOS: 1 days | Discharge: HOME | DRG: 065 | End: 2021-09-03
Attending: EMERGENCY MEDICINE | Admitting: INTERNAL MEDICINE
Payer: MEDICARE

## 2021-09-01 ENCOUNTER — APPOINTMENT (OUTPATIENT)
Dept: CT IMAGING | Age: 76
DRG: 065 | End: 2021-09-01
Attending: EMERGENCY MEDICINE
Payer: MEDICARE

## 2021-09-01 DIAGNOSIS — I48.0 PAF (PAROXYSMAL ATRIAL FIBRILLATION) (HCC): ICD-10-CM

## 2021-09-01 DIAGNOSIS — R47.81 SLURRING OF SPEECH: Primary | ICD-10-CM

## 2021-09-01 LAB
ANION GAP SERPL CALC-SCNC: 5 MMOL/L (ref 7–16)
BASOPHILS # BLD: 0 K/UL (ref 0–0.2)
BASOPHILS NFR BLD: 1 % (ref 0–2)
BUN SERPL-MCNC: 18 MG/DL (ref 8–23)
CALCIUM SERPL-MCNC: 9.8 MG/DL (ref 8.3–10.4)
CHLORIDE SERPL-SCNC: 99 MMOL/L (ref 98–107)
CO2 SERPL-SCNC: 27 MMOL/L (ref 21–32)
COVID-19 RAPID TEST, COVR: NOT DETECTED
CREAT SERPL-MCNC: 0.99 MG/DL (ref 0.8–1.5)
DIFFERENTIAL METHOD BLD: ABNORMAL
EOSINOPHIL # BLD: 0.1 K/UL (ref 0–0.8)
EOSINOPHIL NFR BLD: 2 % (ref 0.5–7.8)
ERYTHROCYTE [DISTWIDTH] IN BLOOD BY AUTOMATED COUNT: 12.5 % (ref 11.9–14.6)
GLUCOSE SERPL-MCNC: 141 MG/DL (ref 65–100)
HCT VFR BLD AUTO: 40 % (ref 41.1–50.3)
HGB BLD-MCNC: 13.8 G/DL (ref 13.6–17.2)
IMM GRANULOCYTES # BLD AUTO: 0 K/UL (ref 0–0.5)
IMM GRANULOCYTES NFR BLD AUTO: 0 % (ref 0–5)
INR PPP: 1
LYMPHOCYTES # BLD: 1.4 K/UL (ref 0.5–4.6)
LYMPHOCYTES NFR BLD: 21 % (ref 13–44)
MCH RBC QN AUTO: 32.6 PG (ref 26.1–32.9)
MCHC RBC AUTO-ENTMCNC: 34.5 G/DL (ref 31.4–35)
MCV RBC AUTO: 94.6 FL (ref 79.6–97.8)
MONOCYTES # BLD: 0.8 K/UL (ref 0.1–1.3)
MONOCYTES NFR BLD: 12 % (ref 4–12)
NEUTS SEG # BLD: 4.3 K/UL (ref 1.7–8.2)
NEUTS SEG NFR BLD: 64 % (ref 43–78)
NRBC # BLD: 0 K/UL (ref 0–0.2)
PLATELET # BLD AUTO: 221 K/UL (ref 150–450)
PMV BLD AUTO: 9.7 FL (ref 9.4–12.3)
POTASSIUM SERPL-SCNC: 4.5 MMOL/L (ref 3.5–5.1)
PROTHROMBIN TIME: 13.9 SEC (ref 12.6–14.5)
RBC # BLD AUTO: 4.23 M/UL (ref 4.23–5.6)
SARS-COV-2, COV2: NORMAL
SODIUM SERPL-SCNC: 131 MMOL/L (ref 136–145)
SOURCE, COVRS: NORMAL
WBC # BLD AUTO: 6.6 K/UL (ref 4.3–11.1)

## 2021-09-01 PROCEDURE — 99218 HC RM OBSERVATION: CPT

## 2021-09-01 PROCEDURE — 99285 EMERGENCY DEPT VISIT HI MDM: CPT

## 2021-09-01 PROCEDURE — 80048 BASIC METABOLIC PNL TOTAL CA: CPT

## 2021-09-01 PROCEDURE — 85610 PROTHROMBIN TIME: CPT

## 2021-09-01 PROCEDURE — 85025 COMPLETE CBC W/AUTO DIFF WBC: CPT

## 2021-09-01 PROCEDURE — 70450 CT HEAD/BRAIN W/O DYE: CPT

## 2021-09-01 PROCEDURE — 70498 CT ANGIOGRAPHY NECK: CPT

## 2021-09-01 PROCEDURE — 74011000258 HC RX REV CODE- 258: Performed by: HOSPITALIST

## 2021-09-01 PROCEDURE — 87635 SARS-COV-2 COVID-19 AMP PRB: CPT

## 2021-09-01 PROCEDURE — 74011000636 HC RX REV CODE- 636: Performed by: HOSPITALIST

## 2021-09-01 PROCEDURE — U0003 INFECTIOUS AGENT DETECTION BY NUCLEIC ACID (DNA OR RNA); SEVERE ACUTE RESPIRATORY SYNDROME CORONAVIRUS 2 (SARS-COV-2) (CORONAVIRUS DISEASE [COVID-19]), AMPLIFIED PROBE TECHNIQUE, MAKING USE OF HIGH THROUGHPUT TECHNOLOGIES AS DESCRIBED BY CMS-2020-01-R: HCPCS

## 2021-09-01 PROCEDURE — 81003 URINALYSIS AUTO W/O SCOPE: CPT

## 2021-09-01 RX ORDER — ROSUVASTATIN CALCIUM 20 MG/1
40 TABLET, COATED ORAL
Status: DISCONTINUED | OUTPATIENT
Start: 2021-09-01 | End: 2021-09-03 | Stop reason: HOSPADM

## 2021-09-01 RX ORDER — INSULIN LISPRO 100 [IU]/ML
INJECTION, SOLUTION INTRAVENOUS; SUBCUTANEOUS
Status: DISCONTINUED | OUTPATIENT
Start: 2021-09-01 | End: 2021-09-03 | Stop reason: HOSPADM

## 2021-09-01 RX ORDER — METOPROLOL SUCCINATE 25 MG/1
25 TABLET, EXTENDED RELEASE ORAL DAILY
Status: DISCONTINUED | OUTPATIENT
Start: 2021-09-02 | End: 2021-09-03 | Stop reason: HOSPADM

## 2021-09-01 RX ORDER — SODIUM CHLORIDE 0.9 % (FLUSH) 0.9 %
5-40 SYRINGE (ML) INJECTION EVERY 8 HOURS
Status: DISCONTINUED | OUTPATIENT
Start: 2021-09-01 | End: 2021-09-03 | Stop reason: HOSPADM

## 2021-09-01 RX ORDER — SODIUM CHLORIDE 0.9 % (FLUSH) 0.9 %
5-40 SYRINGE (ML) INJECTION AS NEEDED
Status: DISCONTINUED | OUTPATIENT
Start: 2021-09-01 | End: 2021-09-03 | Stop reason: HOSPADM

## 2021-09-01 RX ORDER — HEPARIN SODIUM 5000 [USP'U]/ML
5000 INJECTION, SOLUTION INTRAVENOUS; SUBCUTANEOUS EVERY 8 HOURS
Status: DISCONTINUED | OUTPATIENT
Start: 2021-09-01 | End: 2021-09-01 | Stop reason: ALTCHOICE

## 2021-09-01 RX ORDER — SODIUM CHLORIDE 0.9 % (FLUSH) 0.9 %
10 SYRINGE (ML) INJECTION
Status: COMPLETED | OUTPATIENT
Start: 2021-09-01 | End: 2021-09-01

## 2021-09-01 RX ADMIN — Medication 10 ML: at 22:33

## 2021-09-01 RX ADMIN — SODIUM CHLORIDE 100 ML: 900 INJECTION, SOLUTION INTRAVENOUS at 22:33

## 2021-09-01 RX ADMIN — IOPAMIDOL 50 ML: 755 INJECTION, SOLUTION INTRAVENOUS at 22:33

## 2021-09-01 NOTE — ED TRIAGE NOTES
Patient arrives ambulatory to triage with mask in place. Patient reports that daughter and wife were concerned that he was having slurred speech and facial droop. Patient denies unilateral weakness, denies difficulty with vision from baseline; denies changes in sensation. Onset around 2pm.  Dr. Yani An to assess patient in triage.

## 2021-09-02 ENCOUNTER — APPOINTMENT (OUTPATIENT)
Dept: MRI IMAGING | Age: 76
DRG: 065 | End: 2021-09-02
Attending: INTERNAL MEDICINE
Payer: MEDICARE

## 2021-09-02 PROBLEM — R47.81 SLURRING OF SPEECH: Status: RESOLVED | Noted: 2021-09-01 | Resolved: 2021-09-02

## 2021-09-02 PROBLEM — I63.9 CVA (CEREBRAL VASCULAR ACCIDENT) (HCC): Status: ACTIVE | Noted: 2021-09-02

## 2021-09-02 LAB
ALBUMIN SERPL-MCNC: 3.7 G/DL (ref 3.2–4.6)
ALBUMIN/GLOB SERPL: 1.2 {RATIO} (ref 1.2–3.5)
ALP SERPL-CCNC: 91 U/L (ref 50–136)
ALT SERPL-CCNC: 22 U/L (ref 12–65)
ANION GAP SERPL CALC-SCNC: 6 MMOL/L (ref 7–16)
AST SERPL-CCNC: 19 U/L (ref 15–37)
BILIRUB SERPL-MCNC: 0.6 MG/DL (ref 0.2–1.1)
BUN SERPL-MCNC: 14 MG/DL (ref 8–23)
CALCIUM SERPL-MCNC: 9.3 MG/DL (ref 8.3–10.4)
CHLORIDE SERPL-SCNC: 101 MMOL/L (ref 98–107)
CHOLEST SERPL-MCNC: 126 MG/DL
CO2 SERPL-SCNC: 25 MMOL/L (ref 21–32)
CREAT SERPL-MCNC: 0.81 MG/DL (ref 0.8–1.5)
EST. AVERAGE GLUCOSE BLD GHB EST-MCNC: 143 MG/DL
GLOBULIN SER CALC-MCNC: 3 G/DL (ref 2.3–3.5)
GLUCOSE BLD STRIP.AUTO-MCNC: 114 MG/DL (ref 65–100)
GLUCOSE BLD STRIP.AUTO-MCNC: 127 MG/DL (ref 65–100)
GLUCOSE BLD STRIP.AUTO-MCNC: 152 MG/DL (ref 65–100)
GLUCOSE BLD STRIP.AUTO-MCNC: 154 MG/DL (ref 65–100)
GLUCOSE BLD STRIP.AUTO-MCNC: 85 MG/DL (ref 65–100)
GLUCOSE SERPL-MCNC: 153 MG/DL (ref 65–100)
HBA1C MFR BLD: 6.6 % (ref 4.2–6.3)
HDLC SERPL-MCNC: 53 MG/DL (ref 40–60)
HDLC SERPL: 2.4 {RATIO}
LDLC SERPL CALC-MCNC: 62.2 MG/DL
POTASSIUM SERPL-SCNC: 4.3 MMOL/L (ref 3.5–5.1)
PROT SERPL-MCNC: 6.7 G/DL (ref 6.3–8.2)
SARS-COV-2, COV2: NOT DETECTED
SERVICE CMNT-IMP: ABNORMAL
SERVICE CMNT-IMP: NORMAL
SODIUM SERPL-SCNC: 132 MMOL/L (ref 138–145)
SPECIMEN SOURCE, FCOV2M: NORMAL
T4 FREE SERPL-MCNC: 1.2 NG/DL (ref 0.78–1.46)
TRIGL SERPL-MCNC: 54 MG/DL (ref 35–150)
TSH SERPL DL<=0.005 MIU/L-ACNC: 0.69 UIU/ML (ref 0.36–3.74)
VLDLC SERPL CALC-MCNC: 10.8 MG/DL (ref 6–23)

## 2021-09-02 PROCEDURE — 2709999900 HC NON-CHARGEABLE SUPPLY

## 2021-09-02 PROCEDURE — 92610 EVALUATE SWALLOWING FUNCTION: CPT

## 2021-09-02 PROCEDURE — 83036 HEMOGLOBIN GLYCOSYLATED A1C: CPT

## 2021-09-02 PROCEDURE — 36415 COLL VENOUS BLD VENIPUNCTURE: CPT

## 2021-09-02 PROCEDURE — 82962 GLUCOSE BLOOD TEST: CPT

## 2021-09-02 PROCEDURE — 84443 ASSAY THYROID STIM HORMONE: CPT

## 2021-09-02 PROCEDURE — 99218 HC RM OBSERVATION: CPT

## 2021-09-02 PROCEDURE — 74011636637 HC RX REV CODE- 636/637: Performed by: HOSPITALIST

## 2021-09-02 PROCEDURE — 70551 MRI BRAIN STEM W/O DYE: CPT

## 2021-09-02 PROCEDURE — 99223 1ST HOSP IP/OBS HIGH 75: CPT | Performed by: PSYCHIATRY & NEUROLOGY

## 2021-09-02 PROCEDURE — 74011250637 HC RX REV CODE- 250/637: Performed by: NURSE PRACTITIONER

## 2021-09-02 PROCEDURE — 97165 OT EVAL LOW COMPLEX 30 MIN: CPT

## 2021-09-02 PROCEDURE — 74011250637 HC RX REV CODE- 250/637: Performed by: HOSPITALIST

## 2021-09-02 PROCEDURE — 80053 COMPREHEN METABOLIC PANEL: CPT

## 2021-09-02 PROCEDURE — 74011000250 HC RX REV CODE- 250: Performed by: INTERNAL MEDICINE

## 2021-09-02 PROCEDURE — 80061 LIPID PANEL: CPT

## 2021-09-02 PROCEDURE — 74011250637 HC RX REV CODE- 250/637: Performed by: INTERNAL MEDICINE

## 2021-09-02 PROCEDURE — 84439 ASSAY OF FREE THYROXINE: CPT

## 2021-09-02 RX ORDER — LABETALOL HYDROCHLORIDE 5 MG/ML
10 INJECTION, SOLUTION INTRAVENOUS
Status: DISCONTINUED | OUTPATIENT
Start: 2021-09-02 | End: 2021-09-03 | Stop reason: HOSPADM

## 2021-09-02 RX ORDER — FLECAINIDE ACETATE 100 MG/1
100 TABLET ORAL 2 TIMES DAILY
Status: DISCONTINUED | OUTPATIENT
Start: 2021-09-02 | End: 2021-09-03 | Stop reason: HOSPADM

## 2021-09-02 RX ORDER — LOSARTAN POTASSIUM 50 MG/1
50 TABLET ORAL DAILY
Status: DISCONTINUED | OUTPATIENT
Start: 2021-09-03 | End: 2021-09-03 | Stop reason: HOSPADM

## 2021-09-02 RX ORDER — LATANOPROST 50 UG/ML
1 SOLUTION/ DROPS OPHTHALMIC EVERY EVENING
Status: DISCONTINUED | OUTPATIENT
Start: 2021-09-02 | End: 2021-09-03 | Stop reason: HOSPADM

## 2021-09-02 RX ORDER — ACETAMINOPHEN 325 MG/1
650 TABLET ORAL
Status: DISCONTINUED | OUTPATIENT
Start: 2021-09-02 | End: 2021-09-03 | Stop reason: HOSPADM

## 2021-09-02 RX ORDER — TIMOLOL MALEATE 2.5 MG/ML
1 SOLUTION/ DROPS OPHTHALMIC DAILY
Status: DISCONTINUED | OUTPATIENT
Start: 2021-09-03 | End: 2021-09-03 | Stop reason: HOSPADM

## 2021-09-02 RX ORDER — CLOPIDOGREL BISULFATE 75 MG/1
75 TABLET ORAL DAILY
Status: DISCONTINUED | OUTPATIENT
Start: 2021-09-02 | End: 2021-09-03 | Stop reason: HOSPADM

## 2021-09-02 RX ADMIN — APIXABAN 5 MG: 5 TABLET, FILM COATED ORAL at 17:25

## 2021-09-02 RX ADMIN — CLOPIDOGREL BISULFATE 75 MG: 75 TABLET ORAL at 12:39

## 2021-09-02 RX ADMIN — Medication 5 ML: at 06:31

## 2021-09-02 RX ADMIN — Medication 5 ML: at 01:39

## 2021-09-02 RX ADMIN — Medication 10 ML: at 14:04

## 2021-09-02 RX ADMIN — APIXABAN 5 MG: 5 TABLET, FILM COATED ORAL at 08:58

## 2021-09-02 RX ADMIN — ROSUVASTATIN CALCIUM 40 MG: 20 TABLET, COATED ORAL at 01:40

## 2021-09-02 RX ADMIN — INSULIN LISPRO 2 UNITS: 100 INJECTION, SOLUTION INTRAVENOUS; SUBCUTANEOUS at 01:38

## 2021-09-02 RX ADMIN — ROSUVASTATIN CALCIUM 40 MG: 20 TABLET, COATED ORAL at 21:21

## 2021-09-02 RX ADMIN — FLECAINIDE ACETATE 100 MG: 100 TABLET ORAL at 18:49

## 2021-09-02 RX ADMIN — METOPROLOL SUCCINATE 25 MG: 25 TABLET, EXTENDED RELEASE ORAL at 08:57

## 2021-09-02 RX ADMIN — ACETAMINOPHEN 650 MG: 325 TABLET ORAL at 19:42

## 2021-09-02 RX ADMIN — Medication 5 ML: at 21:22

## 2021-09-02 RX ADMIN — LATANOPROST 1 DROP: 50 SOLUTION OPHTHALMIC at 18:50

## 2021-09-02 RX ADMIN — INSULIN LISPRO 2 UNITS: 100 INJECTION, SOLUTION INTRAVENOUS; SUBCUTANEOUS at 12:39

## 2021-09-02 NOTE — ED NOTES
TRANSFER - OUT REPORT:    Verbal report given to [facility] rn (name) on Amara Shows  being transferred to [facility] [phone] (unit) for routine progression of care       Report consisted of patients Situation, Background, Assessment and   Recommendations(SBAR). Information from the following report(s) SBAR, ED Summary, Intake/Output and Recent Results was reviewed with the receiving nurse. Lines:   Peripheral IV 09/01/21 Left Antecubital (Active)   Site Assessment Clean, dry, & intact 09/01/21 1906   Phlebitis Assessment 0 09/01/21 1906   Infiltration Assessment 0 09/01/21 1906   Dressing Status Clean, dry, & intact 09/01/21 1906   Dressing Type 4 X 4 09/01/21 1906   Hub Color/Line Status Pink 09/01/21 1906        Opportunity for questions and clarification was provided.       Patient transported with:   Registered Nurse

## 2021-09-02 NOTE — PROGRESS NOTES
Problem: Patient Education: Go to Patient Education Activity  Goal: Patient/Family Education  Outcome: Progressing Towards Goal     Problem: TIA/CVA Stroke: 0-24 hours  Goal: Off Pathway (Use only if patient is Off Pathway)  Outcome: Progressing Towards Goal  Goal: Activity/Safety  Outcome: Progressing Towards Goal  Goal: Consults, if ordered  Outcome: Progressing Towards Goal  Goal: Diagnostic Test/Procedures  Outcome: Progressing Towards Goal  Goal: Nutrition/Diet  Outcome: Progressing Towards Goal  Goal: Discharge Planning  Outcome: Progressing Towards Goal  Goal: Medications  Outcome: Progressing Towards Goal  Goal: Respiratory  Outcome: Progressing Towards Goal  Goal: Treatments/Interventions/Procedures  Outcome: Progressing Towards Goal  Goal: Minimize risk of bleeding post-thrombolytic infusion  Outcome: Progressing Towards Goal  Goal: Monitor for complications post-thrombolytic infusion  Outcome: Progressing Towards Goal  Goal: Psychosocial  Outcome: Progressing Towards Goal  Goal: *Hemodynamically stable  Outcome: Progressing Towards Goal  Goal: *Neurologically stable  Description: Absence of additional neurological deficits    Outcome: Progressing Towards Goal  Goal: *Verbalizes anxiety and depression are reduced or absent  Outcome: Progressing Towards Goal  Goal: *Absence of Signs of Aspiration on Current Diet  Outcome: Progressing Towards Goal  Goal: *Absence of deep venous thrombosis signs and symptoms(Stroke Metric)  Outcome: Progressing Towards Goal  Goal: *Ability to perform ADLs and demonstrates progressive mobility and function  Outcome: Progressing Towards Goal  Goal: *Stroke education started(Stroke Metric)  Outcome: Progressing Towards Goal  Goal: *Dysphagia screen performed(Stroke Metric)  Outcome: Progressing Towards Goal  Goal: *Rehab consulted(Stroke Metric)  Outcome: Progressing Towards Goal     Problem: TIA/CVA Stroke: Day 2 Until Discharge  Goal: Off Pathway (Use only if patient is Off Pathway)  Outcome: Progressing Towards Goal  Goal: Activity/Safety  Outcome: Progressing Towards Goal  Goal: Diagnostic Test/Procedures  Outcome: Progressing Towards Goal  Goal: Nutrition/Diet  Outcome: Progressing Towards Goal  Goal: Discharge Planning  Outcome: Progressing Towards Goal  Goal: Medications  Outcome: Progressing Towards Goal  Goal: Respiratory  Outcome: Progressing Towards Goal  Goal: Treatments/Interventions/Procedures  Outcome: Progressing Towards Goal  Goal: Psychosocial  Outcome: Progressing Towards Goal  Goal: *Verbalizes anxiety and depression are reduced or absent  Outcome: Progressing Towards Goal  Goal: *Absence of aspiration  Outcome: Progressing Towards Goal  Goal: *Absence of deep venous thrombosis signs and symptoms(Stroke Metric)  Outcome: Progressing Towards Goal  Goal: *Optimal pain control at patient's stated goal  Outcome: Progressing Towards Goal  Goal: *Tolerating diet  Outcome: Progressing Towards Goal  Goal: *Ability to perform ADLs and demonstrates progressive mobility and function  Outcome: Progressing Towards Goal  Goal: *Stroke education continued(Stroke Metric)  Outcome: Progressing Towards Goal     Problem: Ischemic Stroke: Discharge Outcomes  Goal: *Verbalizes anxiety and depression are reduced or absent  Outcome: Progressing Towards Goal  Goal: *Verbalize understanding of risk factor modification(Stroke Metric)  Outcome: Progressing Towards Goal  Goal: *Hemodynamically stable  Outcome: Progressing Towards Goal  Goal: *Absence of aspiration pneumonia  Outcome: Progressing Towards Goal  Goal: *Aware of needed dietary changes  Outcome: Progressing Towards Goal  Goal: *Verbalize understanding of prescribed medications including anti-coagulants, anti-lipid, and/or anti-platelets(Stroke Metric)  Outcome: Progressing Towards Goal  Goal: *Tolerating diet  Outcome: Progressing Towards Goal  Goal: *Aware of follow-up diagnostics related to anticoagulants  Outcome: Progressing Towards Goal  Goal: *Ability to perform ADLs and demonstrates progressive mobility and function  Outcome: Progressing Towards Goal  Goal: *Absence of DVT(Stroke Metric)  Outcome: Progressing Towards Goal  Goal: *Absence of aspiration  Outcome: Progressing Towards Goal  Goal: *Optimal pain control at patient's stated goal  Outcome: Progressing Towards Goal  Goal: *Home safety concerns addressed  Outcome: Progressing Towards Goal  Goal: *Describes available resources and support systems  Outcome: Progressing Towards Goal  Goal: *Verbalizes understanding of activation of EMS(911) for stroke symptoms(Stroke Metric)  Outcome: Progressing Towards Goal  Goal: *Understands and describes signs and symptoms to report to providers(Stroke Metric)  Outcome: Progressing Towards Goal  Goal: *Neurolgocially stable (absence of additional neurological deficits)  Outcome: Progressing Towards Goal  Goal: *Verbalizes importance of follow-up with primary care physician(Stroke Metric)  Outcome: Progressing Towards Goal  Goal: *Smoking cessation discussed,if applicable(Stroke Metric)  Outcome: Progressing Towards Goal  Goal: *Depression screening completed(Stroke Metric)  Outcome: Progressing Towards Goal     Problem: Falls - Risk of  Goal: *Absence of Falls  Description: Document Darrius Fall Risk and appropriate interventions in the flowsheet.   Outcome: Progressing Towards Goal  Note: Fall Risk Interventions:  Mobility Interventions: Bed/chair exit alarm              Elimination Interventions: Bed/chair exit alarm, Call light in reach, Patient to call for help with toileting needs    History of Falls Interventions: Bed/chair exit alarm         Problem: Patient Education: Go to Patient Education Activity  Goal: Patient/Family Education  Outcome: Progressing Towards Goal     Problem: Diabetes Self-Management  Goal: *Disease process and treatment process  Description: Define diabetes and identify own type of diabetes; list 3 options for treating diabetes. Outcome: Progressing Towards Goal  Goal: *Incorporating nutritional management into lifestyle  Description: Describe effect of type, amount and timing of food on blood glucose; list 3 methods for planning meals. Outcome: Progressing Towards Goal  Goal: *Incorporating physical activity into lifestyle  Description: State effect of exercise on blood glucose levels. Outcome: Progressing Towards Goal  Goal: *Developing strategies to promote health/change behavior  Description: Define the ABC's of diabetes; identify appropriate screenings, schedule and personal plan for screenings. Outcome: Progressing Towards Goal  Goal: *Using medications safely  Description: State effect of diabetes medications on diabetes; name diabetes medication taking, action and side effects. Outcome: Progressing Towards Goal  Goal: *Monitoring blood glucose, interpreting and using results  Description: Identify recommended blood glucose targets  and personal targets. Outcome: Progressing Towards Goal  Goal: *Prevention, detection, treatment of acute complications  Description: List symptoms of hyper- and hypoglycemia; describe how to treat low blood sugar and actions for lowering  high blood glucose level. Outcome: Progressing Towards Goal  Goal: *Prevention, detection and treatment of chronic complications  Description: Define the natural course of diabetes and describe the relationship of blood glucose levels to long term complications of diabetes.   Outcome: Progressing Towards Goal  Goal: *Developing strategies to address psychosocial issues  Description: Describe feelings about living with diabetes; identify support needed and support network  Outcome: Progressing Towards Goal  Goal: *Insulin pump training  Outcome: Progressing Towards Goal  Goal: *Sick day guidelines  Outcome: Progressing Towards Goal  Goal: *Patient Specific Goal (EDIT GOAL, INSERT TEXT)  Outcome: Progressing Towards Goal     Problem: Patient Education: Go to Patient Education Activity  Goal: Patient/Family Education  Outcome: Progressing Towards Goal

## 2021-09-02 NOTE — CONSULTS
History and Physical/Surgical Consult   Elisabeth Herbert    Admit date: 2021    MRN: [id number]     : 1945     Age: 77 y.o.          2021 1:39 PM    Subjective/HPI:   This patient is a 77 y.o.  male seen and evaluated at the request of Dr. Arvind Liang with stroke like symptoms. He presented to the ER yesterday with some communication difficulty throughout yesterday. CTA of the head and neck and CT head were performed. 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 MRI was done today which showed some subacute ischemic changes involving the R putamen but no left brain events seen. We are asked to evaluate him for the LICA stenosis and set up outpatient visit for consideration of surgery. He has chronic afib on eliquis which he reports compliance taking. His cardiologist is Dr. Tatiana Berkowitz. He has been started on plavix also while here. Review of Systems  Pertinent items are noted in HPI.   Past Medical History:   Diagnosis Date    Acute maxillary sinusitis     Acute pharyngitis     Aftercare for long-term (current) use of antiplatelets/antithrombotics 2016    Allergic rhinitis     Anemia 2016    Backache     CAD (coronary artery disease) 2016    Carotid artery disease ([facility] 2016    Decreased libido     Diabetes ([facility] 2016    Dizziness and giddiness     ED (erectile dysfunction) 2016    Encounter for monitoring testosterone replacement therapy 2016    Fatigue     Fever blister     GERD (gastroesophageal reflux disease)     controlled with nexium    Glaucoma 2016    Hearing difficulty of right ear 2016    High cholesterol     High risk medication use 2016    Hypertension 2016    Hyponatremia 2016    IBS (irritable bowel syndrome) 2016    Intertrigo 2016    Irregular heart beat 2016    Left shoulder pain     Mouth pain     Obstructive sleep apnea of adult 2016    Osteoporosis 2016    Palpitations     Plantar wart of left foot 2016    Rotator cuff tear     Suspected Diagnosis    Sinus bradycardia 2016    Sinusitis     Skin infection     Sore throat     Special screening for malignant neoplasm of prostate     Subclinical hyperthyroidism     Tinea corporis       Past Surgical History:   Procedure Laterality Date    HX UROLOGICAL      age 17,--urethra narrowing. Shea Gill Had bladder infection/?incomplete emptying      No Known Allergies   Social History     Tobacco Use    Smoking status: Former Smoker     Quit date: 3/10/1978     Years since quittin.5    Smokeless tobacco: Never Used    Tobacco comment: quit 1978 new year's [employer]   Substance Use Topics    Alcohol use: Yes     Alcohol/week: 6.0 - 7.0 standard drinks     Types: 6 - 7 Glasses of wine per week     Comment: beer couple a day      Social History     Social History Narrative    Not on file     Family History   Problem Relation Age of Onset    Cancer Father         hodgkin's lymphoma    Sudden Death Father         age,68, presumed MI, heavey smoker      Prior to Admission Medications   Prescriptions Last Dose Informant Patient Reported? Taking? Blood Pressure Monitor (BLOOD PRESSURE KIT) kit   No No   Sig: Check BP daily. apixaban (ELIQUIS) 5 mg tablet   No No   Sig: Take 1 Tab by mouth two (2) times a day. calcium citrate-vitamin D3 (CITRACAL + D) tablet   No No   Sig: One am(with centrum) and two pm   Indications: OSTEOPOROSIS   flecainide (TAMBOCOR) 100 mg tablet   No No   Sig: Take 1 Tablet by mouth two (2) times a day. latanoprost (XALATAN) 0.005 % ophthalmic solution   Yes No   Sig: USE 1 DROP IN BOTH EYES EVERY EVENING   losartan (COZAAR) 50 mg tablet   No No   Sig: Take 1 Tab by mouth daily. magnesium oxide (MAG-OX) 400 mg tablet   No No   Sig: Take 1 Tab by mouth two (2) times a day.    metoprolol succinate (TOPROL-XL) 25 mg XL tablet   No No   Sig: Take 1 Tablet by mouth daily. multivitamin (ONE A DAY) tablet  Self Yes No   Sig: Take 1 Tab by mouth daily. Centrum silver last dose 01/14/10    omega 3-dha-epa-fish oil (FISH OIL) 900-1,400 mg cpDR   No No   Sig: Take 2 Caps by mouth daily for 90 days. Last dose 01/14/10   Indications: ARTERIOSCLEROTIC VASCULAR DISEASE PREVENTION   rosuvastatin (CRESTOR) 40 mg tablet   No No   Sig: Take 1 Tab by mouth nightly. timolol (TIMOPTIC) 0.25 % ophthalmic solution   Yes No   Sig: Administer 1 Drop to both eyes daily. Facility-Administered Medications: None     Current Facility-Administered Medications   Medication Dose Route Frequency    clopidogreL (PLAVIX) tablet 75 mg  75 mg Oral DAILY    sodium chloride (NS) flush 5-40 mL  5-40 mL IntraVENous Q8H    sodium chloride (NS) flush 5-40 mL  5-40 mL IntraVENous PRN    insulin lispro (HUMALOG) injection   SubCUTAneous AC&HS    rosuvastatin (CRESTOR) tablet 40 mg  40 mg Oral QHS    metoprolol succinate (TOPROL-XL) XL tablet 25 mg  25 mg Oral DAILY    apixaban (ELIQUIS) tablet 5 mg  5 mg Oral BID     Objective:     Vitals:    09/02/21 0400 09/02/21 0800 09/02/21 0839 09/02/21 1200   BP: (!) 153/89 (!) 143/84  133/78   Pulse: 61 70  89   Resp: 18 18  19   Temp: 97.6 °F (36.4 °C) 98.2 °F (36.8 °C)  97.5 °F (36.4 °C)   SpO2: 99% 95%  97%   Weight:       Height:   5' 8\" (1.727 m)      No intake/output data recorded. No intake/output data recorded. Physical Exam:   Gen- the patient is well developed and in no acute distress  HEENT- PERRL, EOMI, no scleral icterus       nose without alar flaring or epistaxis                  oral mus[place]a moist without cyanosis  Neck- no JVD or retractions  Lungs- resp even/unlab   Heart- RRR   Abd- soft, flat, obese  Ext- warm without cyanosis. There is no lower leg edema. Skin- no jaundice or rashes  Neuro- alert and oriented x 3. No gross sensorimotor deficits are present.      Data Review   Recent Labs     09/01/21  1914 WBC 6.6   HGB 13.8   HCT 40.0*        Recent Labs     09/02/21  0837 09/01/21  1914   * 131*   K 4.3 4.5    99   CO2 25 27   * 141*   BUN 14 18   CREA 0.81 0.99   INR  --  1.0       Assessment:     Hospital Problems  Date Reviewed: 5/5/2020        Codes Class Noted POA    * (Principal) Slurring of speech ICD-10-CM: R47.81  ICD-9-CM: 784.59  9/1/2021 Unknown        PAF (paroxysmal atrial fibrillation) ([facility] ICD-10-CM: I48.0  ICD-9-CM: 427.31  11/19/2018 Yes        Diabetes mellitus type 2, controlled ([facility] ICD-10-CM: E11.9  ICD-9-CM: 250.00  9/29/2016 Unknown        Dyslipidemia ICD-10-CM: E78.5  ICD-9-CM: 272.4  3/10/2016 Yes            Plan: Thank you very much for this referral.  We appreciate the opportunity to participate in this patient's care. LICA stenosis is likely asymptomatic and not related to the symptoms he had which prompted his admission here. Agree with DAPT and will see him  Tuesday September 14th at 0800 At [facility] with DR. Tyrone Enriquez. Pt in agreement with this plan of care. Kelley Willard NP

## 2021-09-02 NOTE — H&P
Hospitalist History and Physical   Admit Date:  2021  7:37 PM   Name:  Shayy Triplett   Age:  77 y.o. Sex:  male  :  1945   MRN:  [id number]     Presenting Complaint: Facial Droop    Reason(s) for Admission: Slurring of speech [R47.81]     History of Present Illness:   Shayy Triplett is a 77 y.o. male with medical history of chronic atrial fibrillation on anticoagulation with Eliquis, history of hypertension history of diabetes type 2 brought to emergency room as patient's daughter-in-law is concerned that patient was having facial droop and slurring in speech. By the time patient came to emergency room patient symptoms looks like improved, when I speak to patient patient able to talk full sentences without any difficulty. Patient symptoms going on since yesterday evening on and off but improved. Evaluated by ER physician, telemetry neurologist also evaluated the patient, recommended inpatient observation to obtain MRI. Review of Systems:  10 systems reviewed and negative except as noted in HPI. Assessment & Plan:   Principal Problem:      Slurring of speech (2021): I will obtain echocardiogram, MRI brain, CTA head and neck, will be admitted to telemetry. Active Problems:      Dyslipidemia (3/10/2016):    Continue on home medications. Diabetes mellitus type 2, controlled ([facility] (2016):    Continue on home medications. PAF (paroxysmal atrial fibrillation) ([facility] (2018):      Continue on Eliquis, aspirin. Disposition/Discharge Planning: Home with family    Diet: ADULT DIET Regular; 3 carb choices (45 gm/meal)  VTE ppx:  On DOAC  Code status: Full Code      Past Medical History:   Diagnosis Date    Acute maxillary sinusitis     Acute pharyngitis     Aftercare for long-term (current) use of antiplatelets/antithrombotics 2016    Allergic rhinitis     Anemia 2016    Backache     CAD (coronary artery disease) 2016    Carotid artery disease ([facility] 2016    Decreased libido     Diabetes ([facility] 2016    Dizziness and giddiness     ED (erectile dysfunction) 2016    Encounter for monitoring testosterone replacement therapy 2016    Fatigue     Fever blister     GERD (gastroesophageal reflux disease)     controlled with nexium    Glaucoma 2016    Hearing difficulty of right ear 2016    High cholesterol     High risk medication use 2016    Hypertension 2016    Hyponatremia 2016    IBS (irritable bowel syndrome) 2016    Intertrigo 2016    Irregular heart beat 2016    Left shoulder pain     Mouth pain     Obstructive sleep apnea of adult 2016    Osteoporosis 2016    Palpitations     Plantar wart of left foot 2016    Rotator cuff tear     Suspected Diagnosis    Sinus bradycardia 2016    Sinusitis     Skin infection     Sore throat     Special screening for malignant neoplasm of prostate     Subclinical hyperthyroidism     Tinea corporis      Past Surgical History:   Procedure Laterality Date    HX UROLOGICAL      age 17,--urethra narrowing. [facility] [place] [facility] Had bladder infection/?incomplete emptying      No Known Allergies   Social History     Tobacco Use    Smoking status: Former Smoker     Quit date: 3/10/1978     Years since quittin.5    Smokeless tobacco: Never Used    Tobacco comment: quit 1978 new year[employer]   Substance Use Topics    Alcohol use: Yes     Alcohol/week: 6.0 - 7.0 standard drinks     Types: 6 - 7 Glasses of wine per week     Comment: beer couple a day      Family History   Problem Relation Age of Onset    Cancer Father         hodgkin's lymphoma    Sudden Death Father         age,68, presumed MI, heavey smoker      Family history reviewed and noncontributory to patient's acute condition; no relevant family history unless otherwise noted above.   Immunization History   Administered Date(s) Administered    COVID-19, PFIZER, MRNA, LNP-S, PF, 30MCG/0.3ML DOSE 02/13/2021    Influenza High Dose Vaccine PF 09/25/2017    Influenza Vaccine 09/25/2015    Pneumococcal Conjugate (PCV-13) 03/05/2015    Pneumococcal Polysaccharide (PPSV-23) 11/29/2006, 09/25/2017    Tdap 11/04/2008     PTA Medications:  Current Outpatient Medications   Medication Instructions    apixaban (ELIQUIS) 5 mg, Oral, 2 TIMES DAILY    Blood Pressure Monitor (BLOOD PRESSURE KIT) kit Check BP daily.  calcium citrate-vitamin D3 (CITRACAL + D) tablet One am(with centrum) and two pm     flecainide (TAMBOCOR) 100 mg, Oral, 2 TIMES DAILY    latanoprost (XALATAN) 0.005 % ophthalmic solution USE 1 DROP IN BOTH EYES EVERY EVENING    losartan (COZAAR) 50 mg, Oral, DAILY    magnesium oxide (MAG-OX) 400 mg, Oral, 2 TIMES DAILY    metoprolol succinate (TOPROL-XL) 25 mg, Oral, DAILY    multivitamin (ONE A DAY) tablet 1 Tablet, DAILY    omega 3-dha-epa-fish oil (FISH OIL) 900-1,400 mg cpDR 2 Capsules, Oral, DAILY, Last dose 01/14/10     rosuvastatin (CRESTOR) 40 mg, Oral, EVERY BEDTIME    timolol (TIMOPTIC) 0.25 % ophthalmic solution 1 Drop, Both Eyes, DAILY       Objective:     Patient Vitals for the past 24 hrs:   Temp Pulse Resp BP SpO2   09/01/21 2052  68 15  98 %   09/01/21 2051  67 18  98 %   09/01/21 2047  84      09/01/21 2046  69   98 %   09/01/21 2025  68   97 %   09/01/21 2022  72  (!) 154/82 97 %   09/01/21 2001  71  (!) 177/84 96 %   09/01/21 1957  70  (!) 177/84 97 %   09/01/21 1941  70  (!) 177/84 98 %   09/01/21 1937  71  (!) 176/81 98 %   09/01/21 1904 98.6 °F (37 °C) 75 16 (!) 147/79 95 %     Oxygen Therapy  O2 Sat (%): 98 % (09/01/21 2052)  Pulse via Oximetry: 69 beats per minute (09/01/21 2052)  O2 Device: None (Room air) (09/01/21 1908)    Estimated body mass index is 29.65 kg/m² as calculated from the following:    Height as of 7/19/21: 5' 8\" (1.727 m). Weight as of this encounter: 88.5 kg (195 lb).   No intake or output data in the 24 hours ending 09/01/21 2226      Physical Exam:    General:    Well nourished. No overt distress  Head:  Normocephalic, atraumatic  Eyes:  Sclerae appear normal.  Pupils equally round. HENT:  Nares appear normal, no drainage. Moist mucous membranes  Neck:  No restricted ROM. Trachea midline  CV:   RRR. No m/r/g. No JVD  Lungs:   CTAB. No wheezing, rhonchi, or rales. Appears even, unlabored  Abdomen: Bowel sounds present. Soft, nontender, nondistended. Extremities: Warm and dry. No cyanosis or clubbing. No edema. Skin:     No rashes. Normal turgor. Normal coloration  Neuro:  Cranial nerves II-XII grossly intact. Sensation intact  Psych:  Normal mood and affect. Alert and oriented x3    Data Ordered and Personally Reviewed:    Last 24hr Labs:  Recent Results (from the past 24 hour(s))   CBC WITH AUTOMATED DIFF    Collection Time: 09/01/21  7:14 PM   Result Value Ref Range    WBC 6.6 4.3 - 11.1 K/uL    RBC 4.23 4.23 - 5.6 M/uL    HGB 13.8 13.6 - 17.2 g/dL    HCT 40.0 (L) 41.1 - 50.3 %    MCV 94.6 79.6 - 97.8 FL    MCH 32.6 26.1 - 32.9 PG    MCHC 34.5 31.4 - 35.0 g/dL    RDW 12.5 11.9 - 14.6 %    PLATELET 927 092 - 400 K/uL    MPV 9.7 9.4 - 12.3 FL    ABSOLUTE NRBC 0.00 0.0 - 0.2 K/uL    DF AUTOMATED      NEUTROPHILS 64 43 - 78 %    LYMPHOCYTES 21 13 - 44 %    MONOCYTES 12 4.0 - 12.0 %    EOSINOPHILS 2 0.5 - 7.8 %    BASOPHILS 1 0.0 - 2.0 %    IMMATURE GRANULOCYTES 0 0.0 - 5.0 %    ABS. NEUTROPHILS 4.3 1.7 - 8.2 K/UL    ABS. LYMPHOCYTES 1.4 0.5 - 4.6 K/UL    ABS. MONOCYTES 0.8 0.1 - 1.3 K/UL    ABS. EOSINOPHILS 0.1 0.0 - 0.8 K/UL    ABS. BASOPHILS 0.0 0.0 - 0.2 K/UL    ABS. IMM.  GRANS. 0.0 0.0 - 0.5 K/UL   METABOLIC PANEL, BASIC    Collection Time: 09/01/21  7:14 PM   Result Value Ref Range    Sodium 131 (L) 136 - 145 mmol/L    Potassium 4.5 3.5 - 5.1 mmol/L    Chloride 99 98 - 107 mmol/L    CO2 27 21 - 32 mmol/L    Anion gap 5 (L) 7 - 16 mmol/L    Glucose 141 (H) 65 - 100 mg/dL    BUN 18 8 - 23 MG/DL    Creatinine 0.99 0.8 - 1.5 MG/DL    GFR est AA >60 >60 ml/min/1.73m2    GFR est non-AA >60 >60 ml/min/1.73m2    Calcium 9.8 8.3 - 10.4 MG/DL   PROTHROMBIN TIME + INR    Collection Time: 09/01/21  7:14 PM   Result Value Ref Range    Prothrombin time 13.9 12.6 - 14.5 sec    INR 1.0         All Micro Results     None          Other Studies:  CT HEAD WO CONT    Result Date: 9/1/2021  Noncontrast CT of the brain. COMPARISON: none INDICATION: Acute neuro changes. Slurred speech and facial droop. TECHNIQUE: Contiguous axial images were obtained from the skull base through the vertex without IV contrast. Radiation dose reduction techniques were used for this study:  Our CT scanners use one or all of the following: Automated exposure control, adjustment of the mA and/or kVp according to patient's size, iterative reconstruction. FINDINGS: There is no acute intracranial hemorrhage or evidence for acute territorial infarction. There is no mass effect, midline shift or hydrocephalus. There is no extra-axial fluid collection. The cerebellum and brainstem are grossly unremarkable. Periventricular diffuse hypodensities are nonspecific and likely secondary to chronic small vessel changes. Decreased attenuation in the internal capsules, likely related to small vessel changes. Included globes appear intact. Paranasal sinuses and the mastoid air cells are aerated. There is no skull fracture. 1. No acute intracranial hemorrhage or CT evidence of acute territorial infarction. Note that MRI is more sensitive for detection of acute/subacute infarct and could be considered. 2. Periventricular chronic small vessel changes. Signed:  Liat Richmond MD    Part of this note may have been written by using a voice dictation software. The note has been proof read but may still contain some grammatical/other typographical errors.

## 2021-09-02 NOTE — CONSULTS
Consult    Patient: Davie Whitten MRN: [id number]  SSN: [id number]    YOB: 1945  Age: 77 y.o. Sex: male        Assessment:     1. Minor stroke versus transient ischemic attack. Patient has subtle alterations in communication at this time there are potentially consistent with a conduction aphasia    2. Chronic atrial fibrillation    3. Severe proximal left ICA stenosis  Hospital Problems  Date Reviewed: 5/5/2020        Codes Class Noted POA    * (Principal) Slurring of speech ICD-10-CM: R47.81  ICD-9-CM: 784.59  9/1/2021 Unknown        PAF (paroxysmal atrial fibrillation) ([facility] ICD-10-CM: I48.0  ICD-9-CM: 427.31  11/19/2018 Yes        Diabetes mellitus type 2, controlled ([facility] ICD-10-CM: E11.9  ICD-9-CM: 250.00  9/29/2016 Unknown        Dyslipidemia ICD-10-CM: E78.5  ICD-9-CM: 272.4  3/10/2016 Yes              Plan:     1. Begin Plavix 75 mg daily    2. Vascular surgery consult discussed with Belinda Brennan NP for Dr. Kemar Fountain    3. MRI of brain without contrast, urgent    Subjective:      Davie Whitten is a 77 y.o. male who is being seen for questionable TIA versus stroke. The patient has been having some difficulty with communication day before yesterday. This was persistent nausea awoke yesterday morning but had improved substantially by the time he arrived in the emergency department around 8 PM.  Patient does not appear to have been a code S but a CT and CTA head and neck were performed. Initial CT a left. Was negative however believe this patient notified by radiology of the presence of severe proximal left ICA stenosis.     Medical history is pertinent for chronic anticoagulated on Eliquis and type 2 diabetes    Past Medical History:   Diagnosis Date    Acute maxillary sinusitis     Acute pharyngitis     Aftercare for long-term (current) use of antiplatelets/antithrombotics 9/29/2016    Allergic rhinitis     Anemia 9/29/2016    Backache     CAD (coronary artery disease) 2016    Carotid artery disease (HCC) 2016    Decreased libido     Diabetes ([address] 2016    Dizziness and giddiness     ED (erectile dysfunction) 2016    Encounter for monitoring testosterone replacement therapy 2016    Fatigue     Fever blister     GERD (gastroesophageal reflux disease)     controlled with nexium    Glaucoma 2016    Hearing difficulty of right ear 2016    High cholesterol     High risk medication use 2016    Hypertension 2016    Hyponatremia 2016    IBS (irritable bowel syndrome) 2016    Intertrigo 2016    Irregular heart beat 2016    Left shoulder pain     Mouth pain     Obstructive sleep apnea of adult 2016    Osteoporosis 2016    Palpitations     Plantar wart of left foot 2016    Rotator cuff tear     Suspected Diagnosis    Sinus bradycardia 2016    Sinusitis     Skin infection     Sore throat     Special screening for malignant neoplasm of prostate     Subclinical hyperthyroidism     Tinea corporis      Past Surgical History:   Procedure Laterality Date    HX UROLOGICAL      age 17,--urethra narrowing. Caren Grumbles Caren Grumbles Had bladder infection/?incomplete emptying      Family History   Problem Relation Age of Onset    Cancer Father         hodgkin's lymphoma    Sudden Death Father         age,68, presumed MI, heavey smoker     Social History     Tobacco Use    Smoking status: Former Smoker     Quit date: 3/10/1978     Years since quittin.5    Smokeless tobacco: Never Used    Tobacco comment: quit 1978 new year[employer]   Substance Use Topics    Alcohol use:  Yes     Alcohol/week: 6.0 - 7.0 standard drinks     Types: 6 - 7 Glasses of wine per week     Comment: beer couple a day      Current Facility-Administered Medications   Medication Dose Route Frequency Provider Last Rate Last Admin    clopidogreL (PLAVIX) tablet 75 mg  75 mg Oral DAILY Horace Zavala NP        sodium chloride (NS) flush 5-40 mL  5-40 mL IntraVENous Q8H Magdi Mustafa MD   5 mL at 09/02/21 0631    sodium chloride (NS) flush 5-40 mL  5-40 mL IntraVENous PRN Magdi Mustafa MD        insulin lispro (HUMALOG) injection   SubCUTAneous AC&HS Magdi Mustafa MD   2 Units at 09/02/21 0138    rosuvastatin (CRESTOR) tablet 40 mg  40 mg Oral QHS Magdi Mustafa MD   40 mg at 09/02/21 0140    metoprolol succinate (TOPROL-XL) XL tablet 25 mg  25 mg Oral DAILY Magdi Mustafa MD   25 mg at 09/02/21 0857    apixaban (ELIQUIS) tablet 5 mg  5 mg Oral BID Magdi Mustafa MD   5 mg at 09/02/21 0858        No Known Allergies    Review of Systems: Review of Systems - General ROS: negative  Psychological ROS: negative  Ophthalmic ROS: negative  ENT ROS: negative  Allergy and Immunology ROS: negative  Hematological and Lymphatic ROS: negative  Endocrine ROS: negative  Respiratory ROS: no cough, shortness of breath, or wheezing  Cardiovascular ROS: no chest pain or dyspnea on exertion  Gastrointestinal ROS: no abdominal pain, change in bowel habits, or black or bloody stools  Genito-Urinary ROS: no dysuria, trouble voiding, or hematuria  Musculoskeletal ROS: negative  Neurological ROS: see hpi        Objective:     Vitals:    09/02/21 0036 09/02/21 0400 09/02/21 0800 09/02/21 0839   BP: (!) 164/85 (!) 153/89 (!) 143/84    Pulse: 73 61 70    Resp: 18 18 18    Temp: 97.6 °F (36.4 °C) 97.6 °F (36.4 °C) 98.2 °F (36.8 °C)    SpO2: 97% 99% 95%    Weight:       Height:    5' 8\" (1.727 m)        Physical Exam:      General: well nourished, appears stated age    Eyes: no proptosis or exophthalmos; conjunctivae clear, sclerae non-icteric    Chest: clear to auscultation    Cardiac: normal S1 S2; no murmurs gallop or rubs    Neurological:    MSE: alert, oriented times 3; very mild dysprosodic speech; confrontational naming using the language section of the NIH stroke scale is not. The patient had some difficulty with nonsense phrases.   No paraphasic errors; follows commands without difficulty    CN 2: visual fields full; no afferent pupillary defect; VA not checked; fundoscopic exam ... CN 3,4,6: Pupils symmetrical in size, reactive to light directly and consensually; no ptosis; full versions and ductions  CN 5: facial sensation intact to light touch and pin prick. Corneal reflex . .. CN 7: Symmetrical facial tone and movements  CN 8 responds to spoken voice  CN 9,10; palate symmetrical gag intact  CN 11: head turn and shoulder shrug intact  CN 12: tongue midline without atrophy or fasiculations    Motor:  Power 5/5 UE and LE proximal to distal  Fine motor movements symmetrical  Tone normal  Atrophy: absent      Cerebellar:  Finger to nose; heel to shin intact      Sensory  Romberg negative  Intact to primary modalities in all 4 extremities    Reflexes    Symmetrical and normally active at 2+ in UE and LE  Plantar response flexor bilaterally    Recent Results (from the past 24 hour(s))   CBC WITH AUTOMATED DIFF    Collection Time: 09/01/21  7:14 PM   Result Value Ref Range    WBC 6.6 4.3 - 11.1 K/uL    RBC 4.23 4.23 - 5.6 M/uL    HGB 13.8 13.6 - 17.2 g/dL    HCT 40.0 (L) 41.1 - 50.3 %    MCV 94.6 79.6 - 97.8 FL    MCH 32.6 26.1 - 32.9 PG    MCHC 34.5 31.4 - 35.0 g/dL    RDW 12.5 11.9 - 14.6 %    PLATELET 483 788 - 604 K/uL    MPV 9.7 9.4 - 12.3 FL    ABSOLUTE NRBC 0.00 0.0 - 0.2 K/uL    DF AUTOMATED      NEUTROPHILS 64 43 - 78 %    LYMPHOCYTES 21 13 - 44 %    MONOCYTES 12 4.0 - 12.0 %    EOSINOPHILS 2 0.5 - 7.8 %    BASOPHILS 1 0.0 - 2.0 %    IMMATURE GRANULOCYTES 0 0.0 - 5.0 %    ABS. NEUTROPHILS 4.3 1.7 - 8.2 K/UL    ABS. LYMPHOCYTES 1.4 0.5 - 4.6 K/UL    ABS. MONOCYTES 0.8 0.1 - 1.3 K/UL    ABS. EOSINOPHILS 0.1 0.0 - 0.8 K/UL    ABS. BASOPHILS 0.0 0.0 - 0.2 K/UL    ABS. IMM.  GRANS. 0.0 0.0 - 0.5 K/UL   METABOLIC PANEL, BASIC    Collection Time: 09/01/21  7:14 PM   Result Value Ref Range    Sodium 131 (L) 136 - 145 mmol/L    Potassium 4.5 3.5 - 5.1 mmol/L Chloride 99 98 - 107 mmol/L    CO2 27 21 - 32 mmol/L    Anion gap 5 (L) 7 - 16 mmol/L    Glucose 141 (H) 65 - 100 mg/dL    BUN 18 8 - 23 MG/DL    Creatinine 0.99 0.8 - 1.5 MG/DL    GFR est AA >60 >60 ml/min/1.73m2    GFR est non-AA >60 >60 ml/min/1.73m2    Calcium 9.8 8.3 - 10.4 MG/DL   PROTHROMBIN TIME + INR    Collection Time: 09/01/21  7:14 PM   Result Value Ref Range    Prothrombin time 13.9 12.6 - 14.5 sec    INR 1.0     COVID-19 RAPID TEST    Collection Time: 09/01/21 10:59 PM   Result Value Ref Range    Specimen source NASAL      COVID-19 rapid test Not detected NOTD     SARS-COV-2    Collection Time: 09/01/21 10:59 PM   Result Value Ref Range    SARS-CoV-2 Please find results under separate order     SARS-COV-2, PCR    Collection Time: 09/01/21 10:59 PM    Specimen: Nasopharyngeal   Result Value Ref Range    Specimen source Nasopharyngeal      SARS-CoV-2 Not detected NOTD     GLUCOSE, POC    Collection Time: 09/02/21  1:21 AM   Result Value Ref Range    Glucose (POC) 152 (H) 65 - 100 mg/dL    Performed by Yoselin    LIPID PANEL    Collection Time: 09/02/21  4:19 AM   Result Value Ref Range    Cholesterol, total 126 <200 MG/DL    Triglyceride 54 35 - 150 MG/DL    HDL Cholesterol 53 40 - 60 MG/DL    LDL, calculated 62.2 <100 MG/DL    VLDL, calculated 10.8 6.0 - 23.0 MG/DL    CHOL/HDL Ratio 2.4     HEMOGLOBIN A1C WITH EAG    Collection Time: 09/02/21  4:19 AM   Result Value Ref Range    Hemoglobin A1c 6.6 (H) 4.2 - 6.3 %    Est. average glucose 143 mg/dL   GLUCOSE, POC    Collection Time: 09/02/21  6:15 AM   Result Value Ref Range    Glucose (POC) 85 65 - 100 mg/dL    Performed by DearmanLiv    TSH 3RD GENERATION    Collection Time: 09/02/21  8:37 AM   Result Value Ref Range    TSH 0.686 0.358 - 3.740 uIU/mL   T4, FREE    Collection Time: 09/02/21  8:37 AM   Result Value Ref Range    T4, Free 1.2 0.78 - 4.19 NG/DL   METABOLIC PANEL, COMPREHENSIVE    Collection Time: 09/02/21  8:37 AM Result Value Ref Range    Sodium 132 (L) 138 - 145 mmol/L    Potassium 4.3 3.5 - 5.1 mmol/L    Chloride 101 98 - 107 mmol/L    CO2 25 21 - 32 mmol/L    Anion gap 6 (L) 7 - 16 mmol/L    Glucose 153 (H) 65 - 100 mg/dL    BUN 14 8 - 23 MG/DL    Creatinine 0.81 0.8 - 1.5 MG/DL    GFR est AA >60 >60 ml/min/1.73m2    GFR est non-AA >60 >60 ml/min/1.73m2    Calcium 9.3 8.3 - 10.4 MG/DL    Bilirubin, total 0.6 0.2 - 1.1 MG/DL    ALT (SGPT) 22 12 - 65 U/L    AST (SGOT) 19 15 - 37 U/L    Alk. phosphatase 91 50 - 136 U/L    Protein, total 6.7 6.3 - 8.2 g/dL    Albumin 3.7 3.2 - 4.6 g/dL    Globulin 3.0 2.3 - 3.5 g/dL    A-G Ratio 1.2 1.2 - 3.5         Lab Results   Component Value Date/Time    Cholesterol, total 126 09/02/2021 04:19 AM    HDL Cholesterol 53 09/02/2021 04:19 AM    LDL, calculated 62.2 09/02/2021 04:19 AM    VLDL, calculated 10.8 09/02/2021 04:19 AM    Triglyceride 54 09/02/2021 04:19 AM    CHOL/HDL Ratio 2.4 09/02/2021 04:19 AM        Lab Results   Component Value Date/Time    Hemoglobin A1c 6.6 (H) 09/02/2021 04:19 AM    Hemoglobin A1c (POC) 6.2 (A) 09/11/2017 08:50 AM    Hemoglobin A1c, External 6.2 09/06/2016 12:00 AM        CT Results (most recent):  Results from Hospital Encounter encounter on 09/01/21    CTA HEAD NECK W WO CONT    Narrative  History: Slurred speech. Facial droop. Comments: CT ANGIOGRAM OF THE NECK AND CT ANGIOGRAM OF THE [place] was  obtained following the administration of IV contrast. IV contrast was  administered to evaluate the arterial vasculature. Reformatted images in the  coronal and sagittal planes as well as 3-D imaging was obtained and reviewed on  a dedicated PACS and [address]. Radiation reduction dose techniques  were used for the study. Our CT scanner use one or all of the following-  Automated exposure control, adjustment of the mA and/or KV according the patient  size, iterative reconstruction.  All measurements are based upon NASCET criteria  if appropriate. Findings:    CT ANGIOGRAM OF THE NECK:    The arch and proximal great vessels are patent with mild atherosclerotic  changes. The right carotid bulb demonstrates concentric calcified plaque disease  however degree of stenosis is less than 50%. The left carotid bulb demonstrates  eccentric calcified plaque disease. Degree of stenosis is less than 50% however  the proximal left internal carotid artery demonstrates near occlusive short  segment stenosis. The vessel reconstitutes shortly thereafter. The internal  carotid arteries are tortuous. Atherosclerotic changes of the vertebral arteries are present with probable  significant narrowing at the origin on the left. The right is dominant. The lung apices are clear. Thyroid gland is unremarkable. CT ANGIOGRAM OF [place]:    The petrous, cavernous, and supraclinoid internal carotid arteries are patent  with atherosclerotic changes in the supraclinoid portions. The A1 segment of the  anterior circulation on the left is diminutive in size. The middle cerebral arteries are patent. The distal vertebral arteries demonstrate atherosclerotic changes at the V4  segment on the right. The left is diminutive. The basilar artery and posterior cerebral arteries are patent. The dural venous sinuses are patent. Impression  1. Short segment near occlusive stenosis of the proximal left internal carotid  artery. The vessel reconstitutes flow shortly thereafter. This finding was relayed to the patient's nurse upon interpretation      No results found for this or any previous visit. MRI Results (most recent):  Results from [place] encounter on 09/02/16    MRI SHOULDER LT WO CONT    Narrative  MRI OF THE LEFT SHOULDER WITHOUT CONTRAST.     Clinical Indication: Severe left shoulder pain for six months with decreased  range of motion    Procedure: Multiplanar and multisequence MR images of the left shoulder were  performed without gadolinium contrast.    Comparison: No prior    Findings:    AC joint: Moderate degenerative [facility]. The  humeral head is high riding and abuts the undersurface of the lateral acromion. Fluid signal is present in the subacromial/subdeltoid bursa. Rotator cuff: A massive rotator cuff tendon tear is appreciated with full  thickness tearing noted of the supraspinatus and infraspinatus tendons. The torn  tendon fibers are retracted roughly 5 cm proximal. There is no significant fatty  atrophy. The teres minor tendon is intact. The subscapularis tendon is intact. Biceps labral complex: The long head of biceps tendon is intact. The superior  labrum is intact. The joint capsule in the axillary recess is intact. Glenohumeral joint: The marrow signal in the humeral head and glenoid is normal.  No definite anterior or posterior labral tear appreciated. No focal chondral  defect evident. No abnormal soft tissue mass identified. Impression  IMPRESSION:  1. Massive rotator cuff tendon tear with full thickness tearing noted of the  supraspinatus and infraspinatus tendons. The torn tendon fibers are retracted  proximally roughly 5 cm. There is no atrophy. 2. Moderate degenerative hypertrophy the AC joint. Humeral head is high riding  and abuts the undersurface of the lateral acromion. US Results (most recent):  No results found for this or any previous visit. Most recent CTA  Results from [place] encounter on 09/01/21    CTA HEAD NECK W WO CONT    Narrative  History: Slurred speech. Facial droop. Comments: CT ANGIOGRAM OF THE NECK AND CT ANGIOGRAM OF THE [place] was  obtained following the administration of IV contrast. IV contrast was  administered to evaluate the arterial vasculature. Reformatted images in the  coronal and sagittal planes as well as 3-D imaging was obtained and reviewed on  a dedicated PACS and [address]. Radiation reduction dose techniques  were used for the study. Our CT scanner use one or all of the following-  Automated exposure control, adjustment of the mA and/or KV according the patient  size, iterative reconstruction. All measurements are based upon NASCET criteria  if appropriate. Findings:    CT ANGIOGRAM OF THE NECK:    The arch and proximal great vessels are patent with mild atherosclerotic  changes. The right carotid bulb demonstrates concentric calcified plaque disease  however degree of stenosis is less than 50%. The left carotid bulb demonstrates  eccentric calcified plaque disease. Degree of stenosis is less than 50% however  the proximal left internal carotid artery demonstrates near occlusive short  segment stenosis. The vessel reconstitutes shortly thereafter. The internal  carotid arteries are tortuous. Atherosclerotic changes of the vertebral arteries are present with probable  significant narrowing at the origin on the left. The right is dominant. The lung apices are clear. Thyroid gland is unremarkable. CT ANGIOGRAM OF [place]:    The petrous, cavernous, and supraclinoid internal carotid arteries are patent  with atherosclerotic changes in the supraclinoid portions. The A1 segment of the  anterior circulation on the left is diminutive in size. The middle cerebral arteries are patent. The distal vertebral arteries demonstrate atherosclerotic changes at the V4  segment on the right. The left is diminutive. The basilar artery and posterior cerebral arteries are patent. The dural venous sinuses are patent. Impression  1. Short segment near occlusive stenosis of the proximal left internal carotid  artery. The vessel reconstitutes flow shortly thereafter.     This finding was relayed to the patient's nurse upon interpretation      Most recent MRI        Signed By: Maryam Cobian MD     September 2, 2021

## 2021-09-02 NOTE — PROGRESS NOTES
OBSERVATION STATUS  LTG: Patient will tolerate least restrictive diet without overt signs or symptoms of airway compromise. STG: Patient will tolerate regular diet and thin liquids without overt signs or symptoms of airway compromise. STG: Patient will participate in modified barium swallow study as clinically indicated. SPEECH LANGUAGE PATHOLOGY: DYSPHAGIA- Initial Assessment    NAME/AGE/GENDER: Ifeoma Mondragon is a 77 y.o. male  DATE: 9/2/2021  PRIMARY DIAGNOSIS: Slurring of speech [R47.81]      ICD-10: Treatment Diagnosis: R13.12 Dysphagia, Oropharyngeal Phase    RECOMMENDATIONS   DIET:    Regular Consistency   Thin Liquids    MEDICATIONS: With liquid     PRECAUTIONS, MODIFICATIONS, AND STRATEGIES  · Slow rate of intake  · Small bites/sips  · Upright at 90 degrees during meal       RECOMMENDATIONS FOR CONTINUED SPEECH THERAPY:   YES: Anticipate need for ongoing speech therapy during this hospitalization and at next level of care. ASSESSMENT   Patient presents with overall functional oropharyngeal swallow as evidenced by timely prep/transit and no immediate s/sx aspiration. Patient did demonstrate Delayed cough following cracker but appeared related to coarse texture. No s/sx aspiration with liquid wash. Recommend continue regular diet and thin liquids. Will follow up for tolerance. Anticipate cognitive linguistic evaluation. EDUCATION:  · Recommendations discussed with Patient and MD    REHABILITATION POTENTIAL FOR STATED GOALS: Excellent    PLAN    FREQUENCY/DURATION:   Continue to follow patient 2 times a week for duration of hospital stay to address above goals. Recommendations for next treatment session: Next treatment session will address diet tolerance    CONTINUATION OF SKILLED SERVICES/MEDICAL NECESSITY:   Patient is expected to demonstrate progress in  diet tolerance and swallow safety in order to  improve swallow safety and decrease aspiration risk.    Patient continues to require skilled intervention due to dysphagia. SUBJECTIVE   Upright in chair, alert and pleasant. finishing with OT. Reports has had mucous in his throat. Oxygen Device: room air  Pain: Pain Scale 1: Numeric (0 - 10)  Pain Intensity 1: 0    History of Present Injury/Illness: Mr. Leeanne Collins  has a past medical history of Acute maxillary sinusitis, Acute pharyngitis, Aftercare for long-term (current) use of antiplatelets/antithrombotics (9/29/2016), Allergic rhinitis, Anemia (9/29/2016), Backache, CAD (coronary artery disease) (9/29/2016), Carotid artery disease ([facility] [address] (9/29/2016), Decreased libido, Diabetes ([facility] [address] (9/29/2016), Dizziness and giddiness, ED (erectile dysfunction) (9/29/2016), Encounter for monitoring testosterone replacement therapy (9/29/2016), Fatigue, Fever blister, GERD (gastroesophageal reflux disease), Glaucoma (9/29/2016), Hearing difficulty of right ear (9/29/2016), High cholesterol, High risk medication use (9/29/2016), Hypertension (9/29/2016), Hyponatremia (9/29/2016), IBS (irritable bowel syndrome) (9/29/2016), Intertrigo (9/29/2016), Irregular heart beat (9/29/2016), Left shoulder pain, Mouth pain, Obstructive sleep apnea of adult (9/29/2016), Osteoporosis (9/29/2016), Palpitations, Plantar wart of left foot (9/29/2016), Rotator cuff tear, Sinus bradycardia (9/29/2016), Sinusitis, Skin infection, Sore throat, Special screening for malignant neoplasm of prostate, Subclinical hyperthyroidism, and Tinea corporis. Sasha Canas He also  has a past surgical history that includes hx urological. PRECAUTIONS/ALLERGIES: Patient has no known allergies. Problem List:  (Impairments causing functional limitations):  1.  CVA workup    Previous Dysphagia: NONE REPORTED  Diet Prior to Evaluation: regular/thin    Orientation:  Person  Place  Time  Situation    Cognitive-Linguistic Screening:   Alertness  o Alert   Speech Production:   o low volume-patient reports at baseline(no awareness of deficits reported by family yesterday, reports he thought he sounded normal)   Expressive Language:  o Word finding deficits noted. able to communicate wants/needs   Receptive Language:  o able to follow basic commands. difficulty with more complex/higher level comprehension; difficulty following conversation at times.  Cognition: decreased thought organization/loses train of thought. Prior Level of Function: independent. lives with wife and son lives with them. Retired from [employer] Recommendations: Given results of screening, further evaluation is indicated to assess cognitive linguistic evaluation. OBJECTIVE   Oral Motor:   · Labial: No impairment  · Dentition: Intact  · Oral Hygiene: Adequate  · Lingual: slight left asymmetry with smile    Dysphagia evaluation:   Patient presented with thin liquid via cup and straw, puree, mixed, and solid consistencies. Appropriate oral prep with all textures. Impulsive with rate/bit size. Timely swallow initiation, and single swallows upon palpation. Adequate oral clearing. Delayed dry cough after coarse cracker. No other s/sx aspiration. Vocal quality clear after serial straw gulps.         Tool Used: Dysphagia Outcome and Severity Scale (ROSEMARY)    Score Comments   Normal Diet  [] 7 With no strategies or extra time needed   Functional Swallow  [] 6 May have mild oral or pharyngeal delay   Mild Dysphagia  [] 5 Which may require one diet consistency restricted    Mild-Moderate Dysphagia  [] 4 With 1-2 diet consistencies restricted   Moderate Dysphagia  [] 3 With 2 or more diet consistencies restricted   Moderate-Severe Dysphagia  [] 2 With partial PO strategies (trials with ST only)   Severe Dysphagia  [] 1 With inability to tolerate any PO safely      Score:  Initial: 6 Most Recent: x (Date 09/02/21 )   Interpretation of Tool: The Dysphagia Outcome and Severity Scale (ROSEMARY) is a simple, easy-to-use, 7-point scale developed to systematically rate the functional severity of dysphagia based on objective assessment and make recommendations for diet level, independence level, and type of nutrition. Current Medications:   No current facility-administered medications on file prior to encounter. Current Outpatient Medications on File Prior to Encounter   Medication Sig Dispense Refill    metoprolol succinate (TOPROL-XL) 25 mg XL tablet Take 1 Tablet by mouth daily. 30 Tablet 5    flecainide (TAMBOCOR) 100 mg tablet Take 1 Tablet by mouth two (2) times a day. 180 Tablet 3    losartan (COZAAR) 50 mg tablet Take 1 Tab by mouth daily. 90 Tab 3    apixaban (ELIQUIS) 5 mg tablet Take 1 Tab by mouth two (2) times a day. 180 Tab 3    magnesium oxide (MAG-OX) 400 mg tablet Take 1 Tab by mouth two (2) times a day. 180 Tab 3    Blood Pressure Monitor (BLOOD PRESSURE KIT) kit Check BP daily. 1 Kit 0    rosuvastatin (CRESTOR) 40 mg tablet Take 1 Tab by mouth nightly. 90 Tab 1    latanoprost (XALATAN) 0.005 % ophthalmic solution USE 1 DROP IN BOTH EYES EVERY EVENING  4    omega 3-dha-epa-fish oil (FISH OIL) 900-1,400 mg cpDR Take 2 Caps by mouth daily for 90 days. Last dose 01/14/10   Indications: ARTERIOSCLEROTIC VASCULAR DISEASE PREVENTION 180 Cap 3    calcium citrate-vitamin D3 (CITRACAL + D) tablet One am(with centrum) and two pm   Indications: OSTEOPOROSIS 90 Tab 3    timolol (TIMOPTIC) 0.25 % ophthalmic solution Administer 1 Drop to both eyes daily.  multivitamin (ONE A DAY) tablet Take 1 Tab by mouth daily.  Centrum silver last dose 01/14/10           INTERDISCIPLINARY COLLABORATION: RN    After treatment position/precautions:  · Upright in chair  · Call light within reach  · CNA notified    Total Treatment Duration:   Time In: 6389  Time Out: 6412 [facility] [address] [address] [facility]

## 2021-09-02 NOTE — PROGRESS NOTES
Physical Therapy Note:    Physical therapy evaluation orders received and chart reviewed. Attempted to see patient this AM to initiate assessment. Patient currently off of the floor for testing (MRI). Will follow and re-attempt at a later time/date as schedule permits/patient available.  Thank you,    Neno Russell, PT, DPT

## 2021-09-02 NOTE — PROGRESS NOTES
CM spoke with patient for discharge planning needs. Demographics verified. Patient lives with spouse. He states at baseline he is independent with ADLs, ambulates without assistive devices and drives. No DME. PCP is Dr. Karissa Toro. Patient verbalizes no difficulties obtaining medications. He uses [address], [place]. CM will follow to assist with discharge needs that may arise. Care Management Interventions  PCP Verified by CM:  Yes (Dr. Karissa Toro)  Mode of Transport at Discharge: Self  Transition of Care Consult (CM Consult): Discharge Planning  Discharge Durable Medical Equipment: No  Physical Therapy Consult: Yes  Occupational Therapy Consult: Yes  Speech Therapy Consult: Yes  Current Support Network: Own Home, Lives with Spouse  Confirm Follow Up Transport: Family  Discharge Location  Discharge Placement: Home with family assistance

## 2021-09-02 NOTE — PROGRESS NOTES
09/02/21 0719   NIH Stroke Scale   Interval Handoff/Transfer  (Dual w/ Cherise Degroot)   Level of Conciousness (1a) 0   LOC Questions (1b) 0   LOC Commands (1c) 0   Best Gaze (2) 0   Visual (3) 0   Facial Palsy (4) 0   Motor Arm, Left (5a) 0   Motor Arm, Right (5b) 0   Motor Leg, Left (6a) 0   Motor Leg, Right (6b) 0   Limb Ataxia (7) 0   Sensory (8) 0   Best Language (9) 0   Dysarthria (10) 0   Extinction and Inattention (11) 0   Total 0   Dual w/ JOAN Bhat

## 2021-09-02 NOTE — ED PROVIDER NOTES
History comes mainly from the patient and from a daughter-in-law. He has not been entirely his normal baseline since yesterday evening. This is persisted from when he awoke this morning and through today. He is less mentally clear than he traditionally is his speech is somewhat slurred and facial features seem to have some droop. Unaware of fever. No falls or head injuries. No history of stroke events. Unaware of any fever or acute infectious changes associated. Daughter-in-law says that he is not been at his normal level of function today and his speech was somewhat slurred and hesitant. Of note she is a speech therapist    The history is provided by the patient. Facial Droop  This is a new problem. The problem has been resolved. There was left facial focality noted. Primary symptoms include slurred speech and mental status change. Pertinent negatives include no loss of sensation, no loss of balance, no movement disorder and no agitation. There has been no fever. Pertinent negatives include no chest pain, no altered mental status, no confusion, no choking, no nausea, no bowel incontinence and no bladder incontinence. Associated medical issues do not include trauma or seizures.         Past Medical History:   Diagnosis Date    Acute maxillary sinusitis     Acute pharyngitis     Aftercare for long-term (current) use of antiplatelets/antithrombotics 9/29/2016    Allergic rhinitis     Anemia 9/29/2016    Backache     CAD (coronary artery disease) 9/29/2016    Carotid artery disease ([facility] [address] 9/29/2016    Decreased libido     Diabetes ([facility] [address] 9/29/2016    Dizziness and giddiness     ED (erectile dysfunction) 9/29/2016    Encounter for monitoring testosterone replacement therapy 9/29/2016    Fatigue     Fever blister     GERD (gastroesophageal reflux disease)     controlled with nexium    Glaucoma 9/29/2016    Hearing difficulty of right ear 9/29/2016    High cholesterol     High risk medication use 2016    Hypertension 2016    Hyponatremia 2016    IBS (irritable bowel syndrome) 2016    Intertrigo 2016    Irregular heart beat 2016    Left shoulder pain     Mouth pain     Obstructive sleep apnea of adult 2016    Osteoporosis 2016    Palpitations     Plantar wart of left foot 2016    Rotator cuff tear     Suspected Diagnosis    Sinus bradycardia 2016    Sinusitis     Skin infection     Sore throat     Special screening for malignant neoplasm of prostate     Subclinical hyperthyroidism     Tinea corporis        Past Surgical History:   Procedure Laterality Date    HX UROLOGICAL      age 17,--urethra narrowing. Vargas Couch Vargas Adams Had bladder infection/?incomplete emptying         Family History:   Problem Relation Age of Onset    Cancer Father         hodgkin's lymphoma    Sudden Death Father         age,68, presumed MI, heavey smoker       Social History     Socioeconomic History    Marital status:      Spouse name: Not on file    Number of children: Not on file    Years of education: Not on file    Highest education level: Not on file   Occupational History    Not on file   Tobacco Use    Smoking status: Former Smoker     Quit date: 3/10/1978     Years since quittin.5    Smokeless tobacco: Never Used    Tobacco comment: quit 1978 new year[employer]   Substance and Sexual Activity    Alcohol use:  Yes     Alcohol/week: 6.0 - 7.0 standard drinks     Types: 6 - 7 Glasses of wine per week     Comment: beer couple a day    Drug use: No    Sexual activity: Yes     Partners: Female   Other Topics Concern    Not on file   Social History Narrative    Not on file     Social Determinants of Health     Financial Resource Strain:     Difficulty of Paying Living Expenses:    Food Insecurity:     Worried About Running Out of Food in the Last Year:     Ran Out of Food in the Last Year:    Transportation Needs:     Lack of Transportation (Medical):  Lack of Transportation (Non-Medical):    Physical Activity:     Days of Exercise per Week:     Minutes of Exercise per Session:    Stress:     Feeling of Stress :    Social Connections:     Frequency of Communication with Friends and Family:     Frequency of Social Gatherings with Friends and Family:     Attends Spiritism Services:     Active Member of Clubs or Organizations:     Attends Club or Organization Meetings:     Marital Status:    Intimate Partner Violence:     Fear of Current or Ex-Partner:     Emotionally Abused:     Physically Abused:     Sexually Abused: ALLERGIES: Patient has no known allergies. Review of Systems   Constitutional: Negative for chills, fever and unexpected weight change. Respiratory: Negative for cough and choking. Cardiovascular: Negative for chest pain. Gastrointestinal: Negative for bowel incontinence and nausea. Genitourinary: Negative for bladder incontinence. Neurological: Negative for loss of balance. Psychiatric/Behavioral: Negative for agitation and confusion. All other systems reviewed and are negative. Vitals:    09/01/21 1904   BP: (!) 147/79   Pulse: 75   Resp: 16   Temp: 98.6 °F (37 °C)   SpO2: 95%   Weight: 88.5 kg (195 lb)            Physical Exam  Vitals and nursing note reviewed. Constitutional:       General: He is not in acute distress. Appearance: He is well-developed. HENT:      Head: Atraumatic. Right Ear: External ear normal.      Left Ear: External ear normal.      Mouth/Throat:      Mouth: Mucous membranes are moist.   Eyes:      General: No scleral icterus. Cardiovascular:      Rate and Rhythm: Normal rate. Pulmonary:      Effort: Pulmonary effort is normal. No respiratory distress. Abdominal:      Palpations: Abdomen is soft. Tenderness: There is no abdominal tenderness. There is no right CVA tenderness, left CVA tenderness or guarding.    Musculoskeletal:         General: No signs of injury. Cervical back: Neck supple. Right lower leg: No edema. Left lower leg: No edema. Skin:     General: Skin is warm and dry. Neurological:      Mental Status: Mental status is at baseline. Psychiatric:         Thought Content: Thought content normal.          MDM  Number of Diagnoses or Management Options  Diagnosis management comments: Patient with some speech changes and family report of earlier facial droop. He also has a change in affect. No overt fever. No cough hypoxia or typical Covid symptoms. Symptoms began on the evening prior. Facial changes have improved but some speech hesitancy remains. A teleneurology consult has been undertaken. They advised to admit though feel so present blood pressure and other functional levels should be left without addressing.        Amount and/or Complexity of Data Reviewed  Clinical lab tests: ordered and reviewed  Tests in the radiology section of CPT®: reviewed and ordered  Obtain history from someone other than the patient: yes (Discussed with daughter-in-law who is a speech therapist she went over but she had noticed as far as speech changes some slight facial weakness)  Review and summarize past medical records: yes  Discuss the patient with other providers: yes (Discussed directly with teleneurologist  Have consulted hospitalist and they will admit)  Independent visualization of images, tracings, or specimens: yes    Risk of Complications, Morbidity, and/or Mortality  Presenting problems: moderate  Diagnostic procedures: moderate  Management options: moderate           Procedures

## 2021-09-02 NOTE — PROGRESS NOTES
ACUTE OT GOALS:  (Developed with and agreed upon by patient and/or caregiver.)  1. Pt will toilet independently (MET)  2. Pt will complete functional mobility for ADLs with SBA (MET)  3. Pt will complete lower body dressing independently (MET)  4. Pt will complete grooming and hygiene at sink independently (MET)      Timeframe: 7 days      OCCUPATIONAL THERAPY ASSESSMENT: Initial Assessment and Discharge OT Treatment Day # 1    Korina Kraus is a 77 y.o. male   PRIMARY DIAGNOSIS: Slurring of speech  Slurring of speech [R47.81]       Reason for Referral:  Slurred speech  ICD-10: Treatment Diagnosis: Other lack of cordination (R27.8)  OBSERVATION: Payor: Carmen Jang / Plan: [address] [place] [address] HMO / Product Type: [employer] /   ASSESSMENT:     REHAB RECOMMENDATIONS:   Recommendation to date pending progress:  Setting:   No further skilled therapy   Equipment:    None     PRIOR LEVEL OF FUNCTION:  (Prior to Hospitalization)  INITIAL/CURRENT LEVEL OF FUNCTION:  (Based on today's evaluation)   Bathing:   Independent  Dressing:   Independent  Feeding/Grooming:   Independent  Toileting:   Independent  Functional Mobility:   Independent Bathing:   Independent  Dressing:   Independent  Feeding/Grooming:   Independent  Toileting:   Independent  Functional Mobility:   Standby Assistance     ASSESSMENT:  Mr. Denver Flint was admitted with facial droop, slurred speech. Pt presented with mild communication/ cognitive deficits, unsure acute vs baseline. Pt demonstrated independence with ADLs and did not demonstrate functional deficits. Pt does not require further skilled OT services at this time, no d/c needs. SUBJECTIVE:   Mr. Denver Flint states, \"I'm ready to go home. \"    SOCIAL HISTORY/LIVING ENVIRONMENT: Independent, lives w/ wife       OBJECTIVE:     PAIN: VITAL SIGNS: LINES/DRAINS:   Pre Treatment: Pain Screen  Pain Scale 1: Numeric (0 - 10)  Pain Intensity 1: 0  Post Treatment: 0   O2 Device: None (Room air)     GROSS EVALUATION:  BUE Within Functional Limits Abnormal/ Functional Abnormal/ Non-Functional (see comments) Not Tested Comments:   AROM [x] [] [] []    PROM [] [] [] []    Strength [x] [] [] []    Balance [] [x] [] []    Posture [] [] [] []    Sensation [] [] [] []    Coordination [x] [] [] []    Tone [] [] [] []    Edema [] [] [] []    Activity Tolerance [x] [] [] []     [] [] [] []      COGNITION/  PERCEPTION: Intact Impaired   (see comments) Comments:   Orientation [x] []    Vision [x] []    Hearing [x] []    Judgment/ Insight [] [x]    Attention [] [x]    Memory [] [x]    Command Following [x] []    Emotional Regulation [x] []     [] []      ACTIVITIES OF DAILY LIVING: I Mod I S SBA CGA Min Mod Max Total NT Comments   BASIC ADLs:              Bathing/ Showering [] [] [] [] [] [] [] [] [] []    Toileting [x] [] [] [] [] [] [] [] [] [] Standing in bathroom   Dressing [x] [] [] [] [] [] [] [] [] [] Donned pants and socks   Feeding [] [] [] [] [] [] [] [] [] []    Grooming [x] [] [] [] [] [] [] [] [] []    Personal Device Care [] [] [] [] [] [] [] [] [] []    Functional Mobility [] [] [] [x] [] [] [] [] [] []    I=Independent, Mod I=Modified Independent, S=Supervision, SBA=Standby Assistance, CGA=Contact Guard Assistance,   Min=Minimal Assistance, Mod=Moderate Assistance, Max=Maximal Assistance, Total=Total Assistance, NT=Not Tested    MOBILITY: I Mod I S SBA CGA Min Mod Max Total  NT x2 Comments:   Supine to sit [] [] [x] [] [] [] [] [] [] [] []    Sit to supine [] [] [x] [] [] [] [] [] [] [] []    Sit to stand [] [] [x] [] [] [] [] [] [] [] []    Bed to chair [] [] [x] [] [] [] [] [] [] [] []    I=Independent, Mod I=Modified Independent, S=Supervision, SBA=Standby Assistance, CGA=Contact Guard Assistance,   Min=Minimal Assistance, Mod=Moderate Assistance, Max=Maximal Assistance, Total=Total Assistance, NT=Not Tested    [address] [place] [address] [address] [id number] AM-PAC 6 Clicks   Daily Activity Inpatient Short Form How much help from another person does the patient currently need. .. Total A Lot A Little None   1. Putting on and taking off regular lower body clothing? [] 1   [] 2   [] 3   [x] 4   2. Bathing (including washing, rinsing, drying)? [] 1   [] 2   [] 3   [x] 4   3. Toileting, which includes using toilet, bedpan or urinal?   [] 1   [] 2   [] 3   [x] 4   4. Putting on and taking off regular upper body clothing? [] 1   [] 2   [] 3   [x] 4   5. Taking care of personal grooming such as brushing teeth? [] 1   [] 2   [] 3   [x] 4   6. Eating meals? [] 1   [] 2   [] 3   [x] 4   © 2007, Trustees of [address] [place] [address] [address] [address], under license to [employer]. All rights reserved     Score:  Initial: 24 Most Recent: X (Date: -- )   Interpretation of Tool:  Represents activities that are increasingly more difficult (i.e. Bed mobility, Transfers, Gait).         TREATMENT:     EVALUATION: Low Complexity : (Untimed Charge)      AFTER TREATMENT POSITION/PRECAUTIONS:  Alarm Activated, Chair, Needs within reach and RN notified    INTERDISCIPLINARY COLLABORATION:  RN/PCT    TOTAL TREATMENT DURATION:  OT Patient Time In/Time Out  Time In: 9813  Time Out: 451 [address]ame[address]

## 2021-09-02 NOTE — ED NOTES
Initial NIH score is either 0 or 1 depending on whether speech slurring is included since it is subtle

## 2021-09-02 NOTE — PROGRESS NOTES
09/02/21 0036   NIH Stroke Scale   Interval Other (comment)  (Admission to floor, Dual w/ Sabina)   Level of Conciousness (1a) 0   LOC Questions (1b) 0   LOC Commands (1c) 0   Best Gaze (2) 0   Visual (3) 0   Facial Palsy (4) 0   Motor Arm, Left (5a) 0   Motor Arm, Right (5b) 0   Motor Leg, Left (6a) 0   Motor Leg, Right (6b) 0   Limb Ataxia (7) 0   Sensory (8) 0   Best Language (9) 0   Dysarthria (10) 0   Extinction and Inattention (11) 0   Total 0   Dual w/ Cookie Lopez RN on admission

## 2021-09-02 NOTE — ED NOTES
Daughter-in-law gave me a  for his hearing aids and plastic bag this was handed directly to the patient

## 2021-09-02 NOTE — PROGRESS NOTES
09/02/21 2406   NIH Stroke Scale   Interval Handoff/Transfer  ([facility] with Geoffersharri Woods, JOAN )   Level of Conciousness (1a) 0   LOC Questions (1b) 0   LOC Commands (1c) 0   Best Gaze (2) 0   Visual (3) 0   Facial Palsy (4) 0   Motor Arm, Left (5a) 0   Motor Arm, Right (5b) 0   Motor Leg, Left (6a) 0   Motor Leg, Right (6b) 0   Limb Ataxia (7) 0   Sensory (8) 0   Best Language (9) 0   Dysarthria (10) 0   Extinction and Inattention (11) 0   Total 0

## 2021-09-02 NOTE — PROGRESS NOTES
TRANSFER - IN REPORT:    Verbal report received from Donaldo Esqueda RN on [facility]  being received from ED for routine progression of care      Report consisted of patients Situation, Background, Assessment and   Recommendations(SBAR). Information from the following report(s) SBAR was reviewed with the receiving nurse. Opportunity for questions and clarification was provided. Assessment completed upon patients arrival to unit and care assumed.

## 2021-09-02 NOTE — ROUTINE PROCESS
Discussed admission with Dr Anuja Andre. Rapid covid swab negative. PCR ordered in ER. Per MD, Amy Ha is not suspected and patient does not have any respiratory symptoms. Due to low suspicion, patient may go to medical floor.

## 2021-09-02 NOTE — PROGRESS NOTES
09/02/21 0036   Dual Skin Pressure Injury Assessment   Dual Skin Pressure Injury Assessment WDL   Second Care Provider (Based on [address] [place] [address]) Los jose, RN   Skin Integumentary   Skin Integumentary (WDL) WDL    Pressure  Injury Documentation No Pressure Injury Noted-Pressure Ulcer Prevention Initiated   Skin Color Appropriate for ethnicity   Skin Condition/Temp Warm;Dry;Fragile   Skin Integrity Scars (comment); Abrasion  (abrassion RLE)   Turgor Non-tenting   Hair Growth Present   Nails X   Varicosities Absent   Exceptions to WDL Thick   Wound Prevention and Protection Methods   Orientation of Wound Prevention Posterior   Location of Wound Prevention Sacrum/Coccyx   Dressing Present  No   Wound Offloading (Prevention Methods) Bed, pressure reduction mattress   Dual w/ Sarwat garcia RN  Forehead with telfa and tape, pt states biopsy site from dermatologist.

## 2021-09-03 ENCOUNTER — APPOINTMENT (OUTPATIENT)
Dept: NON INVASIVE DIAGNOSTICS | Age: 76
DRG: 065 | End: 2021-09-03
Attending: HOSPITALIST
Payer: MEDICARE

## 2021-09-03 VITALS
BODY MASS INDEX: 29.55 KG/M2 | OXYGEN SATURATION: 95 % | TEMPERATURE: 98 F | RESPIRATION RATE: 16 BRPM | HEIGHT: 68 IN | DIASTOLIC BLOOD PRESSURE: 79 MMHG | SYSTOLIC BLOOD PRESSURE: 139 MMHG | WEIGHT: 195 LBS | HEART RATE: 69 BPM

## 2021-09-03 PROBLEM — I65.22 LEFT CAROTID ARTERY STENOSIS: Status: ACTIVE | Noted: 2021-09-03

## 2021-09-03 LAB
ECHO AO ASC DIAM: 3.4 CM
ECHO AO ROOT DIAM: 3.5 CM
ECHO AV AREA PEAK VELOCITY: 2.51 CM2
ECHO AV AREA VTI: 3.53 CM2
ECHO AV AREA/BSA PEAK VELOCITY: 1.2 CM2/M2
ECHO AV AREA/BSA VTI: 1.7 CM2/M2
ECHO AV MEAN GRADIENT: 4 MMHG
ECHO AV PEAK GRADIENT: 6 MMHG
ECHO AV PEAK VELOCITY: 125 CM/S
ECHO AV VTI: 21.6 CM
ECHO LA AREA 4C: 20 CM2
ECHO LA MAJOR AXIS: 5.91 CM
ECHO LA MINOR AXIS: 2.93 CM
ECHO LV E' LATERAL VELOCITY: 10.2 CM/S
ECHO LV E' SEPTAL VELOCITY: 8.03 CM/S
ECHO LV INTERNAL DIMENSION DIASTOLIC: 4.9 CM (ref 4.2–5.9)
ECHO LV INTERNAL DIMENSION SYSTOLIC: 3.1 CM
ECHO LV IVSD: 1.1 CM (ref 0.6–1)
ECHO LV MASS 2D: 188.1 G (ref 88–224)
ECHO LV MASS INDEX 2D: 93.1 G/M2 (ref 49–115)
ECHO LV POSTERIOR WALL DIASTOLIC: 1 CM (ref 0.6–1)
ECHO LVOT DIAM: 2 CM
ECHO LVOT PEAK GRADIENT: 4 MMHG
ECHO LVOT VTI: 24.3 CM
ECHO MV A VELOCITY: 90.7 CM/S
ECHO MV E VELOCITY: 54.8 CM/S
ECHO MV E/A RATIO: 0.6
ECHO MV E/E' LATERAL: 5.37
ECHO MV E/E' RATIO (AVERAGED): 6.1
ECHO MV E/E' SEPTAL: 6.82
ECHO RV INTERNAL DIMENSION: 3.5 CM
ECHO RV TAPSE: 2.39 CM (ref 1.5–2)
GLUCOSE BLD STRIP.AUTO-MCNC: 128 MG/DL (ref 65–100)
GLUCOSE BLD STRIP.AUTO-MCNC: 147 MG/DL (ref 65–100)
SERVICE CMNT-IMP: ABNORMAL
SERVICE CMNT-IMP: ABNORMAL

## 2021-09-03 PROCEDURE — 74011250637 HC RX REV CODE- 250/637: Performed by: INTERNAL MEDICINE

## 2021-09-03 PROCEDURE — 92526 ORAL FUNCTION THERAPY: CPT

## 2021-09-03 PROCEDURE — 97116 GAIT TRAINING THERAPY: CPT

## 2021-09-03 PROCEDURE — 82962 GLUCOSE BLOOD TEST: CPT

## 2021-09-03 PROCEDURE — 74011250636 HC RX REV CODE- 250/636: Performed by: INTERNAL MEDICINE

## 2021-09-03 PROCEDURE — 74011250637 HC RX REV CODE- 250/637: Performed by: NURSE PRACTITIONER

## 2021-09-03 PROCEDURE — 65270000029 HC RM PRIVATE

## 2021-09-03 PROCEDURE — 97161 PT EVAL LOW COMPLEX 20 MIN: CPT

## 2021-09-03 PROCEDURE — 74011000250 HC RX REV CODE- 250: Performed by: INTERNAL MEDICINE

## 2021-09-03 PROCEDURE — 74011250637 HC RX REV CODE- 250/637: Performed by: HOSPITALIST

## 2021-09-03 PROCEDURE — 99218 HC RM OBSERVATION: CPT

## 2021-09-03 PROCEDURE — 96374 THER/PROPH/DIAG INJ IV PUSH: CPT

## 2021-09-03 RX ORDER — INSULIN PUMP SYRINGE, 3 ML
EACH MISCELLANEOUS
Qty: 1 KIT | Refills: 0 | Status: SHIPPED | OUTPATIENT
Start: 2021-09-03

## 2021-09-03 RX ORDER — CLOPIDOGREL BISULFATE 75 MG/1
75 TABLET ORAL DAILY
Qty: 30 TABLET | Refills: 0 | Status: SHIPPED | OUTPATIENT
Start: 2021-09-04 | End: 2021-10-07 | Stop reason: SDUPTHER

## 2021-09-03 RX ORDER — METFORMIN HYDROCHLORIDE 500 MG/1
500 TABLET ORAL
Qty: 30 TABLET | Refills: 0 | Status: SHIPPED | OUTPATIENT
Start: 2021-09-03

## 2021-09-03 RX ORDER — PANTOPRAZOLE SODIUM 40 MG/1
40 TABLET, DELAYED RELEASE ORAL DAILY
Qty: 30 TABLET | Refills: 0 | Status: SHIPPED | OUTPATIENT
Start: 2021-09-03 | End: 2022-01-25

## 2021-09-03 RX ADMIN — CLOPIDOGREL BISULFATE 75 MG: 75 TABLET ORAL at 09:29

## 2021-09-03 RX ADMIN — APIXABAN 5 MG: 5 TABLET, FILM COATED ORAL at 09:30

## 2021-09-03 RX ADMIN — LATANOPROST 1 DROP: 50 SOLUTION OPHTHALMIC at 09:32

## 2021-09-03 RX ADMIN — Medication 10 ML: at 04:50

## 2021-09-03 RX ADMIN — Medication 10 ML: at 14:23

## 2021-09-03 RX ADMIN — METOPROLOL SUCCINATE 25 MG: 25 TABLET, EXTENDED RELEASE ORAL at 09:30

## 2021-09-03 RX ADMIN — PERFLUTREN 1 ML: 6.52 INJECTION, SUSPENSION INTRAVENOUS at 09:40

## 2021-09-03 RX ADMIN — FLECAINIDE ACETATE 100 MG: 100 TABLET ORAL at 09:30

## 2021-09-03 RX ADMIN — LOSARTAN POTASSIUM 50 MG: 50 TABLET, FILM COATED ORAL at 09:29

## 2021-09-03 RX ADMIN — TIMOLOL MALEATE 1 DROP: 2.5 SOLUTION/ DROPS OPHTHALMIC at 09:00

## 2021-09-03 NOTE — PROGRESS NOTES
Problem: Patient Education: Go to Patient Education Activity  Goal: Patient/Family Education  Outcome: Progressing Towards Goal     Problem: TIA/CVA Stroke: 0-24 hours  Goal: Off Pathway (Use only if patient is Off Pathway)  Outcome: Progressing Towards Goal  Goal: Activity/Safety  Outcome: Progressing Towards Goal  Goal: Consults, if ordered  Outcome: Progressing Towards Goal  Goal: Diagnostic Test/Procedures  Outcome: Progressing Towards Goal  Goal: Nutrition/Diet  Outcome: Progressing Towards Goal  Goal: Discharge Planning  Outcome: Progressing Towards Goal  Goal: Medications  Outcome: Progressing Towards Goal  Goal: Respiratory  Outcome: Progressing Towards Goal  Goal: Treatments/Interventions/Procedures  Outcome: Progressing Towards Goal  Goal: Minimize risk of bleeding post-thrombolytic infusion  Outcome: Progressing Towards Goal  Goal: Monitor for complications post-thrombolytic infusion  Outcome: Progressing Towards Goal  Goal: Psychosocial  Outcome: Progressing Towards Goal  Goal: *Hemodynamically stable  Outcome: Progressing Towards Goal  Goal: *Neurologically stable  Description: Absence of additional neurological deficits    Outcome: Progressing Towards Goal  Goal: *Verbalizes anxiety and depression are reduced or absent  Outcome: Progressing Towards Goal  Goal: *Absence of Signs of Aspiration on Current Diet  Outcome: Progressing Towards Goal  Goal: *Absence of deep venous thrombosis signs and symptoms(Stroke Metric)  Outcome: Progressing Towards Goal  Goal: *Ability to perform ADLs and demonstrates progressive mobility and function  Outcome: Progressing Towards Goal  Goal: *Stroke education started(Stroke Metric)  Outcome: Progressing Towards Goal  Goal: *Dysphagia screen performed(Stroke Metric)  Outcome: Progressing Towards Goal  Goal: *Rehab consulted(Stroke Metric)  Outcome: Progressing Towards Goal     Problem: TIA/CVA Stroke: Day 2 Until Discharge  Goal: Off Pathway (Use only if patient is Off Pathway)  Outcome: Progressing Towards Goal  Goal: Activity/Safety  Outcome: Progressing Towards Goal  Goal: Diagnostic Test/Procedures  Outcome: Progressing Towards Goal  Goal: Nutrition/Diet  Outcome: Progressing Towards Goal  Goal: Discharge Planning  Outcome: Progressing Towards Goal  Goal: Medications  Outcome: Progressing Towards Goal  Goal: Respiratory  Outcome: Progressing Towards Goal  Goal: Treatments/Interventions/Procedures  Outcome: Progressing Towards Goal  Goal: Psychosocial  Outcome: Progressing Towards Goal  Goal: *Verbalizes anxiety and depression are reduced or absent  Outcome: Progressing Towards Goal  Goal: *Absence of aspiration  Outcome: Progressing Towards Goal  Goal: *Absence of deep venous thrombosis signs and symptoms(Stroke Metric)  Outcome: Progressing Towards Goal  Goal: *Optimal pain control at patient's stated goal  Outcome: Progressing Towards Goal  Goal: *Tolerating diet  Outcome: Progressing Towards Goal  Goal: *Ability to perform ADLs and demonstrates progressive mobility and function  Outcome: Progressing Towards Goal  Goal: *Stroke education continued(Stroke Metric)  Outcome: Progressing Towards Goal     Problem: Ischemic Stroke: Discharge Outcomes  Goal: *Verbalizes anxiety and depression are reduced or absent  Outcome: Progressing Towards Goal  Goal: *Verbalize understanding of risk factor modification(Stroke Metric)  Outcome: Progressing Towards Goal  Goal: *Hemodynamically stable  Outcome: Progressing Towards Goal  Goal: *Absence of aspiration pneumonia  Outcome: Progressing Towards Goal  Goal: *Aware of needed dietary changes  Outcome: Progressing Towards Goal  Goal: *Verbalize understanding of prescribed medications including anti-coagulants, anti-lipid, and/or anti-platelets(Stroke Metric)  Outcome: Progressing Towards Goal  Goal: *Tolerating diet  Outcome: Progressing Towards Goal  Goal: *Aware of follow-up diagnostics related to anticoagulants  Outcome: Progressing Towards Goal  Goal: *Ability to perform ADLs and demonstrates progressive mobility and function  Outcome: Progressing Towards Goal  Goal: *Absence of DVT(Stroke Metric)  Outcome: Progressing Towards Goal  Goal: *Absence of aspiration  Outcome: Progressing Towards Goal  Goal: *Optimal pain control at patient's stated goal  Outcome: Progressing Towards Goal  Goal: *Home safety concerns addressed  Outcome: Progressing Towards Goal  Goal: *Describes available resources and support systems  Outcome: Progressing Towards Goal  Goal: *Verbalizes understanding of activation of EMS(911) for stroke symptoms(Stroke Metric)  Outcome: Progressing Towards Goal  Goal: *Understands and describes signs and symptoms to report to providers(Stroke Metric)  Outcome: Progressing Towards Goal  Goal: *Neurolgocially stable (absence of additional neurological deficits)  Outcome: Progressing Towards Goal  Goal: *Verbalizes importance of follow-up with primary care physician(Stroke Metric)  Outcome: Progressing Towards Goal  Goal: *Smoking cessation discussed,if applicable(Stroke Metric)  Outcome: Progressing Towards Goal  Goal: *Depression screening completed(Stroke Metric)  Outcome: Progressing Towards Goal     Problem: Falls - Risk of  Goal: *Absence of Falls  Description: Document Darrius Fall Risk and appropriate interventions in the flowsheet.   Outcome: Progressing Towards Goal  Note: Fall Risk Interventions:  Mobility Interventions: Bed/chair exit alarm, Patient to call before getting OOB              Elimination Interventions: Call light in reach, Patient to call for help with toileting needs    History of Falls Interventions: Bed/chair exit alarm         Problem: Patient Education: Go to Patient Education Activity  Goal: Patient/Family Education  Outcome: Progressing Towards Goal     Problem: Diabetes Self-Management  Goal: *Disease process and treatment process  Description: Define diabetes and identify own type of diabetes; list 3 options for treating diabetes. Outcome: Progressing Towards Goal  Goal: *Incorporating nutritional management into lifestyle  Description: Describe effect of type, amount and timing of food on blood glucose; list 3 methods for planning meals. Outcome: Progressing Towards Goal  Goal: *Incorporating physical activity into lifestyle  Description: State effect of exercise on blood glucose levels. Outcome: Progressing Towards Goal  Goal: *Developing strategies to promote health/change behavior  Description: Define the ABC's of diabetes; identify appropriate screenings, schedule and personal plan for screenings. Outcome: Progressing Towards Goal  Goal: *Using medications safely  Description: State effect of diabetes medications on diabetes; name diabetes medication taking, action and side effects. Outcome: Progressing Towards Goal  Goal: *Monitoring blood glucose, interpreting and using results  Description: Identify recommended blood glucose targets  and personal targets. Outcome: Progressing Towards Goal  Goal: *Prevention, detection, treatment of acute complications  Description: List symptoms of hyper- and hypoglycemia; describe how to treat low blood sugar and actions for lowering  high blood glucose level. Outcome: Progressing Towards Goal  Goal: *Prevention, detection and treatment of chronic complications  Description: Define the natural course of diabetes and describe the relationship of blood glucose levels to long term complications of diabetes.   Outcome: Progressing Towards Goal  Goal: *Developing strategies to address psychosocial issues  Description: Describe feelings about living with diabetes; identify support needed and support network  Outcome: Progressing Towards Goal  Goal: *Insulin pump training  Outcome: Progressing Towards Goal  Goal: *Sick day guidelines  Outcome: Progressing Towards Goal  Goal: *Patient Specific Goal (EDIT GOAL, INSERT TEXT)  Outcome: Progressing Towards Goal     Problem: Patient Education: Go to Patient Education Activity  Goal: Patient/Family Education  Outcome: Progressing Towards Goal

## 2021-09-03 NOTE — PROGRESS NOTES
Chart review complete, no change in status, no dc needs noted at this time, CM will remain available to assist as needed.

## 2021-09-03 NOTE — PROGRESS NOTES
09/03/21 1620   NIH Stroke Scale   Interval Other (comment)  (discharge)   Level of Conciousness (1a) 0   LOC Questions (1b) 0   LOC Commands (1c) 0   Best Gaze (2) 0   Visual (3) 0   Facial Palsy (4) 0   Motor Arm, Left (5a) 0   Motor Arm, Right (5b) 0   Motor Leg, Left (6a) 0   Motor Leg, Right (6b) 0   Limb Ataxia (7) 0   Sensory (8) 0   Best Language (9) 0   Dysarthria (10) 0   Extinction and Inattention (11) 0   Total 0

## 2021-09-03 NOTE — PROGRESS NOTES
09/03/21 0715   NIH Stroke Scale   Interval Handoff/Transfer  (Dual [facility] with Minesh Ruiz RN )   Level of Conciousness (1a) 0   LOC Questions (1b) 0   LOC Commands (1c) 0   Best Gaze (2) 0   Visual (3) 0   Facial Palsy (4) 0   Motor Arm, Left (5a) 0   Motor Arm, Right (5b) 0   Motor Leg, Left (6a) 0   Motor Leg, Right (6b) 0   Limb Ataxia (7) 0   Sensory (8) 0   Best Language (9) 0   Dysarthria (10) 0   Extinction and Inattention (11) 0   Total 0

## 2021-09-03 NOTE — DISCHARGE INSTRUCTIONS
Follow up with Dr. Samson Reyes, Vascular Surgery, for your left sided carotid stenosis on 9/14/2021 at 0800   [facility] [address]  [address] [facility], [address] [place] [address] [phone] or go to the emergency room if you develop any red flag signs we discussed today. Please notify your primary care if any test/investigation is not covered by your insurance before getting it done.

## 2021-09-03 NOTE — PROGRESS NOTES
OBSERVATION STATUS  LTG: Patient will tolerate least restrictive diet without overt signs or symptoms of airway compromise. MET 9/3  STG: Patient will tolerate regular diet and thin liquids without overt signs or symptoms of airway compromise. MET 9/3  STG: Patient will participate in modified barium swallow study as clinically indicated. NOT INDICATED 9/3  STG: Patient will participate in cognitive linguistic evaluation     SPEECH LANGUAGE PATHOLOGY: DYSPHAGIA- Daily Note 1    NAME/AGE/GENDER: Guerda Shaffer is a 77 y.o. male  DATE: 9/3/2021  PRIMARY DIAGNOSIS: Slurring of speech [R47.81]      ICD-10: Treatment Diagnosis: R13.12 Dysphagia, Oropharyngeal Phase    RECOMMENDATIONS   DIET:    Regular Consistency   Thin Liquids    MEDICATIONS: With liquid     PRECAUTIONS, MODIFICATIONS, AND STRATEGIES  · Slow rate of intake  · Small bites/sips  · Upright at 90 degrees during meal       RECOMMENDATIONS FOR CONTINUED SPEECH THERAPY:   YES: Anticipate need for ongoing speech therapy during this hospitalization and at next level of care. ASSESSMENT   Patient presents with overall functional oropharyngeal swallow as evidenced by timely prep/transit and no immediate s/sx aspiration. Recommend continue regular diet and thin liquids. Will benefit from a cognitive linguistic evaluation. EDUCATION:  · Recommendations discussed with Patient    REHABILITATION POTENTIAL FOR STATED GOALS: Excellent    PLAN    FREQUENCY/DURATION:   Assessment of cognitive-linguistic abilities to be completed at next session. Frequency and duration to be determined by findings/recommendations. CONTINUATION OF SKILLED SERVICES/MEDICAL NECESSITY:   Patient continues to require skilled intervention due to need for cognitive evaluation. Dysphagia goals met. .     SUBJECTIVE   Upright in chair, alert and pleasant. Reports ready to be discharged. low volume. Able to state reasoning for hospitalization and findings thus far. Oxygen Device: room air  Pain: Pain Scale 1: Numeric (0 - 10)  Pain Intensity 1: 0    History of Present Injury/Illness: Mr. Obed Mitchell  has a past medical history of Acute maxillary sinusitis, Acute pharyngitis, Aftercare for long-term (current) use of antiplatelets/antithrombotics (9/29/2016), Allergic rhinitis, Anemia (9/29/2016), Backache, CAD (coronary artery disease) (9/29/2016), Carotid artery disease ([facility] (9/29/2016), Decreased libido, Diabetes ([facility] (9/29/2016), Dizziness and giddiness, ED (erectile dysfunction) (9/29/2016), Encounter for monitoring testosterone replacement therapy (9/29/2016), Fatigue, Fever blister, GERD (gastroesophageal reflux disease), Glaucoma (9/29/2016), Hearing difficulty of right ear (9/29/2016), High cholesterol, High risk medication use (9/29/2016), Hypertension (9/29/2016), Hyponatremia (9/29/2016), IBS (irritable bowel syndrome) (9/29/2016), Intertrigo (9/29/2016), Irregular heart beat (9/29/2016), Left shoulder pain, Mouth pain, Obstructive sleep apnea of adult (9/29/2016), Osteoporosis (9/29/2016), Palpitations, Plantar wart of left foot (9/29/2016), Rotator cuff tear, Sinus bradycardia (9/29/2016), Sinusitis, Skin infection, Sore throat, Special screening for malignant neoplasm of prostate, Subclinical hyperthyroidism, and Tinea corporis. Dinora Cannon He also  has a past surgical history that includes hx urological. PRECAUTIONS/ALLERGIES: Patient has no known allergies. Problem List:  (Impairments causing functional limitations):  1. CVA workup    Previous Dysphagia: NONE REPORTED  Diet Prior to Evaluation: regular/thin    Orientation:  Person  Place  Time  Situation    OBJECTIVE   Oral Motor:   · Labial: No impairment  · Dentition: Intact  · Oral Hygiene: Adequate  · Lingual: No impairment     Dysphagia evaluation:   Patient presented with thin liquid via cup and straw, mixed, and solid consistencies. Appropriate oral prep with all textures. Impulsive with rate/bite size.  Timely swallow initiation. Adequate oral clearing. Independently used liquid wash after cracker to clear oral cavity. No s/sx of aspiration. Tool Used: Dysphagia Outcome and Severity Scale (ROSEMARY)    Score Comments   Normal Diet  [] 7 With no strategies or extra time needed   Functional Swallow  [] 6 May have mild oral or pharyngeal delay   Mild Dysphagia  [] 5 Which may require one diet consistency restricted    Mild-Moderate Dysphagia  [] 4 With 1-2 diet consistencies restricted   Moderate Dysphagia  [] 3 With 2 or more diet consistencies restricted   Moderate-Severe Dysphagia  [] 2 With partial PO strategies (trials with ST only)   Severe Dysphagia  [] 1 With inability to tolerate any PO safely      Score:  Initial: 6 Most Recent: 7 (Date 09/03/21 )   Interpretation of Tool: The Dysphagia Outcome and Severity Scale (ROSEMARY) is a simple, easy-to-use, 7-point scale developed to systematically rate the functional severity of dysphagia based on objective assessment and make recommendations for diet level, independence level, and type of nutrition.        INTERDISCIPLINARY COLLABORATION: N/A    After treatment position/precautions:  · Upright in chair  · Call light within reach    Total Treatment Duration:   Time In: 1110  Time Out: [address], [facility], [facility], [address] [place] [address]

## 2021-09-03 NOTE — PROGRESS NOTES
Patient discharged home at this time with family. IVs removed. All questions answered. NIH 0 at time of discharge.

## 2021-09-03 NOTE — PROGRESS NOTES
Problem: Patient Education: Go to Patient Education Activity  Goal: Patient/Family Education  Outcome: Progressing Towards Goal     Problem: TIA/CVA Stroke: 0-24 hours  Goal: Activity/Safety  Outcome: Progressing Towards Goal  Goal: Consults, if ordered  Outcome: Progressing Towards Goal  Goal: Diagnostic Test/Procedures  Outcome: Progressing Towards Goal  Goal: Nutrition/Diet  Outcome: Progressing Towards Goal  Goal: Discharge Planning  Outcome: Progressing Towards Goal  Goal: Medications  Outcome: Progressing Towards Goal  Goal: Respiratory  Outcome: Progressing Towards Goal  Goal: Treatments/Interventions/Procedures  Outcome: Progressing Towards Goal  Goal: Minimize risk of bleeding post-thrombolytic infusion  Outcome: Progressing Towards Goal  Goal: Monitor for complications post-thrombolytic infusion  Outcome: Progressing Towards Goal  Goal: Psychosocial  Outcome: Progressing Towards Goal  Goal: *Hemodynamically stable  Outcome: Progressing Towards Goal  Goal: *Neurologically stable  Description: Absence of additional neurological deficits    Outcome: Progressing Towards Goal  Goal: *Verbalizes anxiety and depression are reduced or absent  Outcome: Progressing Towards Goal  Goal: *Absence of Signs of Aspiration on Current Diet  Outcome: Progressing Towards Goal  Goal: *Absence of deep venous thrombosis signs and symptoms(Stroke Metric)  Outcome: Progressing Towards Goal  Goal: *Ability to perform ADLs and demonstrates progressive mobility and function  Outcome: Progressing Towards Goal  Goal: *Stroke education started(Stroke Metric)  Outcome: Progressing Towards Goal  Goal: *Dysphagia screen performed(Stroke Metric)  Outcome: Progressing Towards Goal  Goal: *Rehab consulted(Stroke Metric)  Outcome: Progressing Towards Goal     Problem: Falls - Risk of  Goal: *Absence of Falls  Description: Document Darrius Fall Risk and appropriate interventions in the flowsheet.   Outcome: Progressing Towards Goal  Note: Fall Risk Interventions:  Mobility Interventions: Bed/chair exit alarm, OT consult for ADLs, Patient to call before getting OOB, PT Consult for mobility concerns              Elimination Interventions: Call light in reach, Bed/chair exit alarm, Patient to call for help with toileting needs, Stay With Me (per policy), Urinal in reach    History of Falls Interventions: Bed/chair exit alarm, Door open when patient unattended, Investigate reason for fall         Problem: Patient Education: Go to Patient Education Activity  Goal: Patient/Family Education  Outcome: Progressing Towards Goal     Problem: Diabetes Self-Management  Goal: *Disease process and treatment process  Description: Define diabetes and identify own type of diabetes; list 3 options for treating diabetes. Outcome: Progressing Towards Goal  Goal: *Incorporating nutritional management into lifestyle  Description: Describe effect of type, amount and timing of food on blood glucose; list 3 methods for planning meals. Outcome: Progressing Towards Goal  Goal: *Incorporating physical activity into lifestyle  Description: State effect of exercise on blood glucose levels. Outcome: Progressing Towards Goal  Goal: *Developing strategies to promote health/change behavior  Description: Define the ABC's of diabetes; identify appropriate screenings, schedule and personal plan for screenings. Outcome: Progressing Towards Goal  Goal: *Using medications safely  Description: State effect of diabetes medications on diabetes; name diabetes medication taking, action and side effects. Outcome: Progressing Towards Goal  Goal: *Monitoring blood glucose, interpreting and using results  Description: Identify recommended blood glucose targets  and personal targets.   Outcome: Progressing Towards Goal  Goal: *Prevention, detection, treatment of acute complications  Description: List symptoms of hyper- and hypoglycemia; describe how to treat low blood sugar and actions for lowering high blood glucose level. Outcome: Progressing Towards Goal  Goal: *Prevention, detection and treatment of chronic complications  Description: Define the natural course of diabetes and describe the relationship of blood glucose levels to long term complications of diabetes.   Outcome: Progressing Towards Goal  Goal: *Developing strategies to address psychosocial issues  Description: Describe feelings about living with diabetes; identify support needed and support network  Outcome: Progressing Towards Goal     Problem: Patient Education: Go to Patient Education Activity  Goal: Patient/Family Education  Outcome: Progressing Towards Goal

## 2021-09-03 NOTE — DISCHARGE SUMMARY
Hospitalist Discharge Summary     Admit Date:  2021  7:37 PM   DC note date: 9/3/2021  Name:  Shanika Mckenzie   Age:  77 y.o.  :  1945   MRN:  [id number]   PCP:  Julio Lara MD  Treatment Team: Attending Provider: Chi Gallardo MD; Consulting Provider: Leoncio Leonardo MD; Utilization Review: Cindy Medina RN; Staff Nurse: Nate Raymond RN; Physical Therapist: Nara Bui PT; Care Manager: Betito Maurer RN    Problem List for this Hospitalization:  Hospital Problems as of 9/3/2021 Date Reviewed: 2020        Codes Class Noted - Resolved POA    Left carotid artery stenosis ICD-10-CM: I65.22  ICD-9-CM: 433.10  9/3/2021 - Present Unknown        CVA (cerebral vascular accident) [facility]) ICD-10-CM: I63.9  ICD-9-CM: 434.91  2021 - Present Unknown        PAF (paroxysmal atrial fibrillation) ([email or web address] ICD-10-CM: I48.0  ICD-9-CM: 427.31  2018 - Present Yes        Diabetes mellitus type 2, controlled ([facility] ICD-10-CM: E11.9  ICD-9-CM: 250.00  2016 - Present Unknown        Dyslipidemia ICD-10-CM: E78.5  ICD-9-CM: 272.4  3/10/2016 - Present Yes        * (Principal) RESOLVED: Slurring of speech ICD-10-CM: R47.81  ICD-9-CM: 784.59  2021 - 2021 Unknown              Hospital Course:    Please see HPI and daily progress note for more details. Briefly, 52-year-old male with history of chronic atrial fibrillation on Eliquis, hypertension, diet-controlled prediabetes presented to emergency room with concern of facial droop and slurred speech. He was evaluated by neurology. MRI showed small subacute right putamen stroke. His further imaging showed significant stenosis of left carotid artery. He was evaluated by vascular surgery and they recommended outpatient follow-up. Plavix is added to Eliquis given his recent stroke and carotid artery disease as per neurologist.  Patient has history of GERD so Protonix added. In addition, his HbA1c is now 6.6 so he has diabetes.   I have discussed the diet modification with the patient. Discussed the side effects and benefit of Metformin with patient's family and started patient on low dose Metformin. Patient is evaluated by physical therapist.  His echo showed no acute finding. He has reached optimization of inpatient stay. He has been discharged to home with instruction to follow-up with primary care, neurologist and vascular surgery as recommended. Red flag signs discussed with the patient and his wife on phone. Disposition: Home or Self Care  Activity: Activity as tolerated  Diet: ADULT DIET Regular; 3 carb choices (45 gm/meal)  Code Status: Full Code    Follow Up Orders: Follow-up Appointments   Procedures    FOLLOW UP VISIT Appointment in: Other (Specify) PCP one week. Neurologist in 2 weeks and vascular surgeon as scheduled. PCP one week. Neurologist in 2 weeks and vascular surgeon as scheduled. Standing Status:   Standing     Number of Occurrences:   1     Order Specific Question:   Appointment in     Answer: Other (Specify)       Follow-up Information     Follow up With Specialties Details Why Contact Info    Jennie Chavira MD Family Medicine   [address] [place] [address]  [phone]      Jennie Chavira MD Family Medicine Schedule an appointment as soon as possible for a visit in 1 week  [address]      Ty Ferrlel MD Neurology, Neurology Schedule an appointment as soon as possible for a visit in 2 weeks  [address] [place] [address]      Orlando Kat MD Vascular Surgery  as schuduled on Sep 14. [facility] [address]  [address] [place] [address]  [phone]            Discharge meds at bottom of this note. Plan was discussed with patient and his wife. All questions answered. Patient was stable at time of discharge. Given instructions to call a physician or return if any concerns.   Discharge summary and encounter summary was sent to PCP electronically via \"Comm Mgt\" link in [facility], if possible. Diagnostic Imaging/Tests:   MRI BRAIN WO CONT    Result Date: 9/2/2021  Exam: MRI BRAIN WO CONT on 9/2/2021 1:16 PM Clinical History: The Male patient is 77years old presenting for facial droop and slurring in speech. No hx of surgery or cancer. CT on PACS. Comparison:  Head CT 9/1/2021 Technique:  Axial T2, axial FLAIR, axial diffusion-weighted,  sagittal T1 and coronal gradient-echo scans were performed. Findings: Focal subacute ischemic changes are seen involving the posterior margin of the right putamen. There are otherwise age-related cortical involutional changes with minimal periventricular white matter disease including chronic lacunae throughout the corona radiata and centrum semiovale. There are no abnormal extra-axial fluid collections. No evidence of mass or mass effect is seen. There is no diffusion signal abnormality. Expected flow voids are maintained in the major intracranial vessels. Cerebellar involutional changes. There is no evidence of Chiari malformation. The ventricular system and CSF containing spaces are unremarkable in appearance. Visualized extracranial soft tissues are unremarkable. There is a right frontal scalp soft tissue defect. The paranasal sinuses are well pneumatized and aerated. 1. Subacute ischemic changes involving the right putamen 2. Otherwise chronic white matter changes and cortical involution. SIGNIFICANT RESULT: The significant findings in this report have been referred to the Imaging Navigator in order to communicate to the referring provider or his/her designee as outlined in Section II.C.2.a.ii-iii of the ACR Practice Guideline for Communication of Diagnostic Imaging Findings. CPT code(s) A2902945     CT HEAD WO CONT    Result Date: 9/1/2021  Noncontrast CT of the brain. COMPARISON: none INDICATION: Acute neuro changes. Slurred speech and facial droop.  TECHNIQUE: Contiguous axial images were obtained from the skull base through the vertex without IV contrast. Radiation dose reduction techniques were used for this study:  Our CT scanners use one or all of the following: Automated exposure control, adjustment of the mA and/or kVp according to patient's size, iterative reconstruction. FINDINGS: There is no acute intracranial hemorrhage or evidence for acute territorial infarction. There is no mass effect, midline shift or hydrocephalus. There is no extra-axial fluid collection. The cerebellum and brainstem are grossly unremarkable. Periventricular diffuse hypodensities are nonspecific and likely secondary to chronic small vessel changes. Decreased attenuation in the internal capsules, likely related to small vessel changes. Included globes appear intact. Paranasal sinuses and the mastoid air cells are aerated. There is no skull fracture. 1. No acute intracranial hemorrhage or CT evidence of acute territorial infarction. Note that MRI is more sensitive for detection of acute/subacute infarct and could be considered. 2. Periventricular chronic small vessel changes. CTA HEAD NECK W WO CONT    Result Date: 9/2/2021  History: Slurred speech. Facial droop. Comments: CT ANGIOGRAM OF THE NECK AND CT ANGIOGRAM OF THE [place] OF [place] was obtained following the administration of IV contrast. IV contrast was administered to evaluate the arterial vasculature. Reformatted images in the coronal and sagittal planes as well as 3-D imaging was obtained and reviewed on a dedicated PACS and [address]. Radiation reduction dose techniques were used for the study. Our CT scanner use one or all of the following- Automated exposure control, adjustment of the mA and/or KV according the patient size, iterative reconstruction. All measurements are based upon NASCET criteria if appropriate.  Findings: CT ANGIOGRAM OF THE NECK: The arch and proximal great vessels are patent with mild atherosclerotic changes. The right carotid bulb demonstrates concentric calcified plaque disease however degree of stenosis is less than 50%. The left carotid bulb demonstrates eccentric calcified plaque disease. Degree of stenosis is less than 50% however the proximal left internal carotid artery demonstrates near occlusive short segment stenosis. The vessel reconstitutes shortly thereafter. The internal carotid arteries are tortuous. Atherosclerotic changes of the vertebral arteries are present with probable significant narrowing at the origin on the left. The right is dominant. The lung apices are clear. Thyroid gland is unremarkable. CT ANGIOGRAM OF [place] OF [place]: The petrous, cavernous, and supraclinoid internal carotid arteries are patent with atherosclerotic changes in the supraclinoid portions. The A1 segment of the anterior circulation on the left is diminutive in size. The middle cerebral arteries are patent. The distal vertebral arteries demonstrate atherosclerotic changes at the V4 segment on the right. The left is diminutive. The basilar artery and posterior cerebral arteries are patent. The dural venous sinuses are patent. 1. Short segment near occlusive stenosis of the proximal left internal carotid artery. The vessel reconstitutes flow shortly thereafter. This finding was relayed to the patient's nurse upon interpretation     ECHO ADULT COMPLETE    Result Date: 9/3/2021  · LV: Estimated LVEF is 55 - 60%. Normal cavity size, wall thickness, systolic function (ejection fraction normal) and diastolic function. Wall motion: normal. · AV: Mild aortic valve regurgitation is present. · Saline contrast was given to evaluate for intracardiac shunt. There was no evidence of intracardiac shunting · Echo study was technically difficulty. · Contrast used: DEFINITY. Echocardiogram results:  No results found for this visit on 09/01/21.     Procedures done this admission:  * No surgery found *    All Micro Results Procedure Component Value Units Date/Time    SARS-COV-2, PCR [[id number]] Collected: 09/01/21 2259    Order Status: Completed Specimen: Nasopharyngeal Updated: 09/02/21 0834     Specimen source Nasopharyngeal        SARS-CoV-2 Not detected        Comment:      The specimen is NEGATIVE for SARS-CoV-2, the novel coronavirus associated with COVID-19. This test has been authorized by the FDA under an Emergency Use Authorization (EUA) for use by authorized laboratories. Fact sheet for Healthcare Providers: Reading Room       Fact sheet for Patients: Reading Room       Methodology: RT-PCR         COVID-19 RAPID TEST [[id number]] Collected: 09/01/21 2259    Order Status: Completed Specimen: Nasopharyngeal Updated: 09/01/21 2333     Specimen source NASAL        COVID-19 rapid test Not detected        Comment:      The specimen is NEGATIVE for SARS-CoV-2, the novel coronavirus associated with COVID-19. A negative result does not rule out COVID-19. This test has been authorized by the FDA under an Emergency Use Authorization (EUA) for use by authorized laboratories.         Fact sheet for Healthcare Providers: [place]  Fact sheet for Patients: Reading Room       Methodology: Isothermal Nucleic Acid Amplification               SARS-CoV-2 Lab Results  \"Novel Coronavirus\" Test: No results found for: COV2NT   \"Emergent Disease\" Test: No results found for: EDPR  \"SARS-COV-2\" Test: No results found for: XGCOVT  \"Precision Labs\" Test: No results found for: RSLT  Rapid Test:   Lab Results   Component Value Date/Time    COVR Not detected 09/01/2021 10:59 PM            Labs: Results:       BMP, Mg, Phos Recent Labs     09/02/21  0837 09/01/21  1914   * 131*   K 4.3 4.5    99   CO2 25 27   AGAP 6* 5*   BUN 14 18   CREA 0.81 0.99   CA 9.3 9.8   * 141*      CBC Recent Labs 09/01/21  1914   WBC 6.6   RBC 4.23   HGB 13.8   HCT 40.0*      GRANS 64   LYMPH 21   EOS 2   MONOS 12   BASOS 1   IG 0   ANEU 4.3   ABL 1.4   JEREMY 0.1   ABM 0.8   ABB 0.0   AIG 0.0      LFT Recent Labs     09/02/21  0837   ALT 22   AP 91   TP 6.7   ALB 3.7   GLOB 3.0   AGRAT 1.2      Cardiac Testing No results found for: BNPP, BNP, CPK, RCK1, RCK2, RCK3, RCK4, CKMB, CKNDX, CKND1, TROPT, TROIQ   Coagulation Tests Lab Results   Component Value Date/Time    Prothrombin time 13.9 09/01/2021 07:14 PM    INR 1.0 09/01/2021 07:14 PM      A1c Lab Results   Component Value Date/Time    Hemoglobin A1c 6.6 (H) 09/02/2021 04:19 AM    Hemoglobin A1c 6.2 (H) 10/29/2019 08:45 AM    Hemoglobin A1c, External 6.2 09/06/2016 12:00 AM      Lipid Panel Lab Results   Component Value Date/Time    Cholesterol, total 126 09/02/2021 04:19 AM    HDL Cholesterol 53 09/02/2021 04:19 AM    LDL, calculated 62.2 09/02/2021 04:19 AM    VLDL, calculated 10.8 09/02/2021 04:19 AM    Triglyceride 54 09/02/2021 04:19 AM    CHOL/HDL Ratio 2.4 09/02/2021 04:19 AM      Thyroid Panel Lab Results   Component Value Date/Time    TSH 0.686 09/02/2021 08:37 AM    TSH 0.671 11/12/2018 09:14 AM    T4, Free 1.2 09/02/2021 08:37 AM        Most Recent UA No results found for: COLOR, APPRN, REFSG, LUCY, PROTU, GLUCU, KETU, BILU, BLDU, UROU, FADIA, LEUKU, WBCU, RBCU, UEPI, BACTU, CASTS, UCRY, MUCUS, UCOM     No Known Allergies  Immunization History   Administered Date(s) Administered    COVID-19, PFIZER, MRNA, LNP-S, PF, 30MCG/0.3ML DOSE 02/13/2021    Influenza High Dose Vaccine PF 09/25/2017    Influenza Vaccine 09/25/2015    Pneumococcal Conjugate (PCV-13) 03/05/2015    Pneumococcal Polysaccharide (PPSV-23) 11/29/2006, 09/25/2017    Tdap 11/04/2008       All Labs from Last 24 Hrs:  Recent Results (from the past 24 hour(s))   GLUCOSE, POC    Collection Time: 09/02/21  4:32 PM   Result Value Ref Range    Glucose (POC) 114 (H) 65 - 100 mg/dL    Performed by Rogelio    GLUCOSE, POC    Collection Time: 09/02/21  9:20 PM   Result Value Ref Range    Glucose (POC) 127 (H) 65 - 100 mg/dL    Performed by Nikhil    GLUCOSE, POC    Collection Time: 09/03/21  6:27 AM   Result Value Ref Range    Glucose (POC) 128 (H) 65 - 100 mg/dL    Performed by Juancarlos    ECHO ADULT COMPLETE    Collection Time: 09/03/21  8:45 AM   Result Value Ref Range    Left Atrium Major Axis 5.91 cm    LA Area 4C 20.00 cm2    Aortic Valve Systolic Mean Gradient 3.07 mmHg    AoV VTI 21.60 cm    Aortic Valve Systolic Peak Velocity 637.93 cm/s    AoV PG 6.00 mmHg    Aortic Valve Area by Continuity of VTI 3.53 cm2    Aortic Valve Area by Continuity of Peak Velocity 2.51 cm2    Ao Root D 3.50 cm    AO ASC D 3.40 cm    IVSd 1.10 (A) 0.60 - 1.00 cm    LVIDd 4.90 4.20 - 5.90 cm    LVIDs 3.10 cm    LVOT d 2.00 cm    LVOT VTI 24.30 cm    LVOT Peak Gradient 4.00 mmHg    LVPWd 1.00 0.60 - 1.00 cm    LV E' Septal Velocity 8.03 cm/s    LV E' Lateral Velocity 10.20 cm/s    MV A [place] 90.70 cm/s    MV E [place] 54.80 cm/s    RVIDd 3.50 cm    Tapse 2.39 (A) 1.50 - 2.00 cm    MV E/A 0.60     LV Mass .1 88.0 - 224.0 g    LV Mass AL Index 93.1 49.0 - 115.0 g/m2    E/E' lateral 5.37     E/E' septal 6.82     E/E' ratio (averaged) 6.10     Left Atrium Minor Axis 2.93 cm    KEZIA/BSA Pk [place] 1.2 cm2/m2    KEZIA/BSA VTI 1.7 cm2/m2   GLUCOSE, POC    Collection Time: 09/03/21 11:04 AM   Result Value Ref Range    Glucose (POC) 147 (H) 65 - 100 mg/dL    Performed by [facility]        Discharge Exam:  Patient Vitals for the past 24 hrs:   Temp Pulse Resp BP SpO2   09/03/21 1200 97.4 °F (36.3 °C) 61 20 130/81 97 %   09/03/21 0939    (!) 153/89    09/03/21 0800 97.9 °F (36.6 °C) (!) 56 20 (!) 148/82 95 %   09/03/21 0400 97.4 °F (36.3 °C) (!) 59 16 (!) 158/79 98 %   09/03/21 0000 98.3 °F (36.8 °C) 61 16 (!) 145/95 98 %   09/02/21 2000 98.4 °F (36.9 °C) 63 18 (!) 148/83 95 %   09/02/21 1600 98.1 °F (36.7 °C) 79 18 111/85 94 %     Oxygen Therapy  O2 Sat (%): 97 % (09/03/21 1200)  Pulse via Oximetry: 67 beats per minute (09/01/21 2300)  O2 Device: None (Room air) (09/01/21 1904)    Estimated body mass index is 29.65 kg/m² as calculated from the following:    Height as of this encounter: 5' 8\" (1.727 m). Weight as of this encounter: 88.5 kg (195 lb). No intake or output data in the 24 hours ending 09/03/21 1246    *Note that automatically entered I/Os may not be accurate; dependent on patient compliance with collection and accurate  by assistants. General:          Well nourished. No overt distress  Head:               Normocephalic, atraumatic  Eyes:               Sclerae appear normal.  Pupils equally round. Neck:                Supple. No JVD. CV:                  RRR. No m/r/g. No JVD  Lungs:             CTAB. No wheezing, rhonchi, or rales. Appears even, unlabored  Abdomen: Bowel sounds present. Soft, nontender, nondistended. Extremities:     Warm and dry. No cyanosis or clubbing. No edema. Skin:                No rashes. Normal turgor. Normal coloration  Neuro:              AOX3, moving all 4 extremities with motor strength 5 out of 5, no facial droop. Speech normal.  Psych:             Normal mood and affect.   Alert and oriented x3  Current Med List in Hospital:   Current Facility-Administered Medications   Medication Dose Route Frequency    clopidogreL (PLAVIX) tablet 75 mg  75 mg Oral DAILY    labetaloL (NORMODYNE;TRANDATE) injection 10 mg  10 mg IntraVENous Q4H PRN    losartan (COZAAR) tablet 50 mg  50 mg Oral DAILY    timolol (TIMOPTIC) 0.25% ophthalmic solution  1 Drop Both Eyes DAILY    latanoprost (XALATAN) 0.005 % ophthalmic solution 1 Drop  1 Drop Both Eyes QPM    flecainide (TAMBOCOR) tablet 100 mg  100 mg Oral BID    acetaminophen (TYLENOL) tablet 650 mg  650 mg Oral Q6H PRN    sodium chloride (NS) flush 5-40 mL  5-40 mL IntraVENous Q8H    sodium chloride (NS) flush 5-40 mL  5-40 mL IntraVENous PRN    insulin lispro (HUMALOG) injection   SubCUTAneous AC&HS    rosuvastatin (CRESTOR) tablet 40 mg  40 mg Oral QHS    metoprolol succinate (TOPROL-XL) XL tablet 25 mg  25 mg Oral DAILY    apixaban (ELIQUIS) tablet 5 mg  5 mg Oral BID       Discharge Info:   Current Discharge Medication List      START taking these medications    Details   clopidogreL (PLAVIX) 75 mg tab Take 1 Tablet by mouth daily. Qty: 30 Tablet, Refills: 0  Start date: 9/4/2021      pantoprazole (PROTONIX) 40 mg tablet Take 1 Tablet by mouth daily. Qty: 30 Tablet, Refills: 0  Start date: 9/3/2021      Blood-Glucose Meter monitoring kit Use as instructed. Qty: 1 Kit, Refills: 0  Start date: 9/3/2021      metFORMIN (GLUCOPHAGE) 500 mg tablet Take 1 Tablet by mouth daily (with breakfast). Qty: 30 Tablet, Refills: 0  Start date: 9/3/2021      glucose blood VI test strips (ASCENSIA AUTODISC VI, ONE TOUCH ULTRA TEST VI) strip Use once a day as instructed. Qty: 100 Strip, Refills: 0  Start date: 9/3/2021         CONTINUE these medications which have NOT CHANGED    Details   metoprolol succinate (TOPROL-XL) 25 mg XL tablet Take 1 Tablet by mouth daily. Qty: 30 Tablet, Refills: 5      flecainide (TAMBOCOR) 100 mg tablet Take 1 Tablet by mouth two (2) times a day. Qty: 180 Tablet, Refills: 3      losartan (COZAAR) 50 mg tablet Take 1 Tab by mouth daily. Qty: 90 Tab, Refills: 3    Associated Diagnoses: Essential hypertension      apixaban (ELIQUIS) 5 mg tablet Take 1 Tab by mouth two (2) times a day. Qty: 180 Tab, Refills: 3      magnesium oxide (MAG-OX) 400 mg tablet Take 1 Tab by mouth two (2) times a day. Qty: 180 Tab, Refills: 3      rosuvastatin (CRESTOR) 40 mg tablet Take 1 Tab by mouth nightly.   Qty: 90 Tab, Refills: 1      latanoprost (XALATAN) 0.005 % ophthalmic solution USE 1 DROP IN BOTH EYES EVERY EVENING  Refills: 4      omega 3-dha-epa-fish oil (FISH OIL) 900-1,400 mg cpDR Take 2 Caps by mouth daily for 90 days. Last dose 01/14/10   Indications: ARTERIOSCLEROTIC VASCULAR DISEASE PREVENTION  Qty: 180 Cap, Refills: 3      timolol (TIMOPTIC) 0.25 % ophthalmic solution Administer 1 Drop to both eyes daily. multivitamin (ONE A DAY) tablet Take 1 Tab by mouth daily. Centrum silver last dose 01/14/10       Blood Pressure Monitor (BLOOD PRESSURE KIT) kit Check BP daily. Qty: 1 Kit, Refills: 0    Associated Diagnoses: Essential hypertension      calcium citrate-vitamin D3 (CITRACAL + D) tablet One am(with centrum) and two pm   Indications: OSTEOPOROSIS  Qty: 90 Tab, Refills: 3           Location changes discussed with patient's family. Requested medication sent to pharmacy of choice    Time spent in patient discharge planning and coordination 35 minutes.     Signed:  Emil Vega MD

## 2021-09-03 NOTE — PROGRESS NOTES
PHYSICAL THERAPY ASSESSMENT: Initial Assessment, Daily Note and AM PT Treatment Day # 1      Silverio Ruiz is a 77 y.o. male   PRIMARY DIAGNOSIS: Slurring of speech  Slurring of speech [R47.81]       Reason for Referral:    ICD-10: Treatment Diagnosis: Generalized Muscle Weakness (M62.81)  OBSERVATION: Payor: [employer]ICARE / Plan: [address] [place] [address] HMO / Product Type: Managed Care Medicare /     ASSESSMENT:     REHAB RECOMMENDATIONS:   Recommendation to date pending progress:  Setting:   No further skilled therapy   Equipment:    None     PRIOR LEVEL OF FUNCTION:  (Prior to Hospitalization) INITIAL/CURRENT LEVEL OF FUNCTION:  (Most Recently Demonstrated)   Bed Mobility:   Independent  Sit to Stand:   Independent  Transfers:   Independent  Gait/Mobility:   Independent Bed Mobility:   Independent  Sit to Stand:   Independent  Transfers:   Independent  Gait/Mobility:   Independent     ASSESSMENT:  Mr. Oziel Santizo is a 77year old WM with an admitting diagnosis of slurred speech. His PMH includes A-fib, HTN and DM. He presents today sitting up in the bedside chair agreeable to have therapy. He states he is feeling fine and hopes to go home. He reports he has been up ambulating on his own in the room. He moves himself well and stood to demonstrate his mobility without assistance. He managed forward/backward and turning steps with no loss of balance. He ambulated 200' on the unit without an assistive device with supervision. His gait was slow, steady with a normal gait pattern. His BLEs tend to stay externally rotated during standing and gait and he has a slightly forward bent standing posture. He returned to the room and returned to sit in the recliner. He was very kind and cooperative and appears to be functioning at his baseline level with no skilled need for PT at this time.       SUBJECTIVE:   Mr. Oziel Santizo states, \"I feel like myself and now it seems I need to get my carotid arteries cleaned out\"    SOCIAL HISTORY/LIVING ENVIRONMENT: Lives with his wife in a 1 level home with 5 steps to enter. Independent with mobility prior to this admisison.     OBJECTIVE:     PAIN: VITAL SIGNS: LINES/DRAINS:   Pre Treatment: Pain Screen  Pain Scale 1: Numeric (0 - 10)  Pain Intensity 1: 0  Post Treatment: 0/10     Visit Vitals  BP (!) 153/89   Pulse (!) 56   Temp 97.9 °F (36.6 °C)   Resp 20   Ht 5' 8\" (1.727 m)   Wt 88.5 kg (195 lb)   SpO2 95%   BMI 29.65 kg/m²      O2 Device: None (Room air)     GROSS EVALUATION:   Within Functional Limits Abnormal/ Functional Abnormal/ Non-Functional (see comments) Not Tested Comments:   AROM [x] [] [] []    PROM [x] [] [] []    Strength [x] [] [] []    Balance [x] [] [] []    Posture [] [x] [] [] slightly stooped forward   Sensation [x] [] [] []    Coordination [x] [] [] []    Tone [x] [] [] []    Edema [x] [] [] []    Activity Tolerance [x] [] [] []     [] [] [] []      COGNITION/  PERCEPTION: Intact Impaired   (see comments) Comments:   Orientation [x] []    Vision [x] [] Has glasses   Hearing [] [x] Wears hearing aids   Command Following [x] []    Safety Awareness [x] []     [] []      MOBILITY: I Mod I S SBA CGA Min Mod Max Total  NT x2 Comments:   Bed Mobility    Rolling [x] [] [] [] [] [] [] [] [] [] []    Supine to Sit [x] [] [] [] [] [] [] [] [] [] []    Scooting [x] [] [] [] [] [] [] [] [] [] []    Sit to Supine [x] [] [] [] [] [] [] [] [] [] []    Transfers    Sit to Stand [x] [] [] [] [] [] [] [] [] [] []    Bed to Chair [x] [] [] [] [] [] [] [] [] [] []    Stand to Sit [x] [] [] [] [] [] [] [] [] [] []    I=Independent, Mod I=Modified Independent, S=Supervision, SBA=Standby Assistance, CGA=Contact Guard Assistance,   Min=Minimal Assistance, Mod=Moderate Assistance, Max=Maximal Assistance, Total=Total Assistance, NT=Not Tested  GAIT: I Mod I S SBA CGA Min Mod Max Total  NT x2 Comments:   Level of Assistance [] [] [x] [] [] [] [] [] [] [] []    Distance 200'    DME None    Gait Quality Normal gait pattern, slightly stooped standing posture, BLE externally rotated out during standing and gait. Weightbearing Status N/A     I=Independent, Mod I=Modified Independent, S=Supervision, SBA=Standby Assistance, CGA=Contact Guard Assistance,   Min=Minimal Assistance, Mod=Moderate Assistance, Max=Maximal Assistance, Total=Total Assistance, NT=Not [facility]       How much difficulty does the patient currently have. .. Unable A Lot A Little None   1. Turning over in bed (including adjusting bedclothes, sheets and blankets)? [] 1   [] 2   [] 3   [x] 4   2. Sitting down on and standing up from a chair with arms ( e.g., wheelchair, bedside commode, etc.)   [] 1   [] 2   [] 3   [x] 4   3. Moving from lying on back to sitting on the side of the bed? [] 1   [] 2   [] 3   [x] 4   How much help from another person does the patient currently need. .. Total A Lot A Little None   4. Moving to and from a bed to a chair (including a wheelchair)? [] 1   [] 2   [] 3   [x] 4   5. Need to walk in hospital room? [] 1   [] 2   [] 3   [x] 4   6. Climbing 3-5 steps with a railing? [] 1   [] 2   [] 3   [x] 4   © 2007, Trustees of [address] [place] [address], under license to [employer]. All rights reserved     Score:  Initial: 24 Most Recent: X (Date: -- )    Interpretation of Tool:  Represents activities that are increasingly more difficult (i.e. Bed mobility, Transfers, Gait). PLAN:   FREQUENCY/DURATION:   for duration of hospital stay or until stated goals are met, whichever comes first.    PROBLEM LIST:   (Skilled intervention is medically necessary to address:)  1. No skilled PT need for funcitonal mobility   INTERVENTIONS PLANNED:   (Benefits and precautions of physical therapy have been discussed with the patient.)  1.  No skilled PT need for functional mobility     TREATMENT:     EVALUATION: Low Complexity : (Untimed Charge)    TREATMENT:   (     )  Gait Training (9 Minutes): Gait training for 200 feet utilizing None. Patient required Verbal cueing to improve Activity Pacing.      AFTER TREATMENT POSITION/PRECAUTIONS:  Chair, Needs within reach and RN notified    INTERDISCIPLINARY COLLABORATION:  RN/PCT    TOTAL TREATMENT DURATION:  PT Patient Time In/Time Out  Time In: 1044  Time Out: 735 [facility]

## 2021-09-03 NOTE — PROGRESS NOTES
Pt medically ready for dc home today, CM met with pt and he is agreeable to outpt therapy at [facility] clinic, orders faxed for ST central scheduling office. Care Management Interventions  PCP Verified by CM:  Yes  Mode of Transport at Discharge: [address] (CM Consult):  (out pt ST at [facility] clinic)  Discharge Durable Medical Equipment: No  Physical Therapy Consult: Yes  Occupational Therapy Consult: Yes  Speech Therapy Consult: Yes  Current Support Network: Own Home, Lives with Spouse  Confirm Follow Up Transport: Family  Discharge Location  Discharge Placement: Home

## 2021-09-15 ENCOUNTER — HOSPITAL ENCOUNTER (OUTPATIENT)
Dept: PHYSICAL THERAPY | Age: 76
Discharge: HOME | End: 2021-09-15
Payer: MEDICARE

## 2021-09-15 PROCEDURE — 92522 EVALUATE SPEECH PRODUCTION: CPT

## 2021-09-15 NOTE — THERAPY EVALUATION
Luz De Santiago : 1945 [facility] [employer] Hmo Secondary:  [facility], [facility], [address] [phone]   [phone] OUTPATIENT SPEECH LANGUAGE PATHOLOGY: Initial Assessment ICD-10: Treatment Diagnosis: dysarthria and anarthria R 47.1 REFERRING PHYSICIAN: Angela Siu MD  
PCP: Hoda Butler MD MD Orders: speech evaluate and treat Return Physician Appointment:  
PAST MEDICAL HISTORY:  
Mr. Mervat Valero is a 77 y.o. male who  has a past medical history of Acute maxillary sinusitis, Acute pharyngitis, Aftercare for long-term (current) use of antiplatelets/antithrombotics (2016), Allergic rhinitis, Anemia (2016), Backache, CAD (coronary artery disease) (2016), Carotid artery disease ([address] (2016), Decreased libido, Diabetes ([address] (2016), Dizziness and giddiness, ED (erectile dysfunction) (2016), Encounter for monitoring testosterone replacement therapy (2016), Fatigue, Fever blister, GERD (gastroesophageal reflux disease), Glaucoma (2016), Hearing difficulty of right ear (2016), High cholesterol, High risk medication use (2016), Hypertension (2016), Hyponatremia (2016), IBS (irritable bowel syndrome) (2016), Intertrigo (2016), Irregular heart beat (2016), Left shoulder pain, Mouth pain, Obstructive sleep apnea of adult (2016), Osteoporosis (2016), Palpitations, Plantar wart of left foot (2016), Rotator cuff tear, Sinus bradycardia (2016), Sinusitis, Skin infection, Sore throat, Special screening for malignant neoplasm of prostate, Subclinical hyperthyroidism, and Tinea corporis. He also  has a past surgical history that includes hx urological. 
MEDICAL/REFERRING DIAGNOSIS: Slurred speech [R47.81] DATE OF ONSET: 21 PRIOR LEVEL OF FUNCTION: with spouse PRECAUTIONS/ALLERGIES: NKDA ASSESSMENT:Mr. Mervat Valero is a 78 y/o male referred to ST due to dysarthria. He reported having a \" little stroke\". Noted he was hospitalized from 9/1-9/3 as a result. He had a bedside swallowing evaluation at that time with recommendations for a regular diet. He reported no signs/sx aspiration with po though he does clear his throat all of the time. He reported this occurs with and without po. He stated he thinks his speech is fine now as long as he swallows enough. However, as he's talking to someone he can fill the saliva gathering. He reported that began after his CVA. Based on the objective data described below, the patient presents with min dysarthria characterized by min decreased precision and blended word boundaries. An oral motor evaluation revealed adequate facial symmetry and adequate rate and ROM with alternating motion rate tasks. Mildly decreased rate observed with alternating labial protrusion/retraction. Min decreased precision noted with the oral reading of \"The [place] Passage\". Pt's speech is 100% intelligible in known and unknown contexts. He reported no cognitive deficits. His cognition was screened using the SLUMS. He achieved an overall score of 22/30 with a norm score of 27 or greater. Deficits were observed with restating 5 words immediately and with delays and daily math problems. However, he reported he feels his performance is baseline. Therefore, recommend ST once weekly to address dysarthria. Patient will benefit from skilled intervention to address the above impairments. ?????? ? ? This section established at most recent assessment?????????? 
PROBLEM LIST (Impairments causing functional limitations): 1. Dysarthria GOALS: (Goals have been discussed and agreed upon with patient.) SHORT-TERM FUNCTIONAL GOALS: Time Frame: 2 months 1. Pt will over articulate in words/sentences with 80% accuracy. 2. Pt will use a slow rate when reading words/sentences with 80% accuracy.  
3. Pt will over articulate when reading alliterative sentences with 80% accuracy. 4. Pt will use compensatory strategies for speech (over articulation, decreased rate) with only mild cues needed. 5. Pt will complete a home exercise program a min of 5 days weekly. DISCHARGE GOALS: Time Frame: 3-4 months 1. Increased motor speech skills evidenced by adequate speech precision 95% of the time. REHABILITATION POTENTIAL FOR STATED GOALS: Good PLAN OF CARE:INTERVENTIONS PLANNED: (Benefits and precautions of therapy have been discussed with the patient.) 1. Motor speech tasks TREATMENT PLAN EFFECTIVE DATES: 9/15/2021 TO 11/15/2021 (60 days). FREQUENCY/DURATION: Continue to follow patient 1 time a week for 60 days to address above goals. Regarding Heidi Chaidez Gerda's therapy, I certify that the treatment plan above will be carried out by a therapist or under their direction. Thank you for this referral, 
Sharmin Dee, [facility], [facility], [address] [place] [address] Referring Physician Signature: Damian Lester MD     Date SUBJECTIVE:Pt cooperative. Present Symptoms: dysarthria Current Medications: see chart Date Last Reviewed: 9/15/21 Social History/Home Situation: with spouse Work/Activity History: retired OBJECTIVE:Objective Measure: Tool Used: National Outcomes Measurement System: Functional Communication Measures: MOTOR SPEECH Score:  Initial: 5 Most Recent: X (Date: -- ) Interpretation of Tool: This measure describes the change in functional communication status subsequent to speech-language pathology treatment of patients who have motor speech deficits. o Level 1:  The individual attempts to speak, but speech cannot be understood by familiar or unfamiliar listeners at any time. o Level 2:  The individual attempts to speak.  The communication partner must assume responsibility for interpreting the message, and with consistent and maximal cues, the patient can produce short consonant-vowel combinations or automatic words that are rarely intelligible in context. 
o Level 3:  The communication partner must assume primary responsibility for interpreting the communication exchange; however, the individual is able to produce short consonant-vowel combinations or automatic words intelligibly. With consistent and moderate cueing, the individual can produce simple words and phrases intelligibly, although accuracy may vary. o Level 4: In simple structured conversation with familiar communication partners, the individual can produce simple words and phrases intelligibly. The individual usually requires moderate cueing in order to produce simple sentences intelligibly, although accuracy may vary. o Level 5:  The individual is able to speak intelligibly using simple sentences in daily routine activities with both familiar and unfamiliar communication partners. The individual occasionally requires minimal cueing to produce more complex sentences/messages in routine activities, although accuracy may vary and the individual may occasionally use compensatory strategies. o Level 6:  The individual is successfully able to communicate intelligibly in most activities, but some limitations in intelligibility are still apparent in vocational, avocational, and social activities. The individual rarely requires minimal cueing to produce complex sentences/messages intelligibly. The individual usually uses compensatory strategies when encountering difficulty. o Level 7: The individuals ability to successfully and independently participate in vocational, avocational, or social activities is not limited by speech production. Independent functioning may occasionally include the use of compensatory techniques. Score Level 7 Level 6 Level 5 Level 4 Level 3 Level 2 Level 1 Modifier CH CI CJ CK CL CM CN  
 
 
SPEECH-LANGUAGE COGNITIVE EVALUATION Tests Given: portions of the Dysarthria Examination Battery, cognition screened using the SLUMS Mental Status: 
Neurologic State: Alert Motor Speech: 
Oral Motor Structure/Speech:  Oral-Motor Structure/Motor Speech Face: No impairment Labial: No impairment Dentition: Intact, Natural 
Oral Hygiene: adequate Dysarthric Characteristics: Blended word boundaries, Imprecise Intelligibility: No impairment Intonation: [facility] Rate: [facility] Prosody: [facility] Overall Impairment Severity: Minimal 
Oral reading of \"The [place]father Passage\": min decreased precision S/z ratio: 7/7 Sustained \"ah\": 7 seconds Alternating lingual tasks: [facility] Alternating labial tasks: mildly decreased rate Puhpuhpuh: [facility] HaleyuhtuhRhmike Bolds Ifeoma KaurnaniRhmike Bolds [place]: x 11 in 5 seconds Buttercup: x10 in 5 seconds Caterpillar: x14 in 10 seconds Auditory Comprehension: Auditory Comprehension Auditory Impairment: No  
 
Verbal Expression: 
 Verbal Expression Primary Mode of Expression: Verbal 
Initiation: No impairment Neuro-Linguistics: SLUMS: achieved a score of 22/30 with a score of 27 or greater considered norm. Pragmatics: WFL Assessment only; No treatment(s) provided today 
__________________________________________________________________________________________________ History of Present Injury/Illness (Reason for Referral): Treatment Assessment:   . Progression/Medical Necessity:  
· Skilled intervention continues to be required due to decreased communication with family/caregivers. Compliance with Program/Exercises: Will assess as treatment progresses}. Reason for Continuation of Services/Other Comments: 
· Patient continues to require skilled intervention due to dysarthria. Recommendations/Intent for next treatment session: \"Treatment next visit will focus on motor speech tasks\". Total Treatment Duration: 
Time In: 6953 Time Out: 1115 [address], [facility] SHERRIE PAPPAS, [facility] Future Appointments Date Time Provider Sarah Avila 9/22/2021 11:15 AM Abraham Villavicencio, SLP [facility] 9/30/2021 10:30 AM Marcus Villavicencio, SLP [facility]  
10/6/2021  8:00 AM Marcus Trammell SLP [facility]  
1/25/2022  8:00 AM Alireza Magallon MD S[facility]CD[facility] UCD Visit Approval Visit # Therapist initials Date A NS / Cx < 24 hr >24 hr Cx Comments 1 RL 9/15 [x]  [] [] Initial evaluation  
    [] [] []   

## 2021-09-22 ENCOUNTER — HOSPITAL ENCOUNTER (OUTPATIENT)
Dept: PHYSICAL THERAPY | Age: 76
Discharge: HOME | End: 2021-09-22
Payer: MEDICARE

## 2021-09-22 PROCEDURE — 92507 TX SP LANG VOICE COMM INDIV: CPT

## 2021-09-22 NOTE — PROGRESS NOTES
Tania Malhotra  : 1945  Primary: Dejuan Cheema Medicare Hmo  Secondary:  [address]  [facility], [address]  [phone]   [phone]        OUTPATIENT SPEECH LANGUAGE PATHOLOGY: Daily Note: 1    ICD-10: Treatment Diagnosis: dysarthria and anarthria R 47.1  REFERRING PHYSICIAN: Jamar Pham MD   PCP: Wolfgang Schafer MD MD Orders: speech evaluate and treat  Return Physician Appointment:   PAST MEDICAL HISTORY:   Mr. Edilma Tobias is a 77 y.o. male who  has a past medical history of Acute maxillary sinusitis, Acute pharyngitis, Aftercare for long-term (current) use of antiplatelets/antithrombotics (2016), Allergic rhinitis, Anemia (2016), Backache, CAD (coronary artery disease) (2016), Carotid artery disease ([facility] [address] (2016), Decreased libido, Diabetes ([facility] [address] (2016), Dizziness and giddiness, ED (erectile dysfunction) (2016), Encounter for monitoring testosterone replacement therapy (2016), Fatigue, Fever blister, GERD (gastroesophageal reflux disease), Glaucoma (2016), Hearing difficulty of right ear (2016), High cholesterol, High risk medication use (2016), Hypertension (2016), Hyponatremia (2016), IBS (irritable bowel syndrome) (2016), Intertrigo (2016), Irregular heart beat (2016), Left shoulder pain, Mouth pain, Obstructive sleep apnea of adult (2016), Osteoporosis (2016), Palpitations, Plantar wart of left foot (2016), Rotator cuff tear, Sinus bradycardia (2016), Sinusitis, Skin infection, Sore throat, Special screening for malignant neoplasm of prostate, Subclinical hyperthyroidism, and Tinea corporis.   He also  has a past surgical history that includes hx urological.  MEDICAL/REFERRING DIAGNOSIS: Slurred speech [R47.81]  DATE OF ONSET: 21  PRIOR LEVEL OF FUNCTION: with spouse  PRECAUTIONS/ALLERGIES: NKDA  ASSESSMENT:Pt requires mod cues to use a slower rate and over articulate in structured and non structured tasks. Patient will benefit from skilled intervention to address the above impairments. ?????? ? ? This section established at most recent assessment??????????  PROBLEM LIST (Impairments causing functional limitations):  1. Dysarthria   GOALS: (Goals have been discussed and agreed upon with patient.)  SHORT-TERM FUNCTIONAL GOALS: Time Frame: 2 months  1. Pt will over articulate in words/sentences with 80% accuracy. 2. Pt will use a slow rate when reading words/sentences with 80% accuracy. 3. Pt will over articulate when reading alliterative sentences with 80% accuracy. 4. Pt will use compensatory strategies for speech (over articulation, decreased rate) with only mild cues needed. 5. Pt will complete a home exercise program a min of 5 days weekly. DISCHARGE GOALS: Time Frame: 3-4 months   1. Increased motor speech skills evidenced by adequate speech precision 95% of the time. REHABILITATION POTENTIAL FOR STATED GOALS: Good  PLAN OF CARE:INTERVENTIONS PLANNED: (Benefits and precautions of therapy have been discussed with the patient.)  1. Motor speech tasks  TREATMENT PLAN EFFECTIVE DATES: 9/15/2021 TO 11/15/2021 (60 days). FREQUENCY/DURATION: Continue to follow patient 1 time a week for 60 days to address above goals. Regarding Roma Lorenz's therapy, I certify that the treatment plan above will be carried out by a therapist or under their direction. Thank you for this referral,  Benedicto Wolf, [facility], [facility], [address] [facility]                    Referring Physician Signature: Pop Mercer MD     Date      SUBJECTIVE:Pt cooperative. Present Symptoms: dysarthria       Current Medications: see chart   Date Last Reviewed: 9/22/21  Social History/Home Situation: with spouse      Work/Activity History: retired    OBJECTIVE:Objective Measure:   Tool Used: National Outcomes Measurement System: Functional Communication Measures: MOTOR SPEECH  Score: Initial: 5 Most Recent: X (Date: -- )   Interpretation of Tool: This measure describes the change in functional communication status subsequent to speech-language pathology treatment of patients who have motor speech deficits. o Level 1:  The individual attempts to speak, but speech cannot be understood by familiar or unfamiliar listeners at any time. o Level 2:  The individual attempts to speak. The communication partner must assume responsibility for interpreting the message, and with consistent and maximal cues, the patient can produce short consonant-vowel combinations or automatic words that are rarely intelligible in context.  o Level 3:  The communication partner must assume primary responsibility for interpreting the communication exchange; however, the individual is able to produce short consonant-vowel combinations or automatic words intelligibly. With consistent and moderate cueing, the individual can produce simple words and phrases intelligibly, although accuracy may vary. o Level 4: In simple structured conversation with familiar communication partners, the individual can produce simple words and phrases intelligibly. The individual usually requires moderate cueing in order to produce simple sentences intelligibly, although accuracy may vary. o Level 5:  The individual is able to speak intelligibly using simple sentences in daily routine activities with both familiar and unfamiliar communication partners. The individual occasionally requires minimal cueing to produce more complex sentences/messages in routine activities, although accuracy may vary and the individual may occasionally use compensatory strategies. o Level 6:  The individual is successfully able to communicate intelligibly in most activities, but some limitations in intelligibility are still apparent in vocational, avocational, and social activities.  The individual rarely requires minimal cueing to produce complex sentences/messages intelligibly. The individual usually uses compensatory strategies when encountering difficulty. o Level 7: The individuals ability to successfully and independently participate in vocational, avocational, or social activities is not limited by speech production. Independent functioning may occasionally include the use of compensatory techniques. Score Level 7 Level 6 Level 5 Level 4 Level 3 Level 2 Level 1   Modifier CH CI CJ CK CL CM CN       Mental Status:  Alert       Motor Speech:  Over articulating in bisyllabic words: 80%  With multiyllabic words: 80%  Slow rate with reading multsyllabic words: 80%  Slow rate with reading phrases: 70%; 70%. Over articulating with reading phrases: 70%; 70%. Slow rate with reading sentences: 70%; 70%; Over articulating with reading sentences: 60%; 60%. Over articulating with minimal pairs: 60%; 60%. Slow rate with minimal pairs: 70%; 70%. Over articulating with the velar sentences: 70%; 70%. Slow rate with velar sentences: 80%; 70%. Speech/Language Activities: Activities/Procedures listed utilized to improve expressive communication. Required moderate cueing to increase communication with family/caregivers. __________________________________________________________________________________________________  History of Present Injury/Illness (Reason for Referral):    Treatment Assessment:   . Progression/Medical Necessity:   · Skilled intervention continues to be required due to decreased communication with family/caregivers. Compliance with Program/Exercises: Will assess as treatment progresses}. Reason for Continuation of Services/Other Comments:  · Patient continues to require skilled intervention due to dysarthria. Recommendations/Intent for next treatment session: \"Treatment next visit will focus on motor speech tasks\".      Total Treatment Duration:  Time In: 1120  Time Out: [address], [facility], [facility] SHERRIE PAPPAS, [facility]-SLP      Future Appointments   Date Time Provider Sarah Avila   9/30/2021 10:30 AM Luly Villavicencio, SLP [facility] SFD   10/6/2021  8:00 AM Luly Villavicencio SLP [facility]   1/25/2022  8:00 AM Chirag Bliss MD SSA UCDG UCD       Visit Approval Visit # Therapist initials Date A NS / Cx < 24 hr >24 hr Cx Comments    1 RL 9/15 [x]  [] [] Initial evaluation    2 RL 9/22 [x] [] [] Tx       [] [] []        [] [] []        [] [] []        [] [] []        [] [] []        [] [] []        [] [] []        [] [] []        [] [] []        [] [] []        [] [] []        [] [] []        [] [] []        [] [] []        [] [] []        [] [] []

## 2021-09-30 ENCOUNTER — HOSPITAL ENCOUNTER (OUTPATIENT)
Dept: PHYSICAL THERAPY | Age: 76
Discharge: HOME | End: 2021-09-30
Payer: MEDICARE

## 2021-09-30 PROCEDURE — 92507 TX SP LANG VOICE COMM INDIV: CPT

## 2021-09-30 NOTE — PROGRESS NOTES
Payton Pinedo  : 1945  Primary: Tamera Cheema Medicare Hmo  Secondary:  [address]  at [facility]  [address], [address] [facility], [address]  [phone]   [phone]        OUTPATIENT SPEECH LANGUAGE PATHOLOGY: Daily Note: 2    ICD-10: Treatment Diagnosis: dysarthria and anarthria R 47.1  REFERRING PHYSICIAN: Kamron Osborn MD   PCP: Brady Alonso MD MD Orders: speech evaluate and treat  Return Physician Appointment:   PAST MEDICAL HISTORY:   Mr. Leanne Patino is a 77 y.o. male who  has a past medical history of Acute maxillary sinusitis, Acute pharyngitis, Aftercare for long-term (current) use of antiplatelets/antithrombotics (2016), Allergic rhinitis, Anemia (2016), Backache, CAD (coronary artery disease) (2016), Carotid artery disease ([address] (2016), Decreased libido, Diabetes ([facility] [address] (2016), Dizziness and giddiness, ED (erectile dysfunction) (2016), Encounter for monitoring testosterone replacement therapy (2016), Fatigue, Fever blister, GERD (gastroesophageal reflux disease), Glaucoma (2016), Hearing difficulty of right ear (2016), High cholesterol, High risk medication use (2016), Hypertension (2016), Hyponatremia (2016), IBS (irritable bowel syndrome) (2016), Intertrigo (2016), Irregular heart beat (2016), Left shoulder pain, Mouth pain, Obstructive sleep apnea of adult (2016), Osteoporosis (2016), Palpitations, Plantar wart of left foot (2016), Rotator cuff tear, Sinus bradycardia (2016), Sinusitis, Skin infection, Sore throat, Special screening for malignant neoplasm of prostate, Subclinical hyperthyroidism, and Tinea corporis.   He also  has a past surgical history that includes hx urological.  MEDICAL/REFERRING DIAGNOSIS: Slurred speech [R47.81]  DATE OF ONSET: 21  PRIOR LEVEL OF FUNCTION: with spouse  PRECAUTIONS/ALLERGIES: NKDA  ASSESSMENT:Pt reported he got his carotid artery surgery scheduled for 10/11. His pre-assessment is Thursday the 7th. He feels the root cause of his speech deficits is excessive saliva which has been ongoing for months. He doesn't feel his CVA affected his speech. However, his spouse is \"suspicious\" of the CVA. He independently stated the compensatory strategy for using a slow rate. He didn't recall over articulation. Pf frequently uses a slow rate and over articulates at the beginning of a page. However the rate increases as he progresses resulting in decreased precision. Patient will benefit from skilled intervention to address the above impairments. ?????? ? ? This section established at most recent assessment??????????  PROBLEM LIST (Impairments causing functional limitations):  1. Dysarthria   GOALS: (Goals have been discussed and agreed upon with patient.)  SHORT-TERM FUNCTIONAL GOALS: Time Frame: 2 months  1. Pt will over articulate in words/sentences with 80% accuracy. 2. Pt will use a slow rate when reading words/sentences with 80% accuracy. 3. Pt will over articulate when reading alliterative sentences with 80% accuracy. 4. Pt will use compensatory strategies for speech (over articulation, decreased rate) with only mild cues needed. 5. Pt will complete a home exercise program a min of 5 days weekly. DISCHARGE GOALS: Time Frame: 3-4 months   1. Increased motor speech skills evidenced by adequate speech precision 95% of the time. REHABILITATION POTENTIAL FOR STATED GOALS: Good  PLAN OF CARE:INTERVENTIONS PLANNED: (Benefits and precautions of therapy have been discussed with the patient.)  1. Motor speech tasks  TREATMENT PLAN EFFECTIVE DATES: 9/15/2021 TO 11/15/2021 (60 days). FREQUENCY/DURATION: Continue to follow patient 1 time a week for 60 days to address above goals. Regarding Ra Lorenz's therapy, I certify that the treatment plan above will be carried out by a therapist or under their direction.   Thank you for this referral,  NINO Henderson, CCC-SLP      SUBJECTIVE:Pt cooperative. Present Symptoms: dysarthria       Current Medications: see chart   Date Last Reviewed: 9/30/21  Social History/Home Situation: with spouse      Work/Activity History: retired    OBJECTIVE:Objective Measure: Tool Used: National Outcomes Measurement System: Functional Communication Measures: MOTOR SPEECH  Score:  Initial: 5 Most Recent: X (Date: -- )   Interpretation of Tool: This measure describes the change in functional communication status subsequent to speech-language pathology treatment of patients who have motor speech deficits. o Level 1:  The individual attempts to speak, but speech cannot be understood by familiar or unfamiliar listeners at any time. o Level 2:  The individual attempts to speak. The communication partner must assume responsibility for interpreting the message, and with consistent and maximal cues, the patient can produce short consonant-vowel combinations or automatic words that are rarely intelligible in context.  o Level 3:  The communication partner must assume primary responsibility for interpreting the communication exchange; however, the individual is able to produce short consonant-vowel combinations or automatic words intelligibly. With consistent and moderate cueing, the individual can produce simple words and phrases intelligibly, although accuracy may vary. o Level 4: In simple structured conversation with familiar communication partners, the individual can produce simple words and phrases intelligibly. The individual usually requires moderate cueing in order to produce simple sentences intelligibly, although accuracy may vary. o Level 5:  The individual is able to speak intelligibly using simple sentences in daily routine activities with both familiar and unfamiliar communication partners.  The individual occasionally requires minimal cueing to produce more complex sentences/messages in routine activities, although accuracy may vary and the individual may occasionally use compensatory strategies. o Level 6:  The individual is successfully able to communicate intelligibly in most activities, but some limitations in intelligibility are still apparent in vocational, avocational, and social activities. The individual rarely requires minimal cueing to produce complex sentences/messages intelligibly. The individual usually uses compensatory strategies when encountering difficulty. o Level 7: The individuals ability to successfully and independently participate in vocational, avocational, or social activities is not limited by speech production. Independent functioning may occasionally include the use of compensatory techniques. Score Level 7 Level 6 Level 5 Level 4 Level 3 Level 2 Level 1   Modifier CH CI CJ CK CL CM CN       Mental Status:  Alert       Motor Speech:  Over articulating with palatal and alveolar minimal pairs: 60%; 60%; 70%; 70%. Slow rate with palatal minimal pairs: 70%; 70; 80%; 80%. Slow rate with reading palatal and alveolar phrases/sentences: 75%; 70%; 75%; 75%. Over articulating with reading phrases/sentences: 70%; 70%; 70%; 70%. Slow rate with reading sentences: 70%; 70%; 70%; 75%. Over articulating with reading sentences: 60%; 60%; 70%; 70%. Over articulating with reading 4-5 word sentences: 75%; 70%; Slow rate with 4-5 word sentences: 75%; 70%. Speech/Language Activities: Activities/Procedures listed utilized to improve expressive communication. Required moderate cueing to increase communication with family/caregivers. __________________________________________________________________________________________________  History of Present Injury/Illness (Reason for Referral):    Treatment Assessment:   . Progression/Medical Necessity:   · Skilled intervention continues to be required due to decreased communication with family/caregivers.   Compliance with Program/Exercises: Will assess as treatment progresses}. Reason for Continuation of Services/Other Comments:  · Patient continues to require skilled intervention due to dysarthria. Recommendations/Intent for next treatment session: \"Treatment next visit will focus on motor speech tasks\".      Total Treatment Duration:  Time In: 1030  Time Out: [facility], [facility] SHERRIE PAPPAS, CCC-SLP      Future Appointments   Date Time Provider Sarah Avila   10/6/2021  8:00 AM Lavonne Villavicencio, SLP [facility]   10/7/2021  8:55 AM [address]IVE THRU SSA IMD IMD   10/7/2021 10:00 AM SFD ASSESSMENT RM 01 SFDORPA SFD OR PRE A   10/26/2021 10:45 AM Maximino Ga MD SSA BSVS VSA   1/25/2022  8:00 AM Peterson Valle MD SSA UCDG UCD       Visit Approval Visit # Therapist initials Date A NS / Cx < 24 hr >24 hr Cx Comments    1 RL 9/15 [x]  [] [] Initial evaluation    2 RL 9/22 [x] [] [] Tx    3 RL 9/30 [x] [] [] Tx       [] [] []        [] [] []        [] [] []        [] [] []        [] [] []        [] [] []        [] [] []        [] [] []        [] [] []        [] [] []        [] [] []        [] [] []        [] [] []        [] [] []        [] [] []

## 2021-10-01 NOTE — PROGRESS NOTES
Physician Progress Note      Jeancarlos Aly  CSN #:                  [id number]  :                       1945  ADMIT DATE:       2021 7:37 PM  [address] DATE:        9/3/2021 4:25 PM  RESPONDING  PROVIDER #:        Peter Millan MD          QUERY TEXT:    Patient admitted with CVA, noted to have chronic atrial fibrillation and is maintained on Eliquis . If possible, please document in progress notes and?discharge?summary if you are evaluating and/or treating any of the following: The medical record reflects the following:?  ? Risk Factors: 77 YOM, HTN, CAD, DM2, Chronic AF  ? Clinical Indicators: Chronic AF on anticoagulant, presented with stroke like symptoms: slurred speech and facial droop  : MRI Head 1. Subacute ischemic changes involving the right putamen2. Otherwise chronic white matter changes and cortical involution  9/3 DC Summary: Plavix is added to Eliquis given his recent stroke and carotid artery disease as per neurologist  ? Treatment: Monitoring, Tele, Neurology consult, Vascular consult, Eliquis, Toprol, Tambacor, ASA, added Plavix    Thank you,  Wilman Grider RN,C BSN  Clinical   [email or web address]  Options provided:  -- Secondary hypercoagulable state in a patient with atrial fibrillation  -- Other - I will add my own diagnosis  -- Disagree - Not applicable / Not valid  -- Disagree - Clinically unable to determine / Unknown  -- Refer to Clinical Documentation Reviewer    PROVIDER RESPONSE TEXT:    Provider is clinically unable to determine a response to this query. Query created by:  Екатерина Villagomez on 10/1/2021 1:04 PM      Electronically signed by:  Peter Millan MD 10/1/2021 4:40 PM

## 2021-10-06 ENCOUNTER — HOSPITAL ENCOUNTER (OUTPATIENT)
Dept: PHYSICAL THERAPY | Age: 76
Discharge: HOME | End: 2021-10-06
Payer: MEDICARE

## 2021-10-06 PROCEDURE — 92507 TX SP LANG VOICE COMM INDIV: CPT

## 2021-10-06 NOTE — THERAPY DISCHARGE
Payton Pinedo  : 1945  Primary: Tamera Cheema Medicare Hmo  Secondary:  [address]  [facility], [facility], [address]  [phone]   [phone]        OUTPATIENT SPEECH LANGUAGE PATHOLOGY: Daily Note and Discharge Summary    ICD-10: Treatment Diagnosis: dysarthria and anarthria R 47.1  REFERRING PHYSICIAN: Kamron Osborn MD   PCP: Brady Alonso MD MD Orders: speech evaluate and treat  Return Physician Appointment:   PAST MEDICAL HISTORY:   Mr. Leanne Patino is a 77 y.o. male who  has a past medical history of Acute maxillary sinusitis, Acute pharyngitis, Aftercare for long-term (current) use of antiplatelets/antithrombotics (2016), Allergic rhinitis, Anemia (2016), Backache, CAD (coronary artery disease) (2016), Carotid artery disease ([facility] (2016), Decreased libido, Diabetes ([facility] (2016), Dizziness and giddiness, ED (erectile dysfunction) (2016), Encounter for monitoring testosterone replacement therapy (2016), Fatigue, Fever blister, GERD (gastroesophageal reflux disease), Glaucoma (2016), Hearing difficulty of right ear (2016), High cholesterol, High risk medication use (2016), Hypertension (2016), Hyponatremia (2016), IBS (irritable bowel syndrome) (2016), Intertrigo (2016), Irregular heart beat (2016), Left shoulder pain, Mouth pain, Obstructive sleep apnea of adult (2016), Osteoporosis (2016), Palpitations, Plantar wart of left foot (2016), Rotator cuff tear, Sinus bradycardia (2016), Sinusitis, Skin infection, Sore throat, Special screening for malignant neoplasm of prostate, Subclinical hyperthyroidism, and Tinea corporis.   He also  has a past surgical history that includes hx urological.  MEDICAL/REFERRING DIAGNOSIS: Slurred speech [R47.81]  DATE OF ONSET: 21  PRIOR LEVEL OF FUNCTION: with spouse  PRECAUTIONS/ALLERGIES: NKDA  ASSESSMENT:Pt has attended 5 sessions from 9/15-10/6 due to dysarthria. Pt continues to exhibit min dysarthria characterized by min decreased precision and blended word boundaries. He reports his speech isn't slurred but feels he has excessive saliva affecting his precision. Pt requires mild-mod cues to use compensatory strategies. However, his speech intelligibility is 100%. He is compliant with his home exercise program.  It is felt he has met rehab potential at this time and is ready for discharge. Pt in agreement. ?????? ? ? This section established at most recent assessment??????????  PROBLEM LIST (Impairments causing functional limitations):  1. Dysarthria   GOALS: (Goals have been discussed and agreed upon with patient.)  SHORT-TERM FUNCTIONAL GOALS:  1. Pt will over articulate in words/sentences with 80% accuracy. Goal not met. 2. Pt will use a slow rate when reading words/sentences with 80% accuracy. Goal not met. 3. Pt will over articulate when reading alliterative sentences with 80% accuracy. Goal not met. 4. Pt will use compensatory strategies for speech (over articulation, decreased rate) with only mild cues needed. Goal not met. 5. Pt will complete a home exercise program a min of 5 days weekly. Goal met. DISCHARGE GOALS:   1. Increased motor speech skills evidenced by adequate speech precision 95% of the time. Goal not met. PLAN OF CARE:Discharge from [address] [place], [place] [address]Gema Thank you for this referral,  NINO Holbrook, CCC-SLP      SUBJECTIVE:Pt cooperative. Present Symptoms: dysarthria       Current Medications: see chart   Date Last Reviewed: 10/6/21  Social History/Home Situation: with spouse      Work/Activity History: retired    OBJECTIVE:Objective Measure:   Tool Used: National Outcomes Measurement System: Functional Communication Measures: MOTOR SPEECH  Score:  Initial: 5 Most Recent: 5 (Date: 10/6/21 )   Interpretation of Tool: This measure describes the change in functional communication status subsequent to speech-language pathology treatment of patients who have motor speech deficits. o Level 1:  The individual attempts to speak, but speech cannot be understood by familiar or unfamiliar listeners at any time. o Level 2:  The individual attempts to speak. The communication partner must assume responsibility for interpreting the message, and with consistent and maximal cues, the patient can produce short consonant-vowel combinations or automatic words that are rarely intelligible in context.  o Level 3:  The communication partner must assume primary responsibility for interpreting the communication exchange; however, the individual is able to produce short consonant-vowel combinations or automatic words intelligibly. With consistent and moderate cueing, the individual can produce simple words and phrases intelligibly, although accuracy may vary. o Level 4: In simple structured conversation with familiar communication partners, the individual can produce simple words and phrases intelligibly. The individual usually requires moderate cueing in order to produce simple sentences intelligibly, although accuracy may vary. o Level 5:  The individual is able to speak intelligibly using simple sentences in daily routine activities with both familiar and unfamiliar communication partners. The individual occasionally requires minimal cueing to produce more complex sentences/messages in routine activities, although accuracy may vary and the individual may occasionally use compensatory strategies. o Level 6:  The individual is successfully able to communicate intelligibly in most activities, but some limitations in intelligibility are still apparent in vocational, avocational, and social activities. The individual rarely requires minimal cueing to produce complex sentences/messages intelligibly. The individual usually uses compensatory strategies when encountering difficulty. o Level 7:   The individuals ability to successfully and independently participate in vocational, avocational, or social activities is not limited by speech production. Independent functioning may occasionally include the use of compensatory techniques. Score Level 7 Level 6 Level 5 Level 4 Level 3 Level 2 Level 1   Modifier CH CI CJ CK CL CM CN       Mental Status:  Alert       Motor Speech:  Over articulating with \"s\" phrases: 70%; 75%; 70%; 75%. With sentences: 70%; 70%; 70%; 70%. With using a slow rate: 70%; 80%; 80%; 80%; 70%; 70%; 80%; 80%. Speech/Language Activities: Activities/Procedures listed utilized to improve expressive communication. Required mild cueing to increase communication with family/caregivers.   __________________________________________________________________________________________________      Total Treatment Duration:  Time In: 0805  Time Out: [address], [address] CCC-SLP      Future Appointments   Date Time Provider Sarah Margarita   10/7/2021  8:55 AM [address]IVE THRU [facility] IMD IMD   10/7/2021 10:00 AM SFD ASSESSMENT RM 01 SFDORPA SFD OR PRE A   10/26/2021 10:45 AM Monse Coburn MD [facility] BSVS VSA   1/25/2022  8:00 AM Ashok Fermin MD [facility] UC UCD       Visit Approval Visit # Therapist initials Date A NS / Cx < 24 hr >24 hr Cx Comments    1 RL 9/15 [x]  [] [] Initial evaluation    2 RL 9/22 [x] [] [] Tx    3 RL 9/30 [x] [] [] Tx    4 RL 10/6 [x] [] [] Tx       [] [] []        [] [] []        [] [] []        [] [] []        [] [] []        [] [] []        [] [] []        [] [] []        [] [] []        [] [] []        [] [] []        [] [] []        [] [] []        [] [] []

## 2021-10-07 ENCOUNTER — HOSPITAL ENCOUNTER (OUTPATIENT)
Dept: SURGERY | Age: 76
Discharge: HOME | End: 2021-10-07
Attending: SURGERY
Payer: MEDICARE

## 2021-10-07 ENCOUNTER — ANESTHESIA EVENT (OUTPATIENT)
Dept: SURGERY | Age: 76
DRG: 039 | End: 2021-10-07
Payer: MEDICARE

## 2021-10-07 VITALS
HEART RATE: 53 BPM | OXYGEN SATURATION: 96 % | SYSTOLIC BLOOD PRESSURE: 155 MMHG | DIASTOLIC BLOOD PRESSURE: 76 MMHG | HEIGHT: 68 IN | RESPIRATION RATE: 17 BRPM | WEIGHT: 194.3 LBS | TEMPERATURE: 97.8 F | BODY MASS INDEX: 29.45 KG/M2

## 2021-10-07 LAB
ANION GAP SERPL CALC-SCNC: 7 MMOL/L (ref 7–16)
BUN SERPL-MCNC: 14 MG/DL (ref 8–23)
CALCIUM SERPL-MCNC: 9.3 MG/DL (ref 8.3–10.4)
CHLORIDE SERPL-SCNC: 98 MMOL/L (ref 98–107)
CO2 SERPL-SCNC: 24 MMOL/L (ref 21–32)
CREAT SERPL-MCNC: 0.74 MG/DL (ref 0.8–1.5)
ERYTHROCYTE [DISTWIDTH] IN BLOOD BY AUTOMATED COUNT: 12.1 % (ref 11.9–14.6)
GLUCOSE BLD STRIP.AUTO-MCNC: 106 MG/DL (ref 65–100)
GLUCOSE SERPL-MCNC: 108 MG/DL (ref 65–100)
HCT VFR BLD AUTO: 38.2 % (ref 41.1–50.3)
HGB BLD-MCNC: 13.5 G/DL (ref 13.6–17.2)
MCH RBC QN AUTO: 32.6 PG (ref 26.1–32.9)
MCHC RBC AUTO-ENTMCNC: 35.3 G/DL (ref 31.4–35)
MCV RBC AUTO: 92.3 FL (ref 79.6–97.8)
NRBC # BLD: 0 K/UL (ref 0–0.2)
PLATELET # BLD AUTO: 199 K/UL (ref 150–450)
PMV BLD AUTO: 9.2 FL (ref 9.4–12.3)
POTASSIUM SERPL-SCNC: 4.2 MMOL/L (ref 3.5–5.1)
RBC # BLD AUTO: 4.14 M/UL (ref 4.23–5.6)
SARS-COV-2, COV2: NORMAL
SARS-COV-2, COV2: NOT DETECTED
SERVICE CMNT-IMP: ABNORMAL
SODIUM SERPL-SCNC: 129 MMOL/L (ref 136–145)
SPECIMEN SOURCE, FCOV2M: NORMAL
WBC # BLD AUTO: 5.1 K/UL (ref 4.3–11.1)

## 2021-10-07 PROCEDURE — 82962 GLUCOSE BLOOD TEST: CPT

## 2021-10-07 PROCEDURE — 85027 COMPLETE CBC AUTOMATED: CPT

## 2021-10-07 PROCEDURE — 80048 BASIC METABOLIC PNL TOTAL CA: CPT

## 2021-10-07 PROCEDURE — U0005 INFEC AGEN DETEC AMPLI PROBE: HCPCS

## 2021-10-07 RX ORDER — FLUTICASONE PROPIONATE 50 MCG
1 SPRAY, SUSPENSION (ML) NASAL DAILY
COMMUNITY
Start: 2021-04-05

## 2021-10-07 NOTE — PERIOP NOTES
PLEASE CONTINUE TAKING ALL PRESCRIPTION MEDICATIONS UP TO THE DAY OF SURGERY UNLESS OTHERWISE DIRECTED BELOW. DISCONTINUE all vitamins and supplements 7 days prior to surgery. DISCONTINUE Non-Steriodal Anti-Inflammatory (NSAIDS) such as Advil and Aleve 5 days prior to surgery. Home Medications to take  the day of surgery   Flecainide  Flonase Nasal Spray   Metoprolol (Toprol)  Pantoprazole (Protonix)        Home Medications   to Hold   Losartan (Cozaar)  Metformin (Glucophage)   Magnesium Oxide     Comments   Refer to Dr. Socorro Mendiola office for instructions on Eliquis and Plavix   On the day before surgery please take Acetaminophen 1000mg in the morning and then again before bed. You may substitute for Tylenol 650 mg. Please do not bring home medications with you on the day of surgery unless otherwise directed by your nurse. If you are instructed to bring home medications, please give them to your nurse as they will be administered by the nursing staff. If you have any questions, please call [facility] [phone] or [address] [place] [address] [phone]. A copy of this note was provided to the patient for reference.

## 2021-10-07 NOTE — PERIOP NOTES
Patient verified name and     Order for consent was not found in EHR and matches case posting; patient verified. Type III surgery, walk in assessment complete. Labs per surgeon: none received. Labs per anesthesia protocol: CBC, BMP, POC Glucose today and DOS, Type and Screen DOS; results FSBS 106 today. [id number], acceptable per protocol. Dr. Jarad Villarreal reviewed chart, asked if patient wanted walk in assessment with anesthesia and patient stated he was \"comfortable\" not having a walk in assessment. Patient COVID test date 10/7/2021. Covid testing site closed this am due to bad weather. PCR covid test during PAT visit. Hospital approved surgical skin cleanser and instructions given per hospital policy. Patient provided with and instructed on educational handouts including Guide to Surgery, Pain Management, Hand Hygiene, Blood Transfusion Education, and [facility] Brochure. Patient answered medical/surgical history questions at their best of ability. All prior to admission medications documented in [facility]. Original medication prescription bottle were visualized during patient appointment. Patient instructed to hold all vitamins 7 days prior to surgery and NSAIDS 5 days prior to surgery, patient verbalized understanding. Patient teach back successful and patient demonstrates knowledge of instructions. Dr. Karina Swenson office called twice to clarify instructions for Eliquis and Plavix. Spoke with Mane Tate both times and she is aware how patient is presently taking these medicaitons and will inform Dr. Jarek Zuniga. She related she will talk to patient and wife when she receives instructions.

## 2021-10-11 ENCOUNTER — APPOINTMENT (OUTPATIENT)
Dept: ULTRASOUND IMAGING | Age: 76
DRG: 039 | End: 2021-10-11
Attending: SURGERY
Payer: MEDICARE

## 2021-10-11 ENCOUNTER — HOSPITAL ENCOUNTER (INPATIENT)
Age: 76
LOS: 1 days | Discharge: HOME | DRG: 039 | End: 2021-10-12
Attending: SURGERY | Admitting: SURGERY
Payer: MEDICARE

## 2021-10-11 ENCOUNTER — ANESTHESIA (OUTPATIENT)
Dept: SURGERY | Age: 76
DRG: 039 | End: 2021-10-11
Payer: MEDICARE

## 2021-10-11 DIAGNOSIS — I65.22 LEFT CAROTID ARTERY STENOSIS: Primary | ICD-10-CM

## 2021-10-11 DIAGNOSIS — I65.22 STENOSIS OF LEFT CAROTID ARTERY: ICD-10-CM

## 2021-10-11 DIAGNOSIS — I65.22 CAROTID STENOSIS, LEFT: ICD-10-CM

## 2021-10-11 LAB
ABO + RH BLD: NORMAL
ACT BLD: 125 SECS (ref 70–128)
ACT BLD: 356 SECS (ref 70–128)
BLOOD GROUP ANTIBODIES SERPL: NORMAL
GLUCOSE BLD STRIP.AUTO-MCNC: 140 MG/DL (ref 65–100)
GLUCOSE BLD STRIP.AUTO-MCNC: 231 MG/DL (ref 65–100)
GLUCOSE BLD STRIP.AUTO-MCNC: 232 MG/DL (ref 65–100)
SERVICE CMNT-IMP: ABNORMAL
SPECIMEN EXP DATE BLD: NORMAL

## 2021-10-11 PROCEDURE — 77030002933 HC SUT MCRYL J&J -A: Performed by: SURGERY

## 2021-10-11 PROCEDURE — 77030031139 HC SUT VCRL2 J&J -A: Performed by: SURGERY

## 2021-10-11 PROCEDURE — 77030013292 HC BOWL MX PRSM J&J -A: Performed by: NURSE ANESTHETIST, CERTIFIED REGISTERED

## 2021-10-11 PROCEDURE — 77030020407 HC IV BLD WRMR ST 3M -A: Performed by: NURSE ANESTHETIST, CERTIFIED REGISTERED

## 2021-10-11 PROCEDURE — 74011636637 HC RX REV CODE- 636/637: Performed by: NURSE PRACTITIONER

## 2021-10-11 PROCEDURE — 74011250636 HC RX REV CODE- 250/636: Performed by: SURGERY

## 2021-10-11 PROCEDURE — 86901 BLOOD TYPING SEROLOGIC RH(D): CPT

## 2021-10-11 PROCEDURE — C1768 GRAFT, VASCULAR: HCPCS | Performed by: SURGERY

## 2021-10-11 PROCEDURE — 74011250637 HC RX REV CODE- 250/637: Performed by: ANESTHESIOLOGY

## 2021-10-11 PROCEDURE — 35201 REPAIR BLOOD VESSEL DIR NECK: CPT | Performed by: SURGERY

## 2021-10-11 PROCEDURE — 03UL0JZ SUPPLEMENT LEFT INTERNAL CAROTID ARTERY WITH SYNTHETIC SUBSTITUTE, OPEN APPROACH: ICD-10-PCS | Performed by: SURGERY

## 2021-10-11 PROCEDURE — 77030037400 HC ADH TISS HI VISC EXOFIN CHMP -B: Performed by: SURGERY

## 2021-10-11 PROCEDURE — 65610000006 HC RM INTENSIVE CARE

## 2021-10-11 PROCEDURE — 77030002996 HC SUT SLK J&J -A: Performed by: SURGERY

## 2021-10-11 PROCEDURE — 77030018673: Performed by: SURGERY

## 2021-10-11 PROCEDURE — 2709999900 HC NON-CHARGEABLE SUPPLY

## 2021-10-11 PROCEDURE — 74011000250 HC RX REV CODE- 250: Performed by: SURGERY

## 2021-10-11 PROCEDURE — 77030019905 HC CATH URETH INTMIT MDII -A

## 2021-10-11 PROCEDURE — 77030005401 HC CATH RAD ARRO -A: Performed by: ANESTHESIOLOGY

## 2021-10-11 PROCEDURE — 74011250636 HC RX REV CODE- 250/636: Performed by: ANESTHESIOLOGY

## 2021-10-11 PROCEDURE — 2709999900 HC NON-CHARGEABLE SUPPLY: Performed by: SURGERY

## 2021-10-11 PROCEDURE — 74011250637 HC RX REV CODE- 250/637: Performed by: SURGERY

## 2021-10-11 PROCEDURE — 77030010512 HC APPL CLP LIG J&J -C: Performed by: SURGERY

## 2021-10-11 PROCEDURE — 77030014008 HC SPNG HEMSTAT J&J -C: Performed by: SURGERY

## 2021-10-11 PROCEDURE — 77030002986 HC SUT PROL J&J -A: Performed by: SURGERY

## 2021-10-11 PROCEDURE — 03HY32Z INSERTION OF MONITORING DEVICE INTO UPPER ARTERY, PERCUTANEOUS APPROACH: ICD-10-PCS | Performed by: SURGERY

## 2021-10-11 PROCEDURE — 74011250636 HC RX REV CODE- 250/636: Performed by: NURSE ANESTHETIST, CERTIFIED REGISTERED

## 2021-10-11 PROCEDURE — 77030040361 HC SLV COMPR DVT MDII -B: Performed by: SURGERY

## 2021-10-11 PROCEDURE — 35301 RECHANNELING OF ARTERY: CPT | Performed by: SURGERY

## 2021-10-11 PROCEDURE — 76010000172 HC OR TIME 2.5 TO 3 HR INTENSV-TIER 1: Performed by: SURGERY

## 2021-10-11 PROCEDURE — 85347 COAGULATION TIME ACTIVATED: CPT

## 2021-10-11 PROCEDURE — 82962 GLUCOSE BLOOD TEST: CPT

## 2021-10-11 PROCEDURE — 77030013794 HC KT TRNSDUC BLD EDWD -B: Performed by: NURSE ANESTHETIST, CERTIFIED REGISTERED

## 2021-10-11 PROCEDURE — 03CL0ZZ EXTIRPATION OF MATTER FROM LEFT INTERNAL CAROTID ARTERY, OPEN APPROACH: ICD-10-PCS | Performed by: SURGERY

## 2021-10-11 PROCEDURE — 77030019908 HC STETH ESOPH SIMS -A: Performed by: NURSE ANESTHETIST, CERTIFIED REGISTERED

## 2021-10-11 PROCEDURE — 77030039425 HC BLD LARYNG TRULITE DISP TELE -A: Performed by: NURSE ANESTHETIST, CERTIFIED REGISTERED

## 2021-10-11 PROCEDURE — 77030034888 HC SUT PROL 2 J&J -B: Performed by: SURGERY

## 2021-10-11 PROCEDURE — 76060000036 HC ANESTHESIA 2.5 TO 3 HR: Performed by: SURGERY

## 2021-10-11 PROCEDURE — 74011000250 HC RX REV CODE- 250: Performed by: NURSE ANESTHETIST, CERTIFIED REGISTERED

## 2021-10-11 PROCEDURE — 77030037088 HC TUBE ENDOTRACH ORAL NSL COVD-A: Performed by: NURSE ANESTHETIST, CERTIFIED REGISTERED

## 2021-10-11 DEVICE — XENOSURE BIOLOGIC PATCH, 0.8CM X 8CM, EIFU
Type: IMPLANTABLE DEVICE | Site: CAROTID | Status: FUNCTIONAL
Brand: XENOSURE BIOLOGIC PATCH

## 2021-10-11 RX ORDER — INSULIN LISPRO 100 [IU]/ML
INJECTION, SOLUTION INTRAVENOUS; SUBCUTANEOUS
Status: DISCONTINUED | OUTPATIENT
Start: 2021-10-11 | End: 2021-10-12 | Stop reason: HOSPADM

## 2021-10-11 RX ORDER — ACETAMINOPHEN 500 MG
1000 TABLET ORAL
COMMUNITY

## 2021-10-11 RX ORDER — ACETAMINOPHEN 325 MG/1
650 TABLET ORAL
Status: DISCONTINUED | OUTPATIENT
Start: 2021-10-11 | End: 2021-10-12 | Stop reason: HOSPADM

## 2021-10-11 RX ORDER — SODIUM CHLORIDE 9 MG/ML
75 INJECTION, SOLUTION INTRAVENOUS CONTINUOUS
Status: DISCONTINUED | OUTPATIENT
Start: 2021-10-11 | End: 2021-10-12 | Stop reason: HOSPADM

## 2021-10-11 RX ORDER — LIDOCAINE HYDROCHLORIDE 10 MG/ML
0.1 INJECTION INFILTRATION; PERINEURAL AS NEEDED
Status: DISCONTINUED | OUTPATIENT
Start: 2021-10-11 | End: 2021-10-11 | Stop reason: HOSPADM

## 2021-10-11 RX ORDER — FENTANYL CITRATE 50 UG/ML
100 INJECTION, SOLUTION INTRAMUSCULAR; INTRAVENOUS ONCE
Status: DISCONTINUED | OUTPATIENT
Start: 2021-10-11 | End: 2021-10-11 | Stop reason: HOSPADM

## 2021-10-11 RX ORDER — SODIUM CHLORIDE 0.9 % (FLUSH) 0.9 %
5-40 SYRINGE (ML) INJECTION AS NEEDED
Status: DISCONTINUED | OUTPATIENT
Start: 2021-10-11 | End: 2021-10-11 | Stop reason: HOSPADM

## 2021-10-11 RX ORDER — SODIUM CHLORIDE 9 MG/ML
INJECTION, SOLUTION INTRAVENOUS
Status: DISCONTINUED | OUTPATIENT
Start: 2021-10-11 | End: 2021-10-11 | Stop reason: HOSPADM

## 2021-10-11 RX ORDER — MIDAZOLAM HYDROCHLORIDE 1 MG/ML
2 INJECTION, SOLUTION INTRAMUSCULAR; INTRAVENOUS
Status: DISCONTINUED | OUTPATIENT
Start: 2021-10-11 | End: 2021-10-11 | Stop reason: HOSPADM

## 2021-10-11 RX ORDER — OXYCODONE HYDROCHLORIDE 5 MG/1
5 TABLET ORAL
Status: DISCONTINUED | OUTPATIENT
Start: 2021-10-11 | End: 2021-10-12 | Stop reason: HOSPADM

## 2021-10-11 RX ORDER — HYDROCODONE BITARTRATE AND ACETAMINOPHEN 7.5; 325 MG/1; MG/1
1 TABLET ORAL AS NEEDED
Status: DISCONTINUED | OUTPATIENT
Start: 2021-10-11 | End: 2021-10-12 | Stop reason: HOSPADM

## 2021-10-11 RX ORDER — HEPARIN SODIUM 1000 [USP'U]/ML
INJECTION, SOLUTION INTRAVENOUS; SUBCUTANEOUS AS NEEDED
Status: DISCONTINUED | OUTPATIENT
Start: 2021-10-11 | End: 2021-10-11 | Stop reason: HOSPADM

## 2021-10-11 RX ORDER — HYDROMORPHONE HYDROCHLORIDE 1 MG/ML
0.5 INJECTION, SOLUTION INTRAMUSCULAR; INTRAVENOUS; SUBCUTANEOUS
Status: DISCONTINUED | OUTPATIENT
Start: 2021-10-11 | End: 2021-10-12 | Stop reason: HOSPADM

## 2021-10-11 RX ORDER — SODIUM CHLORIDE, SODIUM LACTATE, POTASSIUM CHLORIDE, CALCIUM CHLORIDE 600; 310; 30; 20 MG/100ML; MG/100ML; MG/100ML; MG/100ML
100 INJECTION, SOLUTION INTRAVENOUS CONTINUOUS
Status: DISCONTINUED | OUTPATIENT
Start: 2021-10-11 | End: 2021-10-11 | Stop reason: HOSPADM

## 2021-10-11 RX ORDER — NEOSTIGMINE METHYLSULFATE 1 MG/ML
INJECTION, SOLUTION INTRAVENOUS AS NEEDED
Status: DISCONTINUED | OUTPATIENT
Start: 2021-10-11 | End: 2021-10-11 | Stop reason: HOSPADM

## 2021-10-11 RX ORDER — FAMOTIDINE 20 MG/1
20 TABLET, FILM COATED ORAL ONCE
Status: COMPLETED | OUTPATIENT
Start: 2021-10-11 | End: 2021-10-11

## 2021-10-11 RX ORDER — EPHEDRINE SULFATE 50 MG/ML
INJECTION, SOLUTION INTRAVENOUS AS NEEDED
Status: DISCONTINUED | OUTPATIENT
Start: 2021-10-11 | End: 2021-10-11 | Stop reason: HOSPADM

## 2021-10-11 RX ORDER — ROCURONIUM BROMIDE 10 MG/ML
INJECTION, SOLUTION INTRAVENOUS AS NEEDED
Status: DISCONTINUED | OUTPATIENT
Start: 2021-10-11 | End: 2021-10-11 | Stop reason: HOSPADM

## 2021-10-11 RX ORDER — SODIUM CHLORIDE 0.9 % (FLUSH) 0.9 %
5-40 SYRINGE (ML) INJECTION AS NEEDED
Status: DISCONTINUED | OUTPATIENT
Start: 2021-10-11 | End: 2021-10-12 | Stop reason: HOSPADM

## 2021-10-11 RX ORDER — NALOXONE HYDROCHLORIDE 0.4 MG/ML
0.1 INJECTION, SOLUTION INTRAMUSCULAR; INTRAVENOUS; SUBCUTANEOUS AS NEEDED
Status: DISCONTINUED | OUTPATIENT
Start: 2021-10-11 | End: 2021-10-12 | Stop reason: HOSPADM

## 2021-10-11 RX ORDER — ONDANSETRON 2 MG/ML
4 INJECTION INTRAMUSCULAR; INTRAVENOUS
Status: DISCONTINUED | OUTPATIENT
Start: 2021-10-11 | End: 2021-10-12 | Stop reason: HOSPADM

## 2021-10-11 RX ORDER — DEXAMETHASONE SODIUM PHOSPHATE 100 MG/10ML
INJECTION INTRAMUSCULAR; INTRAVENOUS AS NEEDED
Status: DISCONTINUED | OUTPATIENT
Start: 2021-10-11 | End: 2021-10-11 | Stop reason: HOSPADM

## 2021-10-11 RX ORDER — OXYCODONE AND ACETAMINOPHEN 5; 325 MG/1; MG/1
1 TABLET ORAL
Status: DISCONTINUED | OUTPATIENT
Start: 2021-10-11 | End: 2021-10-11

## 2021-10-11 RX ORDER — SODIUM CHLORIDE 0.9 % (FLUSH) 0.9 %
5-40 SYRINGE (ML) INJECTION EVERY 8 HOURS
Status: DISCONTINUED | OUTPATIENT
Start: 2021-10-11 | End: 2021-10-11 | Stop reason: HOSPADM

## 2021-10-11 RX ORDER — CEFAZOLIN SODIUM/WATER 2 G/20 ML
2 SYRINGE (ML) INTRAVENOUS ONCE
Status: COMPLETED | OUTPATIENT
Start: 2021-10-11 | End: 2021-10-11

## 2021-10-11 RX ORDER — SODIUM CHLORIDE 9 MG/ML
25 INJECTION, SOLUTION INTRAVENOUS CONTINUOUS
Status: DISCONTINUED | OUTPATIENT
Start: 2021-10-11 | End: 2021-10-11 | Stop reason: HOSPADM

## 2021-10-11 RX ORDER — GLYCOPYRROLATE 0.2 MG/ML
INJECTION INTRAMUSCULAR; INTRAVENOUS AS NEEDED
Status: DISCONTINUED | OUTPATIENT
Start: 2021-10-11 | End: 2021-10-11 | Stop reason: HOSPADM

## 2021-10-11 RX ORDER — SODIUM CHLORIDE 0.9 % (FLUSH) 0.9 %
5-40 SYRINGE (ML) INJECTION EVERY 8 HOURS
Status: DISCONTINUED | OUTPATIENT
Start: 2021-10-11 | End: 2021-10-12 | Stop reason: HOSPADM

## 2021-10-11 RX ORDER — MORPHINE SULFATE 2 MG/ML
2 INJECTION, SOLUTION INTRAMUSCULAR; INTRAVENOUS
Status: DISCONTINUED | OUTPATIENT
Start: 2021-10-11 | End: 2021-10-12 | Stop reason: HOSPADM

## 2021-10-11 RX ORDER — LIDOCAINE HYDROCHLORIDE 20 MG/ML
INJECTION, SOLUTION EPIDURAL; INFILTRATION; INTRACAUDAL; PERINEURAL AS NEEDED
Status: DISCONTINUED | OUTPATIENT
Start: 2021-10-11 | End: 2021-10-11 | Stop reason: HOSPADM

## 2021-10-11 RX ORDER — HEPARIN SODIUM 5000 [USP'U]/ML
INJECTION, SOLUTION INTRAVENOUS; SUBCUTANEOUS AS NEEDED
Status: DISCONTINUED | OUTPATIENT
Start: 2021-10-11 | End: 2021-10-11 | Stop reason: HOSPADM

## 2021-10-11 RX ORDER — PROPOFOL 10 MG/ML
INJECTION, EMULSION INTRAVENOUS AS NEEDED
Status: DISCONTINUED | OUTPATIENT
Start: 2021-10-11 | End: 2021-10-11 | Stop reason: HOSPADM

## 2021-10-11 RX ORDER — CEFAZOLIN SODIUM/WATER 2 G/20 ML
2 SYRINGE (ML) INTRAVENOUS EVERY 8 HOURS
Status: COMPLETED | OUTPATIENT
Start: 2021-10-11 | End: 2021-10-11

## 2021-10-11 RX ORDER — METOPROLOL SUCCINATE 50 MG/1
25 TABLET, EXTENDED RELEASE ORAL DAILY
Status: DISCONTINUED | OUTPATIENT
Start: 2021-10-12 | End: 2021-10-12 | Stop reason: HOSPADM

## 2021-10-11 RX ORDER — PROTAMINE SULFATE 10 MG/ML
INJECTION, SOLUTION INTRAVENOUS AS NEEDED
Status: DISCONTINUED | OUTPATIENT
Start: 2021-10-11 | End: 2021-10-11 | Stop reason: HOSPADM

## 2021-10-11 RX ORDER — BUPIVACAINE HYDROCHLORIDE 2.5 MG/ML
INJECTION, SOLUTION EPIDURAL; INFILTRATION; INTRACAUDAL AS NEEDED
Status: DISCONTINUED | OUTPATIENT
Start: 2021-10-11 | End: 2021-10-11 | Stop reason: HOSPADM

## 2021-10-11 RX ORDER — FENTANYL CITRATE 50 UG/ML
INJECTION, SOLUTION INTRAMUSCULAR; INTRAVENOUS AS NEEDED
Status: DISCONTINUED | OUTPATIENT
Start: 2021-10-11 | End: 2021-10-11 | Stop reason: HOSPADM

## 2021-10-11 RX ADMIN — GLYCOPYRROLATE 0.1 MG: 0.2 INJECTION, SOLUTION INTRAMUSCULAR; INTRAVENOUS at 10:46

## 2021-10-11 RX ADMIN — DEXAMETHASONE SODIUM PHOSPHATE 10 MG: 10 INJECTION INTRAMUSCULAR; INTRAVENOUS at 10:33

## 2021-10-11 RX ADMIN — EPHEDRINE SULFATE 10 MG: 50 INJECTION, SOLUTION INTRAVENOUS at 10:38

## 2021-10-11 RX ADMIN — PROPOFOL 160 MG: 10 INJECTION, EMULSION INTRAVENOUS at 10:33

## 2021-10-11 RX ADMIN — PROTAMINE SULFATE 10 MG: 10 INJECTION, SOLUTION INTRAVENOUS at 12:24

## 2021-10-11 RX ADMIN — Medication 10 ML: at 17:38

## 2021-10-11 RX ADMIN — EPHEDRINE SULFATE 10 MG: 50 INJECTION, SOLUTION INTRAVENOUS at 11:12

## 2021-10-11 RX ADMIN — CEFAZOLIN 2 G: 10 INJECTION, POWDER, FOR SOLUTION INTRAVENOUS at 17:50

## 2021-10-11 RX ADMIN — Medication 10 ML: at 21:43

## 2021-10-11 RX ADMIN — CEFAZOLIN 2 G: 1 INJECTION, POWDER, FOR SOLUTION INTRAVENOUS at 10:45

## 2021-10-11 RX ADMIN — ACETAMINOPHEN 650 MG: 325 TABLET ORAL at 17:28

## 2021-10-11 RX ADMIN — GLYCOPYRROLATE 0.1 MG: 0.2 INJECTION, SOLUTION INTRAMUSCULAR; INTRAVENOUS at 10:54

## 2021-10-11 RX ADMIN — HEPARIN SODIUM 11000 UNITS: 1000 INJECTION, SOLUTION INTRAVENOUS; SUBCUTANEOUS at 11:07

## 2021-10-11 RX ADMIN — GLYCOPYRROLATE 0.1 MG: 0.2 INJECTION, SOLUTION INTRAMUSCULAR; INTRAVENOUS at 10:50

## 2021-10-11 RX ADMIN — PHENYLEPHRINE HYDROCHLORIDE 20 MCG/MIN: 10 INJECTION INTRAVENOUS at 11:12

## 2021-10-11 RX ADMIN — SODIUM CHLORIDE 75 ML/HR: 900 INJECTION, SOLUTION INTRAVENOUS at 14:00

## 2021-10-11 RX ADMIN — ROCURONIUM BROMIDE 40 MG: 10 INJECTION, SOLUTION INTRAVENOUS at 10:33

## 2021-10-11 RX ADMIN — INSULIN LISPRO 4 UNITS: 100 INJECTION, SOLUTION INTRAVENOUS; SUBCUTANEOUS at 21:46

## 2021-10-11 RX ADMIN — PROTAMINE SULFATE 10 MG: 10 INJECTION, SOLUTION INTRAVENOUS at 12:27

## 2021-10-11 RX ADMIN — HEPARIN SODIUM 4000 UNITS: 1000 INJECTION, SOLUTION INTRAVENOUS; SUBCUTANEOUS at 11:52

## 2021-10-11 RX ADMIN — FAMOTIDINE 20 MG: 20 TABLET ORAL at 08:26

## 2021-10-11 RX ADMIN — LIDOCAINE HYDROCHLORIDE 80 MG: 20 INJECTION, SOLUTION EPIDURAL; INFILTRATION; INTRACAUDAL; PERINEURAL at 10:33

## 2021-10-11 RX ADMIN — Medication 5 MG: at 12:46

## 2021-10-11 RX ADMIN — PROTAMINE SULFATE 10 MG: 10 INJECTION, SOLUTION INTRAVENOUS at 12:26

## 2021-10-11 RX ADMIN — PROTAMINE SULFATE 10 MG: 10 INJECTION, SOLUTION INTRAVENOUS at 12:25

## 2021-10-11 RX ADMIN — PROTAMINE SULFATE 10 MG: 10 INJECTION, SOLUTION INTRAVENOUS at 12:28

## 2021-10-11 RX ADMIN — SODIUM CHLORIDE, SODIUM LACTATE, POTASSIUM CHLORIDE, AND CALCIUM CHLORIDE 100 ML/HR: 600; 310; 30; 20 INJECTION, SOLUTION INTRAVENOUS at 08:28

## 2021-10-11 RX ADMIN — CEFAZOLIN 2 G: 10 INJECTION, POWDER, FOR SOLUTION INTRAVENOUS at 21:43

## 2021-10-11 RX ADMIN — FENTANYL CITRATE 100 MCG: 50 INJECTION INTRAMUSCULAR; INTRAVENOUS at 10:27

## 2021-10-11 RX ADMIN — EPHEDRINE SULFATE 10 MG: 50 INJECTION, SOLUTION INTRAVENOUS at 10:41

## 2021-10-11 RX ADMIN — EPHEDRINE SULFATE 10 MG: 50 INJECTION, SOLUTION INTRAVENOUS at 10:54

## 2021-10-11 RX ADMIN — SODIUM CHLORIDE: 900 INJECTION, SOLUTION INTRAVENOUS at 10:33

## 2021-10-11 RX ADMIN — EPHEDRINE SULFATE 10 MG: 50 INJECTION, SOLUTION INTRAVENOUS at 11:08

## 2021-10-11 RX ADMIN — GLYCOPYRROLATE 0.8 MG: 0.2 INJECTION, SOLUTION INTRAMUSCULAR; INTRAVENOUS at 12:46

## 2021-10-11 RX ADMIN — GLYCOPYRROLATE 0.1 MG: 0.2 INJECTION, SOLUTION INTRAMUSCULAR; INTRAVENOUS at 10:57

## 2021-10-11 RX ADMIN — INSULIN LISPRO 4 UNITS: 100 INJECTION, SOLUTION INTRAVENOUS; SUBCUTANEOUS at 18:10

## 2021-10-11 RX ADMIN — GLYCOPYRROLATE 0.1 MG: 0.2 INJECTION, SOLUTION INTRAMUSCULAR; INTRAVENOUS at 10:43

## 2021-10-11 NOTE — PERIOP NOTES
TRANSFER - OUT REPORT:    Verbal report given to [facility] RN(name) on Gigi Aguayo  being transferred to CVICU(unit) for routine post - op       Report consisted of patients Situation, Background, Assessment and   Recommendations(SBAR). Information from the following report(s) SBAR and OR Summary was reviewed with the receiving nurse. Lines:   Peripheral IV 10/11/21 Posterior;Right Hand (Active)   Site Assessment Clean, dry, & intact 10/11/21 0827   Dressing Status Clean, dry, & intact; New 10/11/21 0827   Dressing Type Tape;Transparent 10/11/21 0827   Hub Color/Line Status Green; Infusing 10/11/21 0827       Peripheral IV 10/11/21 Left Arm (Active)       Arterial Line 10/11/21 Left Radial artery (Active)        Opportunity for questions and clarification was provided.       Patient transported with:   Monitor  O2 @ 4 liters  Registered Nurse   CRNA

## 2021-10-11 NOTE — PROGRESS NOTES
10/11/21 1828   Neuro   Neurologic State Alert   Assessment L Pupil Brisk;Round   Size L Pupil (mm) 4   Assessment R Pupil Brisk;Round   Size R Pupil (mm) 4   [place] Coma Scale   Eye Opening 4   Best Verbal Response 5   Best Motor Response 6   [place] Coma Scale Score 15   NIH Stroke Scale   Interval Handoff/Transfer   Level of Conciousness (1a) 0   LOC Questions (1b) 0   LOC Commands (1c) 0   Best Gaze (2) 0   Visual (3) 0   Facial Palsy (4) 0   Motor Arm, Left (5a) 0   Motor Arm, Right (5b) 0   Motor Leg, Left (6a) 0   Motor Leg, Right (6b) 0   Limb Ataxia (7) 0   Sensory (8) 0   Best Language (9) 0   Dysarthria (10) 0   Extinction and Inattention (11) 0   Total 0

## 2021-10-11 NOTE — PROGRESS NOTES
TRANSFER - IN REPORT:    Verbal report received from Mynor Saleh RN(name) on Yadira Music  being received from OR(unit) for routine post - op      Report consisted of patients Situation, Background, Assessment and   Recommendations(SBAR). Information from the following report(s) SBAR, OR Summary, Intake/Output, MAR and Cardiac Rhythm SR was reviewed with the receiving nurse. Opportunity for questions and clarification was provided. Assessment completed upon patients arrival to unit and care assumed.

## 2021-10-11 NOTE — PROGRESS NOTES
Noted pt's left neck more swollen even with continued ice pack to neck. Still has a firm border at the incision site, no change, but noted more swelling anterior to incision site. Trach midline and swallows without difficulty, \"it just hurts a little bit when I swallow\". Dr Jorge Corcoran aware and to continue ice and make it more compressive with tieing around neck if needed. Also noted, unable to get a temp oral/axillary/ temporal. Skin slightly cool, warm blankets applied and around top of head as well. Will monitor.

## 2021-10-11 NOTE — ANESTHESIA PREPROCEDURE EVALUATION
Relevant Problems   No relevant active problems       Anesthetic History               Review of Systems / Medical History  Patient summary reviewed and pertinent labs reviewed    Pulmonary        Sleep apnea  Smoker (Former)         Neuro/Psych       CVA (9/1/21 slurred speech and left sided facial droop, resolved)       Cardiovascular    Hypertension        Dysrhythmias : atrial fibrillation  CAD, PAD (Carotid artery disease) and hyperlipidemia      Comments: TTE; 9/2021:  · LV: Estimated LVEF is 55 - 60%. Normal cavity size, wall thickness, systolic function (ejection fraction normal) and diastolic function. Wall motion: normal.  · AV: Mild aortic valve regurgitation is present. · Saline contrast was given to evaluate for intracardiac shunt.  There was no evidence of intracardiac shunting  · Echo study was technically difficulty   GI/Hepatic/Renal                Endo/Other    Diabetes: type 2  Hyperthyroidism       Other Findings   Comments: Glaucoma         Physical Exam    Airway  Mallampati: III  TM Distance: > 6 cm  Neck ROM: normal range of motion   Mouth opening: Normal     Cardiovascular  Regular rate and rhythm,  S1 and S2 normal,  no murmur, click, rub, or gallop             Dental  No notable dental hx       Pulmonary  Breath sounds clear to auscultation               Abdominal         Other Findings            Anesthetic Plan    ASA: 3  Anesthesia type: general    Monitoring Plan: Arterial line        Anesthetic plan and risks discussed with: Patient

## 2021-10-11 NOTE — PROGRESS NOTES
's visit with patient's spouse in the surgery waiting room to offer support while patient was in OR/PACU.      Jaspreet Whelan 69  Board Certified

## 2021-10-11 NOTE — BRIEF OP NOTE
[place] [id number]  [phone] FAX: [phone]    Brief Op Note Template Note    Pre-Op Diagnosis: Stenosis of left carotid artery [I65.22]    Post-Op Diagnosis:  Stenosis of left carotid artery [I65.22]    Procedures: Procedure(s):  CAROTID ARTERY ENDARTERECTOMY LEFT    Surgeon: Aure Blunt MD    Assistants: Surgeon(s): Ivana Briscoe MD      Anesthesia:  General     Findings: Left ICA plaque    Tourniquet Time:  * No tourniquets in log *    Estimated Blood Loss:               Specimens:            Implants:    Implant Name Type Inv. Item Serial No.  Lot No. LRB No. Used Action   GRAFT VASC W0.8XL8CM THK0.35-0.75MM CAR PERICARD PROC BOV -   GRAFT VASC W0.8XL8CM THK0.35-0.75MM CAR PERICARD PROC BOV 0000 [facility] [employer]_WD [id number] Left 1 Implanted       Complications: None               Signed: Aure Blunt MD      Elements of this note have been dictated using speech recognition software. As a result, errors of speech recognition may have occurred.

## 2021-10-11 NOTE — ANESTHESIA PROCEDURE NOTES
Arterial Line Placement    Start time: 10/11/2021 9:08 AM  End time: 10/11/2021 9:15 AM  Performed by: Isela Goldstein CRNA  Authorized by: Arlen Aguilar MD     Pre-Procedure  Indications:  Arterial pressure monitoring and blood sampling  Preanesthetic Checklist: patient identified, risks and benefits discussed, anesthesia consent, site marked, patient being monitored, timeout performed and patient being monitored    Timeout Time: 09:08 EDT        Procedure:   Prep:  ChloraPrep  Seldinger Technique?: Yes    Orientation:  Left  Location:  Radial artery  Catheter size:  20 G  Number of attempts:  2  Cont Cardiac Output Sensor: No      Assessment:   Post-procedure:  Line secured and sterile dressing applied  Patient Tolerance:  Patient tolerated the procedure well with no immediate complications  Comment:   Left arm prepped with ChloraPrep, 0.8ml of 1% lidocaine infiltrated at skin, Seldinger technique, good blood return, unable to thread catheter over wire manual pressure held until hemostasis achieved. Site 3cm proximal chosen 0.8ml of 1% lidocaine infiltrated at skin, Seldinger technique, good blood return, good waveform.

## 2021-10-12 VITALS
OXYGEN SATURATION: 95 % | DIASTOLIC BLOOD PRESSURE: 78 MMHG | HEART RATE: 88 BPM | BODY MASS INDEX: 29.13 KG/M2 | TEMPERATURE: 97.8 F | SYSTOLIC BLOOD PRESSURE: 118 MMHG | HEIGHT: 68 IN | RESPIRATION RATE: 24 BRPM | WEIGHT: 192.2 LBS

## 2021-10-12 LAB
GLUCOSE BLD STRIP.AUTO-MCNC: 220 MG/DL (ref 65–100)
SERVICE CMNT-IMP: ABNORMAL

## 2021-10-12 PROCEDURE — 74011636637 HC RX REV CODE- 636/637: Performed by: NURSE PRACTITIONER

## 2021-10-12 PROCEDURE — 82962 GLUCOSE BLOOD TEST: CPT

## 2021-10-12 PROCEDURE — 74011000250 HC RX REV CODE- 250

## 2021-10-12 PROCEDURE — 74011250637 HC RX REV CODE- 250/637: Performed by: SURGERY

## 2021-10-12 PROCEDURE — 2709999900 HC NON-CHARGEABLE SUPPLY

## 2021-10-12 RX ORDER — OXYCODONE AND ACETAMINOPHEN 5; 325 MG/1; MG/1
1 TABLET ORAL
Qty: 30 TABLET | Refills: 0 | Status: SHIPPED | OUTPATIENT
Start: 2021-10-12 | End: 2021-10-15

## 2021-10-12 RX ADMIN — METOPROLOL SUCCINATE 25 MG: 50 TABLET, EXTENDED RELEASE ORAL at 08:10

## 2021-10-12 RX ADMIN — Medication 10 ML: at 06:39

## 2021-10-12 RX ADMIN — INSULIN LISPRO 4 UNITS: 100 INJECTION, SOLUTION INTRAVENOUS; SUBCUTANEOUS at 08:10

## 2021-10-12 NOTE — PROGRESS NOTES
Discharge instructions reviewed with patient and discussed with patient's wife via telephone. Medications, activity restrictions, appointments and incision care discussed. Time allowed for questions. Patient escorted to discharge area via wheelchair where his wife drove him home.

## 2021-10-12 NOTE — ANESTHESIA POSTPROCEDURE EVALUATION
Procedure(s):  CAROTID ARTERY ENDARTERECTOMY LEFT. general    Anesthesia Post Evaluation      Multimodal analgesia: multimodal analgesia used between 6 hours prior to anesthesia start to PACU discharge  Patient location during evaluation: PACU  Patient participation: complete - patient participated  Level of consciousness: awake and alert  Pain management: adequate  Airway patency: patent  Anesthetic complications: no  Cardiovascular status: acceptable  Respiratory status: acceptable  Hydration status: acceptable  Post anesthesia nausea and vomiting:  controlled  Final Post Anesthesia Temperature Assessment:  Normothermia (36.0-37.5 degrees C)      INITIAL Post-op Vital signs:   Vitals Value Taken Time   /78 10/12/21 0802   Temp 36.6 °C (97.8 °F) 10/12/21 0703   Pulse 107 10/12/21 0837   Resp 118 10/12/21 0837   SpO2 97 % 10/12/21 0633   Vitals shown include unvalidated device data.

## 2021-10-12 NOTE — PROGRESS NOTES
Call patient's wife to update on status. Patient has some mild hematoma on surgical site. He has no difficulty swallowing no airway issues. Discussed with wife that if he wants to stay another day I am okay with that. Clinically I think he will be okay to go home. We will continue to ice packs. Follow-up in 2 weeks. Discussed with her about holding the Eliquis for 5 days until Sunday.     Pedro Juan

## 2021-10-12 NOTE — PROGRESS NOTES
10/12/21 0658   NIH Stroke Scale   Interval Handoff/Transfer   Level of Conciousness (1a) 0   LOC Questions (1b) 0   LOC Commands (1c) 0   Best Gaze (2) 0   Visual (3) 0   Facial Palsy (4) 0   Motor Arm, Left (5a) 0   Motor Arm, Right (5b) 0   Motor Leg, Left (6a) 0   Motor Leg, Right (6b) 0   Limb Ataxia (7) 0   Sensory (8) 0   Best Language (9) 0   Dysarthria (10) 0   Extinction and Inattention (11) 0   Total 0

## 2021-10-12 NOTE — DISCHARGE SUMMARY
[address]   [address] [facility] FAX: [phone]         VASCULAR SURGERY FLOOR PROGRESS NOTE    Admit Date: 10/11/2021  POD: 1 Day Post-Op    Procedure:  Procedure(s):  CAROTID ARTERY ENDARTERECTOMY LEFT    Subjective:     Patient has no new complaints. Objective:     Vitals:  Blood pressure 94/64, pulse 94, temperature 97 °F (36.1 °C), resp. rate 18, height 5' 8\" (1.727 m), weight 192 lb 3.2 oz (87.2 kg), SpO2 94 %. Temp (24hrs), Av.2 °F (36.2 °C), Min:96.7 °F (35.9 °C), Max:97.8 °F (36.6 °C)      Intake / Output:    Intake/Output Summary (Last 24 hours) at 10/12/2021 0629  Last data filed at 10/12/2021 0545  Gross per 24 hour   Intake 1600 ml   Output 1400 ml   Net 200 ml       Physical Exam:    Constitutional: he appears well-developed. No distress. HENT:   Head: Atraumatic. Eyes: Pupils are equal, round, and reactive to light. Neck: Normal range of motion. Cardiovascular: Regular rhythm. Pulmonary/Chest: Effort normal and breath sounds normal. No respiratory distress. Abdominal: Soft. Bowel sounds are normal. he exhibits no distension. There is no tenderness. There is no guarding. No hernia. Musculoskeletal: Normal range of motion. Neurological: He is alert. CN II- XII grossly intact  Vascular:neruo intact mild swelling    Labs: No results for input(s): HGB, WBC, K, GLU, HGBEXT in the last 72 hours.     No lab exists for component:  CREA    Data Review     Assessment:     Patient Active Problem List    Diagnosis Date Noted    Carotid stenosis, left 10/11/2021    Left carotid artery stenosis 2021    [facility]A (cerebral vascular accident) ([facility] 2021    [facility]    Hypertension 2016    Diabetes mellitus type 2, controlled ([facility] 2016    High risk medication use 2016    CAD (coronary artery disease) 2016    Hearing difficulty of right ear 2016    IBS (irritable bowel syndrome) 09/29/2016    GERD (gastroesophageal reflux disease) 09/29/2016    Osteoporosis 09/29/2016    Hyponatremia 09/29/2016    Irregular heart beat 09/29/2016    Plantar wart of left foot 09/29/2016    Sinus bradycardia 09/29/2016    Glaucoma 09/29/2016    Intertrigo 09/29/2016    Carotid artery disease ([address] 09/29/2016    Obstructive sleep apnea of adult 09/29/2016    ED (erectile dysfunction) 09/29/2016    Encounter for monitoring testosterone replacement therapy 09/29/2016    Aftercare for long-term (current) use of antiplatelets/antithrombotics 09/29/2016    Anemia 09/29/2016    Palpitation 03/10/2016    Dyslipidemia 03/10/2016       Plan/Recommendations/Medical Decision Making:     D/c home   Restart eliqus 10/17    Elements of this note have been dictated using speech recognition software. As a result, errors of speech recognition may have occurred.

## 2021-10-12 NOTE — DISCHARGE INSTRUCTIONS
Patient Education        Carotid Endarterectomy: What to Expect at Home  Your Recovery  A carotid endarterectomy (say \"whitney-RAW-umair lynkj-izm-xsj-FILEMON-candida-li\") is surgery to remove fatty build-up (plaque) from one of the carotid arteries. Your doctor made a cut (incision) in your neck and carotid artery to take out the plaque. You may have a sore throat for a few days. You can expect the incision to be sore for about a week. The area around it may also be swollen and bruised at first. The area in front of the incision may be numb. This usually gets better after 6 to 12 months. Your doctor closed the incision in your neck with stitches. The stitches will be removed 7 to 10 days after surgery, or you may have stitches that dissolve on their own. You may feel more tired than usual for several weeks after surgery. You will probably be able to go back to work or your usual activities in 1 to 2 weeks. This care sheet gives you a general idea about how long it will take for you to recover. But each person recovers at a different pace. Follow the steps below to feel better as quickly as possible. How can you care for yourself at home? Activity    · Rest when you feel tired. Getting enough sleep will help you recover.     · Try to walk each day. Start by walking a little more than you did the day before. Bit by bit, increase the amount you walk. Walking boosts blood flow and helps prevent pneumonia and constipation.     · Avoid strenuous activities, such as bicycle riding, jogging, weight lifting, or aerobic exercise. Your doctor will tell you when it's okay to do strenuous activity.     · For 1 to 2 weeks, avoid lifting anything that would make you strain. This may include a child, heavy grocery bags and milk containers, a heavy briefcase or backpack, cat litter or dog food bags, or a vacuum .     · Ask your doctor when you can drive again.     · You will probably need to take 1 to 2 weeks off from work.  It depends on the type of work you do and how you feel.     · You may shower and take baths as usual. But do not soak the incision for the first 2 weeks, or until your doctor tells you it is okay. Pat the incision dry.     · Your doctor will tell you when you can have sex again. Diet    · You can eat your normal diet. If your stomach is upset, try bland, low-fat foods like plain rice, broiled chicken, toast, and yogurt.     · Drink plenty of fluids (unless your doctor tells you not to).     · You may notice that your bowel movements are not regular right after your surgery. This is common. Try to avoid constipation and straining with bowel movements. You may want to take a fiber supplement every day. If you have not had a bowel movement after a couple of days, ask your doctor about taking a mild laxative. Medicines    · Your doctor will tell you if and when you can restart your medicines. He or she will also give you instructions about taking any new medicines.     · If you take aspirin or some other blood thinner, ask your doctor if and when to start taking it again. Make sure that you understand exactly what your doctor wants you to do.     · Your doctor may advise you to take aspirin when you go home. This helps prevent blood clots. Take your medicines exactly as prescribed. Call your doctor if you think you are having a problem with your medicine.     · Be safe with medicines. Take pain medicines exactly as directed. ? If the doctor gave you a prescription medicine for pain, take it as prescribed. ? If you are not taking a prescription pain medicine, ask your doctor if you can take an over-the-counter medicine. ? Do not take two or more pain medicines at the same time unless the doctor told you to. Many pain medicines have acetaminophen, which is Tylenol.  Too much acetaminophen (Tylenol) can be harmful.     · If you think your pain medicine is making you sick to your stomach:  ? Take your medicine after meals (unless your doctor has told you not to). ? Ask your doctor for a different pain medicine.     · If your doctor prescribed antibiotics, take them as directed. Do not stop taking them just because you feel better. You need to take the full course of antibiotics.     · If you take a blood thinner, such as aspirin, be sure you get instructions about how to take your medicine safely. Blood thinners can cause serious bleeding problems. Incision care    · If you have strips of tape over your incision, leave the tape on for a week or until it falls off.     · Wash the area daily with water and pat it dry. Other cleaning products, such as hydrogen peroxide, can make the wound heal more slowly. You may cover the area with a gauze bandage if it weeps or rubs against clothing. Change the bandage every day.     · Keep the area clean and dry. Follow-up care is a key part of your treatment and safety. Be sure to make and go to all appointments, and call your doctor if you are having problems. It's also a good idea to know your test results and keep a list of the medicines you take. When should you call for help? Call 911 anytime you think you may need emergency care. For example, call if:    · You passed out (lost consciousness).     · You have severe trouble breathing.     · You have a tight bulge in your neck on the side where the surgery was done.     · You have symptoms of a stroke. These may include:  ? Sudden numbness, tingling, weakness, or loss of movement in your face, arm, or leg, especially on only one side of your body. ? Sudden vision changes. ? Sudden trouble speaking. ? Sudden confusion or trouble understanding simple statements. ? Sudden problems with walking or balance. ? A sudden, severe headache that is different from past headaches.     · You have chest pain or pressure. This may occur with:  ? Sweating. ? Shortness of breath. ? Nausea or vomiting.   ? Pain that spreads from the chest to the neck, jaw, or one or both shoulders or arms. ? Dizziness or lightheadedness. ? A fast or uneven pulse. After calling 911, chew 1 adult-strength or 2 to 4 low-dose aspirin. Wait for an ambulance. Do not try to drive yourself. Call your doctor now or seek immediate medical care if:    · You have pain that does not get better after you take pain medicine.     · You have loose stitches, or your incision comes open.     · Bright red blood has soaked through the bandage over your incision.     · You have signs of infection, such as:  ? Increased pain, swelling, warmth, or redness. ? Red streaks leading from the incision. ? Pus draining from the incision. ? A fever. Watch closely for any changes in your health, and be sure to contact your doctor if you have any problems. Where can you learn more? Go to http://www.gray.com/  Enter D902 in the search box to learn more about \"Carotid Endarterectomy: What to Expect at Home. \"  Current as of: April 29, 2021               Content Version: 13.0  © 5710-7189 [employer]. Care instructions adapted under license by [employer] (which disclaims liability or warranty for this information). If you have questions about a medical condition or this instruction, always ask your healthcare professional. [place] [address] any warranty or liability for your use of this information.

## 2021-10-12 NOTE — OP NOTES
[address] [facility], [place]  OPERATIVE REPORT    Name:  Tatum Robledo  MR#:  [id number]  :  1945  ACCOUNT #:  [de-identified]  DATE OF SERVICE:  10/11/2021    CLINICAL SERVICE:  Vascular Surgery. PREOPERATIVE DIAGNOSIS:  Left carotid stenosis, greater than 90%. POSTOPERATIVE DIAGNOSIS:  Left carotid stenosis, greater than 90%. PROCEDURES PERFORMED:  1. Left carotid endarterectomy, bovine pericardial patch closure. 2.  Completion carotid duplex study. SURGEON:  Ijeoma Caban MD    ASSISTANT:  None. ANESTHESIA:  General.    COMPLICATIONS:  None. SPECIMENS REMOVED:  Left plaque. IMPLANTS:  None. ESTIMATED BLOOD LOSS:  None. INDICATION FOR PROCEDURE:  This is a 52-year-old male who presented with asymptomatic left carotid stenosis greater than 90%. I had a long discussion with the patient and we elected to proceed with endarterectomy. PROCEDURE:  After getting informed consent, the patient was brought to the operating room. Anesthesia was then induced. Preop antibiotics given before skin incision. The patient's left neck was then prepped and draped in normal sterile fashion. Incision was made on the anterior border of the sternocleidomastoid. We dissected down through the subcutaneous tissue and the platysma. Using self-retaining retractor, we exposed the carotid sheath. The internal jugular vein was identified. The facial vein was identified and suture ligated with 2-0 silk ties. This exposed the vagus nerve, which was kept out of harm's way. We got control of the common carotid artery with Romain tourniquets. The external was also dissected and controlled with Romain tourniquets. Superior thyroid was dissected and controlled with vessel loops. On distal dissection, we did identify the hypoglossal nerve and kept out of harm's way. The distal ICA was also controlled with vessel loops. We then heparinized the patient with 13,000 units of heparin. ACT was 350. We then got proximal and distal control with clamps. An 11-blade was used to do an arteriotomy. We extended with Claire scissors to heavily diseased posterior plaque on the proximal ICA to normal-appearing intima. There was good backbleeding. We then put a #10 shunt into the ICA and into the common. We checked it for Doppler signals, it was patent. We then started endarterectomy plane in distal common with a Davenport elevator removing the plaque. We used eversion technique to remove the plaque from external.  A Davenport elevator was used to remove the plaque from the proximal ICA leaving a smooth endpoint. All remaining fine debris was removed with fine forceps. We then heparinized the patch with hep saline and dextran. We then brought to the field a bovine pericardial patch 0.8 x 8 cm. We did a patch angioplasty using 6-0 Prolene distally. Ran it on both sides and distally to proximally. Prior to tying that, we removed the shunt. We back flushed the ICA, we back flushed external, forward flushed the common. We finished our anastomosis restoring flow first into the external then we restored flow to the ICA. The patient had good Doppler signal on ICA, external and common. Completion duplex study showed adequate waveform in the common, external and ICA with B-mode showing no evidence of any thrombosis or dissection. We then reversed the patient with 50 mg of protamine. We held pressure for five minutes. We closed with 2-0 Vicryl, 3-0 Vicryl and 4-0 Monocryl. The patient was extubated and taken to the PACU in stable condition.       Bay Ronquillo MD      [id number]  D:  10/11/2021 12:58  T:  10/11/2021 23:07  JOB #:  [id number]

## 2022-02-03 ENCOUNTER — APPOINTMENT (OUTPATIENT)
Dept: CT IMAGING | Age: 77
End: 2022-02-03
Attending: STUDENT IN AN ORGANIZED HEALTH CARE EDUCATION/TRAINING PROGRAM
Payer: MEDICARE

## 2022-02-03 ENCOUNTER — HOSPITAL ENCOUNTER (EMERGENCY)
Age: 77
Discharge: HOME | End: 2022-02-03
Attending: STUDENT IN AN ORGANIZED HEALTH CARE EDUCATION/TRAINING PROGRAM
Payer: MEDICARE

## 2022-02-03 VITALS
SYSTOLIC BLOOD PRESSURE: 156 MMHG | HEIGHT: 68 IN | BODY MASS INDEX: 28.04 KG/M2 | WEIGHT: 185 LBS | OXYGEN SATURATION: 95 % | HEART RATE: 83 BPM | DIASTOLIC BLOOD PRESSURE: 82 MMHG | TEMPERATURE: 98 F | RESPIRATION RATE: 17 BRPM

## 2022-02-03 DIAGNOSIS — S01.511A LIP LACERATION, INITIAL ENCOUNTER: ICD-10-CM

## 2022-02-03 DIAGNOSIS — S02.5XXA CLOSED FRACTURE OF TOOTH, INITIAL ENCOUNTER: ICD-10-CM

## 2022-02-03 DIAGNOSIS — S01.81XA FACIAL LACERATION, INITIAL ENCOUNTER: ICD-10-CM

## 2022-02-03 DIAGNOSIS — S09.90XA CLOSED HEAD INJURY, INITIAL ENCOUNTER: Primary | ICD-10-CM

## 2022-02-03 DIAGNOSIS — S03.2XXA TOOTH AVULSION, INITIAL ENCOUNTER: ICD-10-CM

## 2022-02-03 PROCEDURE — 72125 CT NECK SPINE W/O DYE: CPT

## 2022-02-03 PROCEDURE — 75810000293 HC SIMP/SUPERF WND  RPR

## 2022-02-03 PROCEDURE — 70486 CT MAXILLOFACIAL W/O DYE: CPT

## 2022-02-03 PROCEDURE — 99284 EMERGENCY DEPT VISIT MOD MDM: CPT

## 2022-02-03 PROCEDURE — 70450 CT HEAD/BRAIN W/O DYE: CPT

## 2022-02-04 NOTE — DISCHARGE INSTRUCTIONS
It is imperative that you arrange follow-up with your dentist or the oral surgery specialist listed for further evaluation of your chipped and missing tooth. Return to this department for removal of your sutures in 5 days. Clean area daily with soap and water.   Return sooner for worsening symptoms, concerns or questions

## 2022-02-04 NOTE — ED NOTES
Pt's wounds cleaned and covered with non-adherent dressing and paper tape.  neosporin was applied to laceration under nose

## 2022-02-04 NOTE — ED NOTES
I have reviewed discharge instructions with the patient. The patient verbalized understanding. Patient left ED via Discharge Method: ambulatory to Home   Opportunity for questions and clarification provided. Patient given 0 scripts. To continue your aftercare when you leave the hospital, you may receive an automated call from our care team to check in on how you are doing. This is a free service and part of our promise to provide the best care and service to meet your aftercare needs.  If you have questions, or wish to unsubscribe from this service please call [phone]. Thank you for Choosing our [facility] Emergency Department.

## 2022-02-04 NOTE — ED PROVIDER NOTES
43-year-old male patient presenting to the ER via EMS after mechanical fall while walking his dog. Patient states he was pulled forward by his dog lost his balance and struck his face against a hard surface. He describes a gravel road where this fall occurred. He denies loss of consciousness. He reports no significant pain but did lose a tooth during the fall. He is on Eliquis for atrial fibrillation and took this medication earlier today. Patient's last tetanus shot occurred 1 year ago. Reports mild discomfort in his face and right hand. Denies neck pain or stiffness and reports no numbness, tingling or weakness.   Reports no nausea, vomiting or syncope           Past Medical History:   Diagnosis Date    Acute maxillary sinusitis     Acute pharyngitis     Aftercare for long-term (current) use of antiplatelets/antithrombotics 9/29/2016    Allergic rhinitis     Anemia 9/29/2016    Backache     CAD (coronary artery disease) 9/29/2016    Carotid artery disease (HCC) 9/29/2016    Chronic atrial fibrillation (HCC)     Decreased libido     Diabetes ([facility] [address] 9/29/2016    type 2, does not check blood sugar at home    Dizziness and giddiness     ED (erectile dysfunction) 9/29/2016    Encounter for monitoring testosterone replacement therapy 9/29/2016    Fatigue     Fever blister     GERD (gastroesophageal reflux disease)     controlled with nexium    Glaucoma 9/29/2016    Hearing difficulty of right ear 9/29/2016    High cholesterol     High risk medication use 9/29/2016    Hypertension 9/29/2016    managed with meds    Hyponatremia 9/29/2016    IBS (irritable bowel syndrome) 9/29/2016    Intertrigo 9/29/2016    Irregular heart beat 09/29/2016    Left shoulder pain     Mouth pain     Obstructive sleep apnea of adult 9/29/2016    Osteoporosis 9/29/2016    Plantar wart of left foot 9/29/2016    Rotator cuff tear     Suspected Diagnosis    Sinus bradycardia 9/29/2016    Sinusitis     Skin infection     Sore throat     Special screening for malignant neoplasm of prostate     Stroke ([facility] [address] 2021    slurred speech and left sided facial droop, resolved    Subclinical hyperthyroidism     Tinea corporis        Past Surgical History:   Procedure Laterality Date    HX COLONOSCOPY      HX UROLOGICAL      age 17,--urethra narrowing. Magdy Aldrich Had bladder infection/?incomplete emptying    VASCULAR SURGERY PROCEDURE UNLIST  10/11/2021    Left carotid endarterectomy, bovine pericardial patch closure. Family History:   Problem Relation Age of Onset    Cancer Father         hodgkin's lymphoma    Sudden Death Father         age,68, presumed MI, heavey smoker       Social History     Socioeconomic History    Marital status:      Spouse name: Not on file    Number of children: Not on file    Years of education: Not on file    Highest education level: Not on file   Occupational History    Not on file   Tobacco Use    Smoking status: Former Smoker     Quit date: 3/10/1978     Years since quittin.9    Smokeless tobacco: Never Used    Tobacco comment: quit 1978 new year's [employer]   Vaping Use    Vaping Use: Never used   Substance and Sexual Activity    Alcohol use: Yes     Alcohol/week: 7.0 standard drinks     Types: 7 Glasses of wine per week     Comment: daily beer    Drug use: No    Sexual activity: Yes     Partners: Female   Other Topics Concern    Not on file   Social History Narrative    Not on file     Social Determinants of Health     Financial Resource Strain:     Difficulty of Paying Living Expenses: Not on file   Food Insecurity:     Worried About Running Out of Food in the Last Year: Not on file    [place]ell of Food in the Last Year: Not on file   Transportation Needs:     Lack of Transportation (Medical): Not on file    Lack of Transportation (Non-Medical):  Not on file   Physical Activity:     Days of Exercise per Week: Not on file    Minutes of Exercise per Session: Not on file   Stress:     Feeling of Stress : Not on file   Social Connections:     Frequency of Communication with Friends and Family: Not on file    Frequency of Social Gatherings with Friends and Family: Not on file    Attends Orthodoxy Services: Not on file    Active Member of Clubs or Organizations: Not on file    Attends Club or Organization Meetings: Not on file    Marital Status: Not on file   Intimate Partner Violence:     Fear of Current or Ex-Partner: Not on file    Emotionally Abused: Not on file    Physically Abused: Not on file    Sexually Abused: Not on file   Housing Stability:     Unable to Pay for Housing in the Last Year: Not on file    Number of [place] in the Last Year: Not on file    Unstable Housing in the Last Year: Not on file         ALLERGIES: Patient has no known allergies. Review of Systems   Constitutional: Negative for chills, diaphoresis and fever. HENT: Negative for congestion, sneezing and sore throat. Eyes: Negative for visual disturbance. Respiratory: Negative for cough, chest tightness, shortness of breath and wheezing. Cardiovascular: Negative for chest pain and leg swelling. Gastrointestinal: Negative for abdominal pain, blood in stool, diarrhea, nausea and vomiting. Endocrine: Negative for polyuria. Genitourinary: Negative for difficulty urinating, dysuria, flank pain, hematuria and urgency. Musculoskeletal: Positive for joint swelling. Negative for back pain, myalgias, neck pain and neck stiffness. Skin: Positive for wound. Negative for color change and rash. Neurological: Positive for headaches. Negative for dizziness, syncope, speech difficulty, weakness, light-headedness and numbness. Psychiatric/Behavioral: Negative for behavioral problems. All other systems reviewed and are negative. There were no vitals filed for this visit. Physical Exam  Vitals and nursing note reviewed.    Constitutional:       General: He is not in acute distress. Appearance: He is well-developed. He is not diaphoretic. Comments: Alert and oriented to person place and time. No acute distress, speaks in clear, fluid sentences. HENT:      Head: Normocephalic. Comments: Significant ecchymosis surrounds the patient's right eye obstructing the visualization of his cornea and pupil. There is a avulsion type injury to the area just to the right of the right nare. This measures approximately 0.5 cm in length. .25 cm laceration to the upper lip along midline. No obvious peacock sign     Right Ear: External ear normal.      Left Ear: External ear normal.      Nose: Nose normal.      Mouth/Throat:      Mouth: Mucous membranes are moist.        Comments: 0.25 cm laceration to the middle of the patient's top lip does cross the vermilion border. Superficial in nature    Right front tooth is partially chipped without obvious pulp exposure. Patient's right upper canine is absent, bleeding controlled. Under the lip is noted a avulsion type injury that does not pass through to the surface    There is a 0.5 cm crescent-shaped avulsion/laceration to the area just lateral to the right nare. Eyes:      Pupils: Pupils are equal, round, and reactive to light. Neck:      Comments: Cervical spine collar is in place. Able to palpate patient's cervical spine along midline without palpable step-off or deformity, no reports of pain  Cardiovascular:      Rate and Rhythm: Normal rate and regular rhythm. Heart sounds: Normal heart sounds. No murmur heard. No friction rub. No gallop. Pulmonary:      Effort: Pulmonary effort is normal. No respiratory distress. Breath sounds: Normal breath sounds. No stridor. No decreased breath sounds, wheezing, rhonchi or rales. Chest:      Chest wall: No tenderness. Abdominal:      General: There is no distension. Palpations: Abdomen is soft. There is no mass. Tenderness:  There is no abdominal tenderness. There is no guarding or rebound. Hernia: No hernia is present. Musculoskeletal:         General: No tenderness or deformity. Normal range of motion. Skin:     General: Skin is warm and dry. Neurological:      Mental Status: He is alert and oriented to person, place, and time. Cranial Nerves: No cranial nerve deficit. MDM  Number of Diagnoses or Management Options  Closed fracture of tooth, initial encounter: new and requires workup  Closed head injury, initial encounter: new and requires workup  Facial laceration, initial encounter: new and requires workup  Lip laceration, initial encounter: new and requires workup  Tooth avulsion, initial encounter: new and requires workup  Diagnosis management comments: Will obtain CT imaging of the head face and neck. Patient will require laceration repair of the right face and lip       Amount and/or Complexity of Data Reviewed  Tests in the radiology section of CPT®: ordered and reviewed    Risk of Complications, Morbidity, and/or Mortality  Presenting problems: moderate  Diagnostic procedures: low  Management options: moderate    Patient Progress  Patient progress: stable         Wound Repair    Date/Time: 2/3/2022 10:00 PM  Performed by: attendingPreparation: skin prepped with Betadine  Pre-procedure re-eval: Immediately prior to the procedure, the patient was reevaluated and found suitable for the planned procedure and any planned medications.   Location details: face and lip  Wound length:2.6 - 7.5 cm  Anesthesia: nerve block    Anesthesia:  Local Anesthetic: lidocaine 1% with epinephrine  Foreign bodies: no foreign bodies  Irrigation solution: saline  Debridement: none  Skin closure: Vicryl and 5-0 nylon  Number of sutures: 4  Technique: simple and complex  Approximation: close  Lip approximation: vermillion border well aligned  Patient tolerance: patient tolerated the procedure well with no immediate complications  Comments: Two separate lacerations repaired, initial one beside the right nare repaired with 1, 5-0 blue Prolene suture and one 5-0 Vicryl suture. This laceration measures approximately 0.5 cm. Second laceration repaired at the center of patient's top lip measures approximately 0.25 cm but does cross the vermilion border. 5-0, blue Prolene suture placed through vermilion border for approximation followed by 5-0 Vicryl suture placed through lip laceration itself.

## 2022-02-09 ENCOUNTER — HOSPITAL ENCOUNTER (EMERGENCY)
Age: 77
Discharge: LEFT BEFORE DISCHARGE | End: 2022-02-09
Payer: MEDICARE

## 2022-02-09 VITALS
TEMPERATURE: 97.7 F | DIASTOLIC BLOOD PRESSURE: 71 MMHG | SYSTOLIC BLOOD PRESSURE: 134 MMHG | HEART RATE: 62 BPM | RESPIRATION RATE: 18 BRPM | OXYGEN SATURATION: 97 %

## 2022-02-09 PROBLEM — S81.012A KNEE LACERATION, LEFT, INITIAL ENCOUNTER: Status: ACTIVE | Noted: 2019-10-13
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
## 2022-02-09 PROBLEM — J02.9 SORE THROAT: Status: ACTIVE | Noted: 2018-07-13

## 2022-02-09 PROBLEM — E78.00 PURE HYPERCHOLESTEROLEMIA: Status: ACTIVE | Noted: 2018-03-26

## 2022-02-09 PROCEDURE — 75810000275 HC EMERGENCY DEPT VISIT NO LEVEL OF CARE

## 2022-02-09 NOTE — ED TRIAGE NOTES
Pt ambulatory unassisted to triage with mask in place. Pt presents for suture removal. States sutures were placed x7 days ago to his forehead.

## 2022-03-18 PROBLEM — I65.22 LEFT CAROTID ARTERY STENOSIS: Status: ACTIVE | Noted: 2021-09-03

## 2022-03-18 PROBLEM — S81.012A KNEE LACERATION, LEFT, INITIAL ENCOUNTER: Status: ACTIVE | Noted: 2019-10-13

## 2022-03-18 PROBLEM — E78.00 PURE HYPERCHOLESTEROLEMIA: Status: ACTIVE | Noted: 2018-03-26

## 2022-03-18 PROBLEM — J02.9 SORE THROAT: Status: ACTIVE | Noted: 2018-07-13

## 2022-03-19 PROBLEM — I48.0 PAF (PAROXYSMAL ATRIAL FIBRILLATION) (HCC): Status: ACTIVE | Noted: 2018-11-19

## 2022-03-19 PROBLEM — I63.9 CVA (CEREBRAL VASCULAR ACCIDENT) (HCC): Status: ACTIVE | Noted: 2021-09-02
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
## 2022-03-20 PROBLEM — I65.22 CAROTID STENOSIS, LEFT: Status: ACTIVE | Noted: 2021-10-11

## 2022-07-26 NOTE — ED TRIAGE NOTES
Improve while on Effexor 37 5 mg  No sexual side effects with this medication  This is the 1st medication has worked for patient's anxiety without sexual side effects  Continue at this time  Pt arrives to ED by EMS. Pt reports walking dog and tripping up on the dogs leash and falling to the ground hitting head and face. Pt has a hematoma over right eyebrow and small laceration over lip. Pt also reports having chipped tooth over right side of mouth.  Pt denies any LOC, CP, N/V or SOB

## 2022-08-24 ENCOUNTER — OFFICE VISIT (OUTPATIENT)
Dept: CARDIOLOGY CLINIC | Age: 77
End: 2022-08-24
Payer: MEDICARE

## 2022-08-24 VITALS
HEIGHT: 68 IN | DIASTOLIC BLOOD PRESSURE: 62 MMHG | SYSTOLIC BLOOD PRESSURE: 108 MMHG | WEIGHT: 175 LBS | BODY MASS INDEX: 26.52 KG/M2 | HEART RATE: 59 BPM

## 2022-08-24 DIAGNOSIS — E78.5 DYSLIPIDEMIA: ICD-10-CM

## 2022-08-24 DIAGNOSIS — I48.0 PAROXYSMAL ATRIAL FIBRILLATION (HCC): Primary | ICD-10-CM

## 2022-08-24 DIAGNOSIS — I25.10 CORONARY ARTERY DISEASE INVOLVING NATIVE CORONARY ARTERY OF NATIVE HEART WITHOUT ANGINA PECTORIS: ICD-10-CM

## 2022-08-24 DIAGNOSIS — I48.0 PAF (PAROXYSMAL ATRIAL FIBRILLATION) (HCC): ICD-10-CM

## 2022-08-24 DIAGNOSIS — E78.00 PURE HYPERCHOLESTEROLEMIA: ICD-10-CM

## 2022-08-24 DIAGNOSIS — I10 PRIMARY HYPERTENSION: ICD-10-CM

## 2022-08-24 PROCEDURE — 1123F ACP DISCUSS/DSCN MKR DOCD: CPT | Performed by: INTERNAL MEDICINE

## 2022-08-24 PROCEDURE — 93000 ELECTROCARDIOGRAM COMPLETE: CPT | Performed by: INTERNAL MEDICINE

## 2022-08-24 PROCEDURE — G8427 DOCREV CUR MEDS BY ELIG CLIN: HCPCS | Performed by: INTERNAL MEDICINE

## 2022-08-24 PROCEDURE — 4004F PT TOBACCO SCREEN RCVD TLK: CPT | Performed by: INTERNAL MEDICINE

## 2022-08-24 PROCEDURE — 99214 OFFICE O/P EST MOD 30 MIN: CPT | Performed by: INTERNAL MEDICINE

## 2022-08-24 PROCEDURE — G8419 CALC BMI OUT NRM PARAM NOF/U: HCPCS | Performed by: INTERNAL MEDICINE

## 2022-08-24 RX ORDER — AMLODIPINE BESYLATE 5 MG/1
5 TABLET ORAL DAILY
COMMUNITY

## 2022-08-24 NOTE — PROGRESS NOTES
[facility] CARDIOLOGY  [address] [facility], [address]  PHONE: [phone]      22    NAME:  Sherman Paula  : 1945  MRN: [id number]       SUBJECTIVE:   Sherman Paula is a 69 y.o. male seen for a follow up visit regarding the following:     Chief Complaint   Patient presents with    Coronary Artery Disease     3 month f/u          HPI:    No cp or goss. No orthopnea or pnd. No palpitations or syncope. Past Medical History, Past Surgical History, Family history, Social History, and Medications were all reviewed with the patient today and updated as necessary. Current Outpatient Medications   Medication Sig Dispense Refill    amLODIPine (NORVASC) 5 MG tablet Take 5 mg by mouth daily      acetaminophen (TYLENOL) 500 MG tablet Take 1,000 mg by mouth every 6 hours as needed      apixaban (ELIQUIS) 5 MG TABS tablet Take 5 mg by mouth 2 times daily      calcium citrate-vitamin D (CITRICAL + D) 315-250 MG-UNIT TABS per tablet One am(with centrum) and two pm Indications: OSTEOPOROSIS      clopidogrel (PLAVIX) 75 MG tablet Take 75 mg by mouth daily      flecainide (TAMBOCOR) 100 MG tablet Take 100 mg by mouth 2 times daily      fluticasone (FLONASE) 50 MCG/ACT nasal spray 1 spray by Nasal route daily      latanoprost (XALATAN) 0.005 % ophthalmic solution USE 1 DROP IN BOTH EYES EVERY EVENING      losartan (COZAAR) 100 MG tablet Take 100 mg by mouth 2 times daily      magnesium oxide (MAG-OX) 400 MG tablet Take 400 mg by mouth 2 times daily      metFORMIN (GLUCOPHAGE) 500 MG tablet Take 500 mg by mouth daily (with breakfast)      rosuvastatin (CRESTOR) 40 MG tablet Take 40 mg by mouth      timolol (TIMOPTIC) 0.25 % ophthalmic solution Apply 1 drop to eye daily       No current facility-administered medications for this visit.                Social History     Tobacco Use    Smoking status: Former     Types: Cigarettes     Quit date: 3/10/1978     Years since quitting: 44.4    Smokeless tobacco: Never    Tobacco comments:     Quit smoking: quit 1978 new year's jesús   Substance Use Topics    Alcohol use: Yes     Alcohol/week: 7.0 standard drinks              PHYSICAL EXAM:   /62   Pulse 59 Comment: per EKG  Ht 5' 8\" (1.727 m)   Wt 175 lb (79.4 kg)   BMI 26.61 kg/m²    Constitutional: Oriented to person, place, and time. Appears well-developed and well-nourished. Head: Normocephalic and atraumatic. Neck: Neck supple. Cardiovascular: Normal rate and regular rhythm with no murmur -No JVP  Pulmonary/Chest: Breath sounds normal.   Abdominal: Soft. Musculoskeletal: No edema. Neurological: Alert and oriented to person, place, and time. Skin: Skin is warm and dry. Psychiatric: Normal mood and affect. Vitals reviewed. Wt Readings from Last 3 Encounters:   08/24/22 175 lb (79.4 kg)   05/18/22 182 lb (82.6 kg)   04/26/22 188 lb (85.3 kg)   Ekg-Sinus  Bradycardia  -First degree A-V block   hr 59  no st changes    Medical problems and test results were reviewed with the patient today. ASSESSMENT and PLAN    1. Paroxysmal atrial fibrillation (HCC)  Stable. Continue eliquis    - EKG 12 lead    2. Pure hypercholesterolemia  Stable. Continue crestor      3. PAF (paroxysmal atrial fibrillation) (HCC)  Stable. Continue eliquis      4. Primary hypertension  Stable. Continue norvasc and cozaar      5. Dyslipidemia  Stable. Continue crestor      6. Coronary artery disease involving native coronary artery of native heart without angina pectoris  Stable. Continue plaix    7. Pre-op knee  Moderate cv risk  Stay on plavix - hold         Return in about 6 months (around 2/24/2023).          Marcella Hyde MD  8/24/2022  8:15 AM

## 2022-09-21 ENCOUNTER — OFFICE VISIT (OUTPATIENT)
Dept: NEUROLOGY | Age: 77
End: 2022-09-21
Payer: MEDICARE

## 2022-09-21 VITALS
OXYGEN SATURATION: 98 % | HEART RATE: 54 BPM | SYSTOLIC BLOOD PRESSURE: 145 MMHG | HEIGHT: 68 IN | DIASTOLIC BLOOD PRESSURE: 72 MMHG | WEIGHT: 174 LBS | BODY MASS INDEX: 26.37 KG/M2

## 2022-09-21 DIAGNOSIS — Z87.891 FORMER TOBACCO USE: ICD-10-CM

## 2022-09-21 DIAGNOSIS — Z98.890 HISTORY OF LEFT-SIDED CAROTID ENDARTERECTOMY: ICD-10-CM

## 2022-09-21 DIAGNOSIS — I65.23 BILATERAL CAROTID ARTERY STENOSIS: ICD-10-CM

## 2022-09-21 DIAGNOSIS — Z86.73 HISTORY OF STROKE: Primary | ICD-10-CM

## 2022-09-21 DIAGNOSIS — I48.0 PAROXYSMAL ATRIAL FIBRILLATION (HCC): ICD-10-CM

## 2022-09-21 DIAGNOSIS — G47.33 OSA ON CPAP: ICD-10-CM

## 2022-09-21 DIAGNOSIS — G89.29 CHRONIC PAIN OF LEFT KNEE: ICD-10-CM

## 2022-09-21 DIAGNOSIS — E11.65 TYPE 2 DIABETES MELLITUS WITH HYPERGLYCEMIA, WITHOUT LONG-TERM CURRENT USE OF INSULIN (HCC): ICD-10-CM

## 2022-09-21 DIAGNOSIS — Z99.89 OSA ON CPAP: ICD-10-CM

## 2022-09-21 DIAGNOSIS — M25.562 CHRONIC PAIN OF LEFT KNEE: ICD-10-CM

## 2022-09-21 DIAGNOSIS — I10 HTN, GOAL BELOW 130/80: ICD-10-CM

## 2022-09-21 PROBLEM — M17.0 ARTHRITIS OF BOTH KNEES: Status: ACTIVE | Noted: 2022-08-09

## 2022-09-21 PROCEDURE — G8427 DOCREV CUR MEDS BY ELIG CLIN: HCPCS | Performed by: NURSE PRACTITIONER

## 2022-09-21 PROCEDURE — G8417 CALC BMI ABV UP PARAM F/U: HCPCS | Performed by: NURSE PRACTITIONER

## 2022-09-21 PROCEDURE — 1036F TOBACCO NON-USER: CPT | Performed by: NURSE PRACTITIONER

## 2022-09-21 PROCEDURE — 99215 OFFICE O/P EST HI 40 MIN: CPT | Performed by: NURSE PRACTITIONER

## 2022-09-21 PROCEDURE — 1123F ACP DISCUSS/DSCN MKR DOCD: CPT | Performed by: NURSE PRACTITIONER

## 2022-09-21 ASSESSMENT — PATIENT HEALTH QUESTIONNAIRE - PHQ9
SUM OF ALL RESPONSES TO PHQ QUESTIONS 1-9: 0
SUM OF ALL RESPONSES TO PHQ QUESTIONS 1-9: 0
SUM OF ALL RESPONSES TO PHQ9 QUESTIONS 1 & 2: 0
SUM OF ALL RESPONSES TO PHQ QUESTIONS 1-9: 0
SUM OF ALL RESPONSES TO PHQ QUESTIONS 1-9: 0
1. LITTLE INTEREST OR PLEASURE IN DOING THINGS: 0
2. FEELING DOWN, DEPRESSED OR HOPELESS: 0

## 2022-09-21 NOTE — PROGRESS NOTES
[facility] Neurology [facility]  [address] [facility]  [address] [facility], [address] [facility]      Chief Complaint   Patient presents with    Follow-up    Cerebrovascular Accident           Sariah Lopez is a 77 y.o. male who presents today for follow up for ischemic stroke  in Sept. 2021. PMH significant for A.fib (Eliquis), CAD, DM type II, HTN, dyslipidemia, YEYO, carotid stenosis who presented to ED with facial droop and dysarthria. He was evaluated by teleneurology. NIH 0.  CT head negative for acute intracranial abnormalities; chronic small vessel disease. CTA head/neck demonstrated Short segment near occlusive stenosis of the proximal LICA. Carotid US showed moderate stenosis of LICA (56-50%), mild stenosis of TOMI. He was not a candidate for tPA or  DIANE. MRI brain showed subacute ischemic changes involving the right putamen; chronic lacunae throughout the corona radiata and centrum semiovale. He was evaluated by vascular surgery and they recommended outpatient follow-up. Plavix is added to Eliquis given his recent stroke and carotid artery disease. TTE EF 55-60% negative for PFO or LAD. NSR with 1 AVB on telemetry. He was evaluted by PT/OT/ST and physiatry with no additional recommendations. He was d/c home on Plavix 75 mg po daily, Eliquis 5 mg po BID, and rosuvastatin 40 mg po daily for secondary stroke prevention. Interval history:    He is here today by himself. Endorses chronic left knee pain s/p fall. Wearing knee brace. He is currently followed by Orthopedics with plans for surgical repair for meniscus tear and ACL tear per patient. He is currently ambulating with cane. He is independent of his ADLs. He has been compliant with his medications. He is currently taking Plavix 75 mg po daily, Eliquis 5 mg po BID, and rosuvastatin 40 mg po daily for secondary stroke prevention. Denies GI distress, myalgia, or bleeding complications. He has been compliant with CPAP.      He continues to exercise twice weekly at [employer] at [facility] and walks daily with his dog. Denies feelings of depression or sadness or SI. Past Medical History:   Diagnosis Date    Acute maxillary sinusitis     Acute pharyngitis     Aftercare for long-term (current) use of antiplatelets/antithrombotics 9/29/2016    Allergic rhinitis     Anemia 9/29/2016    Backache     CAD (coronary artery disease) 9/29/2016    Carotid artery disease ([facility] 9/29/2016    Chronic atrial fibrillation (HCC)     Decreased libido     Diabetes ([facility] 9/29/2016    type 2, does not check blood sugar at home    Dizziness and giddiness     ED (erectile dysfunction) 9/29/2016    Encounter for monitoring testosterone replacement therapy 9/29/2016    Fatigue     Fever blister     GERD (gastroesophageal reflux disease)     controlled with nexium    Glaucoma 9/29/2016    Hearing difficulty of right ear 9/29/2016    High cholesterol     High risk medication use 9/29/2016    Hypertension 9/29/2016    managed with meds    Hyponatremia 9/29/2016    IBS (irritable bowel syndrome) 9/29/2016    Intertrigo 9/29/2016    Irregular heart beat 09/29/2016    Left shoulder pain     Mouth pain     Obstructive sleep apnea of adult 9/29/2016    Osteoporosis 9/29/2016    Plantar wart of left foot 9/29/2016    Rotator cuff tear     Suspected Diagnosis    Sinus bradycardia 9/29/2016    Sinusitis     Skin infection     Sore throat     Special screening for malignant neoplasm of prostate     Stroke ([facility] 09/01/2021    slurred speech and left sided facial droop, resolved    Subclinical hyperthyroidism     Tinea corporis        Past Surgical History:   Procedure Laterality Date    HX COLONOSCOPY      HX UROLOGICAL      age 17,--urethra narrowing. Cheryl Adler Cheryl Adler Had bladder infection/?incomplete emptying    VASCULAR SURGERY PROCEDURE UNLIST  10/11/2021    Left carotid endarterectomy, bovine pericardial patch closure.        Family History   Problem Relation Age of Onset    Cancer Father hodgkin's lymphoma    Sudden Death Father         age,68, presumed MI, heavey smoker       Social History     Socioeconomic History    Marital status:    Tobacco Use    Smoking status: Former Smoker     Quit date: 3/10/1978     Years since quittin.9    Smokeless tobacco: Never Used    Tobacco comment: quit 1978 new year's jesús   Vaping Use    Vaping Use: Never used   Substance and Sexual Activity    Alcohol use: Yes     Alcohol/week: 7.0 standard drinks     Types: 7 Glasses of wine per week     Comment: daily beer    Drug use: No    Sexual activity: Yes     Partners: Female         Current Outpatient Medications:     losartan (COZAAR) 100 mg tablet, Take 100 mg by mouth two (2) times a day., Disp: , Rfl:     metoprolol succinate (TOPROL-XL) 25 mg XL tablet, Take 1 Tablet by mouth daily. , Disp: 90 Tablet, Rfl: 3    flecainide (TAMBOCOR) 100 mg tablet, Take 1 Tablet by mouth two (2) times a day., Disp: 180 Tablet, Rfl: 3    apixaban (ELIQUIS) 5 mg tablet, Take 1 Tablet by mouth two (2) times a day., Disp: 180 Tablet, Rfl: 3    acetaminophen (Tylenol Extra Strength) 500 mg tablet, Take 1,000 mg by mouth every six (6) hours as needed for Pain., Disp: , Rfl:     fluticasone propionate (FLONASE) 50 mcg/actuation nasal spray, 1 [place] by Nasal route daily. , Disp: , Rfl:     ferrous sulfate (IRON PO), Take  by mouth daily (with lunch). , Disp: , Rfl:     Blood-Glucose Meter monitoring kit, Use as instructed., Disp: 1 Kit, Rfl: 0    metFORMIN (GLUCOPHAGE) 500 mg tablet, Take 1 Tablet by mouth daily (with breakfast). , Disp: 30 Tablet, Rfl: 0    glucose blood VI test strips (ASCENSIA AUTODISC VI, ONE TOUCH ULTRA TEST VI) strip, Use once a day as instructed., Disp: 100 Strip, Rfl: 0    magnesium oxide (MAG-OX) 400 mg tablet, Take 1 Tab by mouth two (2) times a day.  (Patient taking differently: Take 400 mg by mouth nightly.), Disp: 180 Tab, Rfl: 3    Blood Pressure Monitor (BLOOD PRESSURE KIT) kit, Check BP daily., Disp: 1 Kit, Rfl: 0    rosuvastatin (CRESTOR) 40 mg tablet, Take 1 Tab by mouth nightly. (Patient taking differently: Take 40 mg by mouth daily (with lunch). Indications: excessive fat in the blood), Disp: 90 Tab, Rfl: 1    latanoprost (XALATAN) 0.005 % ophthalmic solution, USE 1 DROP IN BOTH EYES EVERY EVENING, Disp: , Rfl: 4    calcium citrate-vitamin D3 (CITRACAL + D) tablet, One am(with centrum) and two pm   Indications: OSTEOPOROSIS (Patient taking differently: daily (with lunch). Indications: osteoporosis, a condition of weak bones), Disp: 90 Tab, Rfl: 3    timolol (TIMOPTIC) 0.25 % ophthalmic solution, Administer 1 Drop to both eyes daily. , Disp: , Rfl:     omega 3-dha-epa-fish oil (FISH OIL) 900-1,400 mg cpDR, Take 2 Caps by mouth daily for 90 days. Last dose 01/14/10   Indications: ARTERIOSCLEROTIC VASCULAR DISEASE PREVENTION, Disp: 180 Cap, Rfl: 3    No Known Allergies    REVIEW OF SYSTEMS:  CONSTITUTIONAL: No weight loss, fever, chills, weakness or fatigue. HEENT: Eyes: No visual loss, blurred vision, double vision or yellow sclerae. Ears, Nose, Throat: [place],  sneezing, congestion, runny nose or sore throat. SKIN: No rash or itching. CARDIOVASCULAR: No chest pain, chest pressure or chest discomfort. No palpitations or edema. RESPIRATORY: No shortness of breath, cough or sputum. GASTROINTESTINAL: No anorexia, nausea, vomiting or diarrhea. No abdominal pain or blood. GENITOURINARY: no burning with urination. NEUROLOGICAL: No headache, dizziness, syncope, paralysis, ataxia, numbness or tingling in the extremities. No change in bowel or bladder control. MUSCULOSKELETAL: chronic left knee pain  No muscle, back pain, joint pain or stiffness. HEMATOLOGIC: No anemia, bleeding or bruising. LYMPHATICS: No enlarged nodes. No history of splenectomy. PSYCHIATRIC: No history of depression or anxiety. ENDOCRINOLOGIC: No reports of sweating, cold or heat intolerance.  No polyuria or polydipsia. ALLERGIES: No history of asthma, hives, eczema or rhinitis. Physical Examination  BP (!) 145/72 (Site: Left Upper Arm, Position: Sitting, Cuff Size: Medium Adult)   Pulse 54   Ht 5' 8\" (1.727 m)   Wt 174 lb (78.9 kg)   SpO2 98%   BMI 26.46 kg/m²       General - Well developed, well nourished, in no apparent distress. Pleasant and conversent. HEENT - Normocephalic, ecchymosis to right orbit . Conjunctiva, tympanic membranes, and oropharynx are clear. Neck - Supple without masses, no bruits   Cardiovascular - irregular rate and rhythm. Normal S1, S2 without murmurs, rubs, or gallops. Lungs - Clear to auscultation. Abdomen - Soft, nontender with normal bowel sounds. Extremities - Peripheral pulses intact. Ecchymosis to right hand, laceration to right side of nose and  Lower lip with sutures. No edema and no rashes. Neurological examination - Comprehension, attention , memory and reasoning are intact. Language and speech are normal. On cranial nerve examination pupils are equal round and reactive to light. Fundoscopic examination is normal. Visual acuity is adequate. Visual fields are full to finger confrontation. Extraocular motility is normal. Face is symmetric and sensation is intact to light touch. Hearing is intact to finger rustle bilaterally. Motor examination - There is normal muscle tone and bulk. Power is full throughout with exception of 4/5 LLE due to pain. Muscle stretch reflexes are 2+ BUE, 1+ right patellar and ankle jerks, unable to assess left patellar due to knee brace. Sensation is intact to light touch, pinprick, vibration and proprioception in all extremities. Cerebellar examination is normal finger/nose and heel/shin.  Gait and stance antalgic on left, ambulates with cane     Lab Results   Component Value Date    CHOL 126 09/02/2021    CHOL 136 10/15/2019     Lab Results   Component Value Date    TRIG 54 09/02/2021    TRIG 82 10/15/2019     Lab Results   Component Value Date    HDL 53 09/02/2021    HDL 52 10/15/2019     Lab Results   Component Value Date    LDLCALC 62.2 09/02/2021    LDLCALC 68 10/15/2019     Lab Results   Component Value Date    LABVLDL 10.8 09/02/2021    LABVLDL 16 10/15/2019     Lab Results   Component Value Date    CHOLHDLRATIO 2.4 09/02/2021     Lab Results   Component Value Date    LABA1C 6.6 (H) 09/02/2021     Lab Results   Component Value Date     09/02/2021       Diagnoses and all orders for this visit:    1. History of stroke  Continue secondary stroke prevention  Goal SBP <130/80  Goal LDL<70  Goal A1C <7.0  Discussed Mediterranean diet and increase cardiovascular  Exercise as tolerated to goal of 30 minutes daily at least 4-5 days per week. Reviewed BE FAST and when to call 911.     2. HTN, goal below 130/80  Stable. 3. Bilateral carotid artery stenosis  Stable. S/p L CEA, asymptomatic TOMI stenosis. Followed by Vascular surgery. Continue Plavix and rosuvastatin. 4. History of left sided carotid endarterectomy    5. PAF (paroxysmal atrial fibrillation) (HCC)  Stable. Continue eliquis. 6. Type 2 diabetes mellitus with hyperglycemia, without long term current use of insulin  Stable. 7. YEYO on CPAP    8. Former tobacco use    9. Chronic left knee pain  Followed by orthopedics. If surgery is option, he will need to  hold eliquis and clopidogrel , recommend resume ASA 81 mg daily while holding medications. Follow up as needed    I spent greater than 50% of the 45 total minutes of today's visit in coordination of care and patient/family education and counseling regarding the above patient concerns, reviewing the patient's medical record, my assessment and recommendations.         Jerome White, APRN

## 2022-10-25 ENCOUNTER — OFFICE VISIT (OUTPATIENT)
Dept: VASCULAR SURGERY | Age: 77
End: 2022-10-25
Payer: MEDICARE

## 2022-10-25 VITALS
WEIGHT: 173 LBS | TEMPERATURE: 97.4 F | HEART RATE: 60 BPM | OXYGEN SATURATION: 100 % | HEIGHT: 68 IN | SYSTOLIC BLOOD PRESSURE: 135 MMHG | DIASTOLIC BLOOD PRESSURE: 75 MMHG | BODY MASS INDEX: 26.22 KG/M2

## 2022-10-25 DIAGNOSIS — I65.21 STENOSIS OF RIGHT CAROTID ARTERY: Primary | ICD-10-CM

## 2022-10-25 DIAGNOSIS — Z98.890 H/O CAROTID ENDARTERECTOMY: ICD-10-CM

## 2022-10-25 PROCEDURE — 99213 OFFICE O/P EST LOW 20 MIN: CPT | Performed by: NURSE PRACTITIONER

## 2022-10-25 PROCEDURE — G8427 DOCREV CUR MEDS BY ELIG CLIN: HCPCS | Performed by: NURSE PRACTITIONER

## 2022-10-25 PROCEDURE — 1123F ACP DISCUSS/DSCN MKR DOCD: CPT | Performed by: NURSE PRACTITIONER

## 2022-10-25 PROCEDURE — G8484 FLU IMMUNIZE NO ADMIN: HCPCS | Performed by: NURSE PRACTITIONER

## 2022-10-25 PROCEDURE — 1036F TOBACCO NON-USER: CPT | Performed by: NURSE PRACTITIONER

## 2022-10-25 PROCEDURE — G8417 CALC BMI ABV UP PARAM F/U: HCPCS | Performed by: NURSE PRACTITIONER

## 2022-10-25 ASSESSMENT — PATIENT HEALTH QUESTIONNAIRE - PHQ9
SUM OF ALL RESPONSES TO PHQ QUESTIONS 1-9: 0
1. LITTLE INTEREST OR PLEASURE IN DOING THINGS: 0
SUM OF ALL RESPONSES TO PHQ QUESTIONS 1-9: 0
2. FEELING DOWN, DEPRESSED OR HOPELESS: 0
SUM OF ALL RESPONSES TO PHQ QUESTIONS 1-9: 0
SUM OF ALL RESPONSES TO PHQ QUESTIONS 1-9: 0
SUM OF ALL RESPONSES TO PHQ9 QUESTIONS 1 & 2: 0

## 2022-10-25 NOTE — PROGRESS NOTES
DATE OF VISIT: 10/25/2022      HPI  Kassandra Castellanos is a 69 y.o. male who follows up for his 6-month carotid duplex study. He denies any new lateralizing neurological symptoms. He remains on Eliquis, Plavix, Crestor and Tambocor. He has undergone a left carotid endarterectomy. MEDICAL HISTORY:   Past Medical History:   Diagnosis Date    Acute maxillary sinusitis     Acute pharyngitis     Aftercare for long-term (current) use of antiplatelets/antithrombotics 9/29/2016    Allergic rhinitis     Anemia 9/29/2016    Backache     CAD (coronary artery disease) 9/29/2016    Carotid artery disease ([facility] 9/29/2016    Chronic atrial fibrillation (HCC)     Decreased libido     Diabetes ([facility] 9/29/2016    type 2, does not check blood sugar at home    Dizziness and giddiness     ED (erectile dysfunction) 9/29/2016    Encounter for monitoring testosterone replacement therapy 9/29/2016    Fatigue     Fever blister     GERD (gastroesophageal reflux disease)     controlled with nexium    Glaucoma 9/29/2016    Hearing difficulty of right ear 9/29/2016    High cholesterol     High risk medication use 9/29/2016    Hypertension 9/29/2016    managed with meds    Hyponatremia 9/29/2016    IBS (irritable bowel syndrome) 9/29/2016    Intertrigo 9/29/2016    Irregular heart beat 09/29/2016    Left shoulder pain     Mouth pain     Obstructive sleep apnea of adult 9/29/2016    Osteoporosis 9/29/2016    Plantar wart of left foot 9/29/2016    Rotator cuff tear     Suspected Diagnosis    Sinus bradycardia 9/29/2016    Sinusitis     Skin infection     Sore throat     Special screening for malignant neoplasm of prostate     Stroke ([facility] [address] 09/01/2021    slurred speech and left sided facial droop, resolved    Subclinical hyperthyroidism     Tinea corporis          SURGICAL HISTORY:   Past Surgical History:   Procedure Laterality Date    COLONOSCOPY      UROLOGICAL SURGERY      age 17,--urethra narrowing. Les Pimple Les Pimple Had bladder infection/?incomplete emptying    VASCULAR SURGERY  10/11/2021    Left carotid endarterectomy, bovine pericardial patch closure. Review of Systems:  Constitutional:   Negative for fevers and unexplained weight loss. Respiratory:   Negative for hemoptysis. Cardiovascular:   Negative except as noted in HPI. Musculoskeletal:  Negative for active, unexplained/severe joint pain. ALLERGIES: No Known Allergies    CURRENT MEDICATIONS:  Current Outpatient Medications   Medication Sig Dispense Refill    amLODIPine (NORVASC) 5 MG tablet Take 5 mg by mouth daily      apixaban (ELIQUIS) 5 MG TABS tablet Take 1 tablet by mouth 2 times daily 180 tablet 3    acetaminophen (TYLENOL) 500 MG tablet Take 1,000 mg by mouth every 6 hours as needed      calcium citrate-vitamin D (CITRICAL + D) 315-250 MG-UNIT TABS per tablet One am(with centrum) and two pm Indications: OSTEOPOROSIS      clopidogrel (PLAVIX) 75 MG tablet Take 75 mg by mouth daily      flecainide (TAMBOCOR) 100 MG tablet Take 100 mg by mouth 2 times daily      latanoprost (XALATAN) 0.005 % ophthalmic solution USE 1 DROP IN BOTH EYES EVERY EVENING      losartan (COZAAR) 100 MG tablet Take 100 mg by mouth 2 times daily      magnesium oxide (MAG-OX) 400 MG tablet Take 400 mg by mouth 2 times daily      metFORMIN (GLUCOPHAGE) 500 MG tablet Take 500 mg by mouth daily (with breakfast)      rosuvastatin (CRESTOR) 40 MG tablet Take 40 mg by mouth      timolol (TIMOPTIC) 0.25 % ophthalmic solution Apply 1 drop to eye daily      fluticasone (FLONASE) 50 MCG/ACT nasal spray 1 spray by Nasal route daily       No current facility-administered medications for this visit. IMAGING: Interpretation Summary         Right carotid: Peak systolic velocities and atherosclerotic plaque indicate a <50% stenosis in the ICA. Left carotid: Patch observed in the left CCA/ICA system. Patient is post CEA.  B-mode, color and pulsed Doppler imaging indicate a normal extracranial cerebrovascular study. Vertebral Artery: Flow is antegrade. Vertebral Artery: Flow is antegrade. Doppler signal is resistant suggestive of distal occlusive disease. Procedure Details    A gray scale, color Doppler imaging and spectral Doppler analysis ultrasound was performed. During the study longitudinal and transverse views were obtained. Pulsed wave doppler was performed. Overall the study quality was good. Cerebrovascular Findings      Right Carotid    Right arm BP: 139 mmHg. Internal Carotid Artery: Mild (<50%) stenosis. Calcific plaque. External Carotid Artery: Patent. Vertebral Artery: Flow is antegrade. Left Carotid    Patient is s/p carotid endarterectomy. Left arm BP: 135 mmHg. Internal Carotid Artery: Normal.    External Carotid Artery: Patent. Vertebral Artery: Flow is antegrade. Doppler signal is resistant suggestive of distal occlusive disease.    LCEA 10/11/2021       Carotid Measurements     Right PSV Right EDV Left PSV Left EDV   CCA Prox 52.3 cm/s        13.6 cm/s        91.1 cm/s        19 cm/s          CCA Dist 61.8 cm/s        12.9 cm/s        91.7 cm/s        18.3 cm/s          ICA Prox 54.4 cm/s        11.6 cm/s        64.6 cm/s        8.8 cm/s          ICA Mid 59.8 cm/s        12.2 cm/s        51 cm/s        10.2 cm/s          ICA Dist 82.2 cm/s        17.7 cm/s        83.4 cm/s        17.4 cm/s          ICA/CCA Ratio 1.33         0.91           ECA 97.2 cm/s        11.6 cm/s        123 cm/s        14.3 cm/s          Vertebral 71.3 cm/s        14.3 cm/s        27.6 cm/s        0 cm/s            Arterial Pressure Measurements     Right Left   Brachial  mmHg        135 mmHg                                    Procedure Staff    Technologist/Clinician: Jonatan Steinberg  Supporting Staff: None  Performing Physician/Midlevel: None     Exam Completion Date/Time: 10/25/22  8:58 AM      PHYSICAL EXAM  VITALS: /75 (Site: Right Upper Arm)   Pulse 60   Temp 97.4 °F (36.3 °C) (Temporal)   Ht 5' 8\" (1.727 m)   Wt 173 lb (78.5 kg)   SpO2 100%   BMI 26.30 kg/m²       GENERAL: Well developed, well nourished, in NAD  HEAD/NECK: normocephalic, atraumatic, neck supple  MUSCULOSKELETAL: cane  NEURO: sensation and strength grossly intact and symmetrical  PSYCH: alert and oriented to person, place and time      Assessment/Plan: Patient is a 16-year-old male who follows up for his 6-month carotid duplex study. He remains on Eliquis, Plavix, Crestor and Tambocor. Patient is post left CEA with asymptomatic right carotid stenosis. He is to present to the emergency department with any S/S of a stroke i.e. slurred speech, facial drooping, amaurosis fugax or unilateral weakness.         Maddie Coffman, APRN - CNP    20 minutes of time was spent on this encounter including chart review, assessment and evaluation

## 2022-11-23 ENCOUNTER — TELEPHONE (OUTPATIENT)
Dept: CARDIOLOGY CLINIC | Age: 77
End: 2022-11-23

## 2022-11-28 ENCOUNTER — TELEPHONE (OUTPATIENT)
Dept: CARDIOLOGY CLINIC | Age: 77
End: 2022-11-28

## 2022-11-28 NOTE — TELEPHONE ENCOUNTER
Informed patient we are currently out of Eliquis samples. Patient states he has active Rx at pharmacy.

## 2022-11-29 ENCOUNTER — TELEPHONE (OUTPATIENT)
Dept: CARDIOLOGY CLINIC | Age: 77
End: 2022-11-29

## 2022-11-29 NOTE — TELEPHONE ENCOUNTER
Cardiac Clearance        Physician or Practice Requesting:[facility]  : ?  Contact Phone Number: [phone]  Fax Number: [phone]  Date of Surgery/Procedure: 01/27/1923  Type of Surgery or Procedure: ? Arternis  Prostate Biopsy  Type of Anesthesia: ?   Type of Clearance Requested: Cardiac and medication  Medication to Hold:Eliquis  Days to Hold: 3 days

## 2022-11-29 NOTE — TELEPHONE ENCOUNTER
Cardiac Clearance        Physician or Practice Requesting: Dr Gupta Sers: same  Contact Phone Number: [phone]  Fax Number: [phone]  Date of Surgery/Procedure: 1/27/23  Type of Surgery or Procedure: Shu Prostate Biopsy  Type of Anesthesia: General  Type of Clearance Requested: Medication Hold Only  Medication to Hold: Plavix  Days to Hold: 7 days prior

## 2022-11-30 NOTE — TELEPHONE ENCOUNTER
Per Dr. Jennifer Salomon:   Moderate cv risk,Stay on plavix - its the equivalent of his asa- hold eliquis 3 days.

## 2022-12-06 ENCOUNTER — TELEPHONE (OUTPATIENT)
Dept: CARDIOLOGY CLINIC | Age: 77
End: 2022-12-06

## 2022-12-06 NOTE — TELEPHONE ENCOUNTER
Discussed the following: Moderate cv risk,Stay on plavix - its the equivalent of his asa- hold eliquis 3 days. Patient voiced understanding.

## 2023-01-03 ENCOUNTER — TELEPHONE (OUTPATIENT)
Dept: CARDIOLOGY CLINIC | Age: 78
End: 2023-01-03

## 2023-01-03 ENCOUNTER — APPOINTMENT (OUTPATIENT)
Dept: CT IMAGING | Age: 78
End: 2023-01-03
Payer: MEDICARE

## 2023-01-03 ENCOUNTER — HOSPITAL ENCOUNTER (EMERGENCY)
Age: 78
Discharge: HOME | End: 2023-01-03
Attending: EMERGENCY MEDICINE
Payer: MEDICARE

## 2023-01-03 VITALS
HEART RATE: 79 BPM | DIASTOLIC BLOOD PRESSURE: 82 MMHG | HEIGHT: 68 IN | RESPIRATION RATE: 22 BRPM | SYSTOLIC BLOOD PRESSURE: 146 MMHG | WEIGHT: 165 LBS | OXYGEN SATURATION: 94 % | BODY MASS INDEX: 25.01 KG/M2 | TEMPERATURE: 97.9 F

## 2023-01-03 DIAGNOSIS — K80.20 GALLSTONES: ICD-10-CM

## 2023-01-03 DIAGNOSIS — R42 LIGHTHEADEDNESS: ICD-10-CM

## 2023-01-03 DIAGNOSIS — M54.9 UPPER BACK PAIN: Primary | ICD-10-CM

## 2023-01-03 DIAGNOSIS — I48.0 PAROXYSMAL ATRIAL FIBRILLATION (HCC): ICD-10-CM

## 2023-01-03 LAB
ALBUMIN SERPL-MCNC: 3.7 G/DL (ref 3.2–4.6)
ALBUMIN/GLOB SERPL: 1.1 {RATIO} (ref 0.4–1.6)
ALP SERPL-CCNC: 97 U/L (ref 50–136)
ALT SERPL-CCNC: 18 U/L (ref 12–65)
ANION GAP SERPL CALC-SCNC: 5 MMOL/L (ref 2–11)
AST SERPL-CCNC: 16 U/L (ref 15–37)
BILIRUB SERPL-MCNC: 0.5 MG/DL (ref 0.2–1.1)
BUN SERPL-MCNC: 14 MG/DL (ref 8–23)
CALCIUM SERPL-MCNC: 9.7 MG/DL (ref 8.3–10.4)
CHLORIDE SERPL-SCNC: 100 MMOL/L (ref 101–110)
CO2 SERPL-SCNC: 26 MMOL/L (ref 21–32)
CREAT SERPL-MCNC: 0.9 MG/DL (ref 0.8–1.5)
ERYTHROCYTE [DISTWIDTH] IN BLOOD BY AUTOMATED COUNT: 12.5 % (ref 11.9–14.6)
GLOBULIN SER CALC-MCNC: 3.4 G/DL (ref 2.8–4.5)
GLUCOSE SERPL-MCNC: 160 MG/DL (ref 65–100)
HCT VFR BLD AUTO: 37.4 % (ref 41.1–50.3)
HGB BLD-MCNC: 12.9 G/DL (ref 13.6–17.2)
MCH RBC QN AUTO: 32.3 PG (ref 26.1–32.9)
MCHC RBC AUTO-ENTMCNC: 34.5 G/DL (ref 31.4–35)
MCV RBC AUTO: 93.7 FL (ref 82–102)
NRBC # BLD: 0 K/UL (ref 0–0.2)
PLATELET # BLD AUTO: 248 K/UL (ref 150–450)
PMV BLD AUTO: 9.2 FL (ref 9.4–12.3)
POTASSIUM SERPL-SCNC: 4.3 MMOL/L (ref 3.5–5.1)
PROT SERPL-MCNC: 7.1 G/DL (ref 6.3–8.2)
RBC # BLD AUTO: 3.99 M/UL (ref 4.23–5.6)
SODIUM SERPL-SCNC: 131 MMOL/L (ref 133–143)
TROPONIN I SERPL HS-MCNC: 6.3 PG/ML (ref 0–14)
TROPONIN I SERPL HS-MCNC: 7.7 PG/ML (ref 0–14)
WBC # BLD AUTO: 6.1 K/UL (ref 4.3–11.1)

## 2023-01-03 PROCEDURE — 80053 COMPREHEN METABOLIC PANEL: CPT

## 2023-01-03 PROCEDURE — 2580000003 HC RX 258: Performed by: EMERGENCY MEDICINE

## 2023-01-03 PROCEDURE — 99285 EMERGENCY DEPT VISIT HI MDM: CPT

## 2023-01-03 PROCEDURE — 6360000004 HC RX CONTRAST MEDICATION: Performed by: EMERGENCY MEDICINE

## 2023-01-03 PROCEDURE — 74174 CTA ABD&PLVS W/CONTRAST: CPT

## 2023-01-03 PROCEDURE — 85027 COMPLETE CBC AUTOMATED: CPT

## 2023-01-03 PROCEDURE — 94760 N-INVAS EAR/PLS OXIMETRY 1: CPT

## 2023-01-03 PROCEDURE — 84484 ASSAY OF TROPONIN QUANT: CPT

## 2023-01-03 RX ORDER — SODIUM CHLORIDE, SODIUM LACTATE, POTASSIUM CHLORIDE, AND CALCIUM CHLORIDE .6; .31; .03; .02 G/100ML; G/100ML; G/100ML; G/100ML
1000 INJECTION, SOLUTION INTRAVENOUS ONCE
Status: COMPLETED | OUTPATIENT
Start: 2023-01-03 | End: 2023-01-03

## 2023-01-03 RX ORDER — SODIUM CHLORIDE 0.9 % (FLUSH) 0.9 %
10 SYRINGE (ML) INJECTION
Status: COMPLETED | OUTPATIENT
Start: 2023-01-03 | End: 2023-01-03

## 2023-01-03 RX ORDER — 0.9 % SODIUM CHLORIDE 0.9 %
100 INTRAVENOUS SOLUTION INTRAVENOUS
Status: COMPLETED | OUTPATIENT
Start: 2023-01-03 | End: 2023-01-03

## 2023-01-03 RX ADMIN — SODIUM CHLORIDE 100 ML: 9 INJECTION, SOLUTION INTRAVENOUS at 08:31

## 2023-01-03 RX ADMIN — SODIUM CHLORIDE, PRESERVATIVE FREE 10 ML: 5 INJECTION INTRAVENOUS at 08:31

## 2023-01-03 RX ADMIN — SODIUM CHLORIDE, POTASSIUM CHLORIDE, SODIUM LACTATE AND CALCIUM CHLORIDE 1000 ML: 600; 310; 30; 20 INJECTION, SOLUTION INTRAVENOUS at 09:06

## 2023-01-03 RX ADMIN — IOPAMIDOL 100 ML: 755 INJECTION, SOLUTION INTRAVENOUS at 08:30

## 2023-01-03 ASSESSMENT — PAIN - FUNCTIONAL ASSESSMENT: PAIN_FUNCTIONAL_ASSESSMENT: 0-10

## 2023-01-03 ASSESSMENT — ENCOUNTER SYMPTOMS: BACK PAIN: 1

## 2023-01-03 ASSESSMENT — PAIN DESCRIPTION - LOCATION: LOCATION: BACK

## 2023-01-03 ASSESSMENT — PAIN SCALES - GENERAL: PAINLEVEL_OUTOF10: 3

## 2023-01-03 NOTE — TELEPHONE ENCOUNTER
Triage noted pt seen in ER and f/u appt with Dr. America Moser was recommended. Triage left message informing pt of scheduled f/u appt 1/18/23 at 4:45 with Dr. America Moser at the [employer] office advising him to call our office back with further questions/concerns.

## 2023-01-03 NOTE — DISCHARGE INSTRUCTIONS
There is no emergency seen here on your imaging or blood work we did see some incidental gallstones which could be somewhat related to your upper back pain. There is no evidence of any heart attack or dissection. Please follow-up with your cardiologist.  Please return for any worsening symptoms, recurrent passing out or any other concerning symptoms.

## 2023-01-03 NOTE — ED TRIAGE NOTES
Pt arrives via [facility]. EMS called out for possible stroke. States got up this AM and just \"felt bad\" wife sat pt down in chair and states speech was slurred. Denies fall or LOC. Hypotensive upon EMS arrival to house along with bradycardia in the 40s. VS stable since. Pt currently alert and oriented. Pt denies any lightheadedness, HA, numbness/tingling or any vision changes. . Denies any urinary symptoms. Currently alert and oriented x4.

## 2023-01-03 NOTE — TELEPHONE ENCOUNTER
Patient went to the ER due to dizziness and felt like he was going to pass out. His BP was dropping. Just ask him to follow up. Should he be sooner than March appt?

## 2023-01-03 NOTE — ED NOTES
I have reviewed discharge instructions with the patient. The patient verbalized understanding. Patient left ED via Discharge Method: ambulatory to Home with wife. Opportunity for questions and clarification provided. Patient given 0 scripts. To continue your aftercare when you leave the hospital, you may receive an automated call from our care team to check in on how you are doing. This is a free service and part of our promise to provide the best care and service to meet your aftercare needs.  If you have questions, or wish to unsubscribe from this service please call [phone]. Thank you for Choosing our [facility] Emergency Department.         Christina García RN  01/03/23 2739

## 2023-01-03 NOTE — ED PROVIDER NOTES
Emergency Department Provider Note                   PCP:                Olu Morales MD               Age: 69 y.o. Sex: male       ICD-10-CM    1. Upper back pain  M54.9       2. Gallstones  K80.20       3. Lightheadedness  R42       4. Paroxysmal atrial fibrillation (HCC)  I48.0           DISPOSITION Decision To Discharge 01/03/2023 10:27:37 AM       Medical Decision Making  60-year-old male presents for lightheadedness and upper back pain. EMS reported the patient had a heart rate in the 40s and was hypotensive on arrival.  By the time I seen the patient here in the emergency department his heart rate was in the 60s with a normal blood pressure he is asymptomatic here. He does have pulses bilaterally. However with him complaining of upper back pain cardiac work appears initiated. CTA of the chest was obtained to rule out dissection or aneurysm. CBC here is fairly unremarkable, CMP is also fairly unremarkable, initial troponin was 6.3. Patient has no neurologic deficits there been some talk about a potential stroke. Patient states that he has chronic slurred speech and that is nothing new he has no motor or sensation deficits. Repeat troponin continue to be within normal limits. CTA showed no evidence of dissection or aneurysm. There was incidentally seen to noted to see gallstones. Patient continues to be symptom-free here. He is a heart rate in the 80s with his paroxysmal A. fib with normal blood pressure. This point patient will stable for discharge. He was given return precautions. Patient stable discharge cardiac examination    Amount and/or Complexity of Data Reviewed  Radiology: ordered. Risk  Prescription drug management. Complexity of Problem:1 acute or chronic illness that poses a threat to life or bodily function. (5)  The patients assessment required an independent historian: I spoke with the paramedic.   The patients assessment required an independent historian: patient  I have conducted an independent ordering and review of Labs. I have conducted an independent ordering and review of EKG. I have conducted an independent ordering and review of CT Scan. I have reviewed records from an external source: provider visit notes from outside specialist.  Considerations: Shared decision making was utilized in the care of this patient. Considerations: The following labs and/or imaging studies were considered but not ordered: Chest X Ray, Ct Head  Social determinant affecting care: none  Evidence based risk calculation performed: HEART score. Disposition patient was discharged home. ED Course as of 01/03/23 1032   Tue Jan 03, 2023   5905 ===================================  ED EKG Interpretation  EKG was interpreted in the absence of a cardiologist.    Rate: 62  EKG Interpretation: Sinus Rhythm, Prolonged NV  ST Segments: Nonspecific ST segments - NO STEMI    Joshua L. Garnell Harada, MD 6:59 AM   [JL]      ED Course User Index  [JL] Shila Mathur MD        Orders Placed This Encounter   Procedures    CTA CHEST ABDOMEN PELVIS W WO CONTRAST    CBC    Comprehensive Metabolic Panel    Troponin    Cardiac Monitor    Pulse Oximetry    EKG 12 Lead    Saline lock IV        Medications   lactated ringers bolus (1,000 mLs IntraVENous New Bag 1/3/23 0906)   0.9 % sodium chloride bolus (100 mLs IntraVENous New Bag 1/3/23 0831)   sodium chloride flush 0.9 % injection 10 mL (10 mLs IntraVENous Given 1/3/23 0831)   iopamidol (ISOVUE-370) 76 % injection 100 mL (100 mLs IntraVENous Given 1/3/23 0830)       New Prescriptions    No medications on file        Boston Watson is a 69 y.o. male who presents to the Emergency Department with chief complaint of    Chief Complaint   Patient presents with    Hypotension      56-year-old male presents to the emergency department via EMS with a chief complaint of lightheadedness.   Patient reports last night he was having some upper back pain after watching a football game where player had CPR. Patient states that he was becoming slightly emotional.  He woke up this morning and was walking and felt as if he was going to potentially pass out. He denied having any chest pain or shortness of breath but did have some associated diaphoresis. He was followed by Dr. Jessie Justin cardiologist who he sees for paroxysmal A. fib. He is on Eliquis for this. He denies any current symptoms         Review of Systems   Musculoskeletal:  Positive for back pain. Neurological:  Positive for light-headedness.      Past Medical History:   Diagnosis Date    Acute maxillary sinusitis     Acute pharyngitis     Aftercare for long-term (current) use of antiplatelets/antithrombotics 9/29/2016    Allergic rhinitis     Anemia 9/29/2016    Backache     CAD (coronary artery disease) 9/29/2016    Carotid artery disease ([address] 9/29/2016    Chronic atrial fibrillation (HCC)     Decreased libido     Diabetes ([address] 9/29/2016    type 2, does not check blood sugar at home    Dizziness and giddiness     ED (erectile dysfunction) 9/29/2016    Encounter for monitoring testosterone replacement therapy 9/29/2016    Fatigue     Fever blister     GERD (gastroesophageal reflux disease)     controlled with nexium    Glaucoma 9/29/2016    Hearing difficulty of right ear 9/29/2016    High cholesterol     High risk medication use 9/29/2016    Hypertension 9/29/2016    managed with meds    Hyponatremia 9/29/2016    IBS (irritable bowel syndrome) 9/29/2016    Intertrigo 9/29/2016    Irregular heart beat 09/29/2016    Left shoulder pain     Mouth pain     Obstructive sleep apnea of adult 9/29/2016    Osteoporosis 9/29/2016    Plantar wart of left foot 9/29/2016    Rotator cuff tear     Suspected Diagnosis    Sinus bradycardia 9/29/2016    Sinusitis     Skin infection     Sore throat     Special screening for malignant neoplasm of prostate     Stroke ([address] 09/01/2021    slurred speech and left sided facial droop, resolved    Subclinical hyperthyroidism     Tinea corporis         Past Surgical History:   Procedure Laterality Date    COLONOSCOPY      UROLOGICAL SURGERY      age 17,--urethra narrowing. Ronaldo Rose Had bladder infection/?incomplete emptying    VASCULAR SURGERY  10/11/2021    Left carotid endarterectomy, bovine pericardial patch closure. Family History   Problem Relation Age of Onset    Cancer Father         hodgkin's lymphoma    Sudden Death Father         age,68, presumed MI, heavey smoker        Social History     Socioeconomic History    Marital status:      Spouse name: None    Number of children: None    Years of education: None    Highest education level: None   Tobacco Use    Smoking status: Former     Packs/day: 1.00     Types: Cigarettes     Quit date: 3/10/1978     Years since quittin.8    Smokeless tobacco: Never    Tobacco comments:     Quit smoking: quit 1978 new year's jesús   Substance and Sexual Activity    Alcohol use: Yes     Alcohol/week: 7.0 standard drinks    Drug use: No        Allergies: Patient has no known allergies.     Previous Medications    ACETAMINOPHEN (TYLENOL) 500 MG TABLET    Take 1,000 mg by mouth every 6 hours as needed    AMLODIPINE (NORVASC) 5 MG TABLET    Take 5 mg by mouth daily    APIXABAN (ELIQUIS) 5 MG TABS TABLET    Take 1 tablet by mouth 2 times daily    CALCIUM CITRATE-VITAMIN D (CITRICAL + D) 315-250 MG-UNIT TABS PER TABLET    One am(with centrum) and two pm Indications: OSTEOPOROSIS    CLOPIDOGREL (PLAVIX) 75 MG TABLET    Take 75 mg by mouth daily    FLECAINIDE (TAMBOCOR) 100 MG TABLET    Take 100 mg by mouth 2 times daily    FLUTICASONE (FLONASE) 50 MCG/ACT NASAL SPRAY    1 spray by Nasal route daily    LATANOPROST (XALATAN) 0.005 % OPHTHALMIC SOLUTION    USE 1 DROP IN BOTH EYES EVERY EVENING    LOSARTAN (COZAAR) 100 MG TABLET    Take 100 mg by mouth 2 times daily    MAGNESIUM OXIDE (MAG-OX) 400 MG TABLET    Take 400 mg by mouth 2 times daily    METFORMIN (GLUCOPHAGE) 500 MG TABLET    Take 500 mg by mouth daily (with breakfast)    ROSUVASTATIN (CRESTOR) 40 MG TABLET    Take 40 mg by mouth    TIMOLOL (TIMOPTIC) 0.25 % OPHTHALMIC SOLUTION    Apply 1 drop to eye daily        Vitals signs and nursing note reviewed. Patient Vitals for the past 4 hrs:   Temp Pulse Resp BP SpO2   01/03/23 0650 98 °F (36.7 °C) 61 20 (!) 148/86 99 %          Physical Exam  Vitals and nursing note reviewed. Cardiovascular:      Rate and Rhythm: Normal rate and regular rhythm. Pulses: Normal pulses. Pulmonary:      Effort: Pulmonary effort is normal.      Breath sounds: Normal breath sounds. Neurological:      General: No focal deficit present. Mental Status: He is alert and oriented to person, place, and time. Mental status is at baseline. Procedures    ED EKG Interpretation  EKG was interpreted in the absence of a cardiologist.    Normal sinus rhythm with a ventricular rate of 62 bpm, pr interval 253, , QTc 439, no ST segment ovation or depression noted no signs of acute ischemia, first-degree AV block    Results for orders placed or performed during the hospital encounter of 01/03/23   CTA CHEST ABDOMEN PELVIS W WO CONTRAST    Narrative    Title:  CTA  of the chest, abdomen and pelvis. Indication:  Chest pain and hypotension. Technique: Axial images of the chest, abdomen and pelvis were obtained after the  administration of 100 ml Isovue 370 intravenous iodinated contrast media. Contrast was used to best identify solid structures. Images also viewed on an  independent workstation including 3D rendering. All CT scans at this facility are performed using dose reduction/dose modulation  techniques, as appropriate the performed exam, including the following:   Automated Exposure Control;  Adjustment of the mA and/or kV according to patient  size (this includes techniques or standardized protocols for targeted exams  where dose is matched to indication/reason for exam); and Use of Iterative  Reconstruction Technique. Comparison: None    Findings:     Neck base: Normal   Lungs: Normal  Mediastinum: Normal.  Cardiac: Coronary calcifications. Pulmonary Arteries:  Normal  Esophagus: Normal  CHEST WALL: Bilateral rib fractures which appear subacute to chronic  Liver:Normal  Biliary:Gallstones  Pancreas:Normal  Spleen:Rim-like calcification of the lateral spleen is chronic  Adrenals:Normal  Kidneys:Normal  Lymph nodes:No pathologically enlarged lymph nodes  Abdominal wall:Veronica  Bowel:Large amount of formed stool in the colon. Mild colonic diverticulosis  without evidence of diverticulitis  Pelvic organs:Normal  Bones:Scoliosis and degenerative changes    Aorta:  No evidence of aneurysm or dissection. Mild atherosclerotic changes of  the thoracic and abdominal aorta. Visualized great vessels are patent. Mesenteric arteries and the renal arteries are patent. Vascular/Other:  Normal        Impression    1. No acute arterial process is identified. No evidence of aortic aneurysm or  dissection. 2. Gallstones. 3. Subacute-appearing bilateral rib fractures.   4. Large stool burden   CBC   Result Value Ref Range    WBC 6.1 4.3 - 11.1 K/uL    RBC 3.99 (L) 4.23 - 5.6 M/uL    Hemoglobin 12.9 (L) 13.6 - 17.2 g/dL    Hematocrit 37.4 (L) 41.1 - 50.3 %    MCV 93.7 82 - 102 FL    MCH 32.3 26.1 - 32.9 PG    MCHC 34.5 31.4 - 35.0 g/dL    RDW 12.5 11.9 - 14.6 %    Platelets 476 384 - 288 K/uL    MPV 9.2 (L) 9.4 - 12.3 FL    nRBC 0.00 0.0 - 0.2 K/uL   Comprehensive Metabolic Panel   Result Value Ref Range    Sodium 131 (L) 133 - 143 mmol/L    Potassium 4.3 3.5 - 5.1 mmol/L    Chloride 100 (L) 101 - 110 mmol/L    CO2 26 21 - 32 mmol/L    Anion Gap 5 2 - 11 mmol/L    Glucose 160 (H) 65 - 100 mg/dL    BUN 14 8 - 23 MG/DL    Creatinine 0.90 0.8 - 1.5 MG/DL    Est, Glom Filt Rate >60 >60 ml/min/1.73m2    Calcium 9.7 8.3 - 10.4 MG/DL    Total Bilirubin 0.5 0.2 - 1.1 MG/DL ALT 18 12 - 65 U/L    AST 16 15 - 37 U/L    Alk Phosphatase 97 50 - 136 U/L    Total Protein 7.1 6.3 - 8.2 g/dL    Albumin 3.7 3.2 - 4.6 g/dL    Globulin 3.4 2.8 - 4.5 g/dL    Albumin/Globulin Ratio 1.1 0.4 - 1.6     Troponin   Result Value Ref Range    Troponin, High Sensitivity 6.3 0 - 14 pg/mL   Troponin   Result Value Ref Range    Troponin, High Sensitivity 7.7 0 - 14 pg/mL        CTA CHEST ABDOMEN PELVIS W WO CONTRAST   Final Result   1. No acute arterial process is identified. No evidence of aortic aneurysm or   dissection. 2. Gallstones. 3. Subacute-appearing bilateral rib fractures. 4. Large stool burden           NIH Stroke Scale  Interval: Baseline  Level of Consciousness (1a): Alert  LOC Questions (1b): Answers both correctly  LOC Commands (1c): Performs both tasks correctly  Best Gaze (2): Normal  Visual (3): No visual loss  Facial Palsy (4): Normal symmetrical movement  Motor Arm, Left (5a): No drift  Motor Arm, Right (5b): No drift  Motor Leg, Left (6a): No drift  Motor Leg, Right (6b): No drift  Limb Ataxia (7): Absent  Sensory (8): Normal  Best Language (9): No aphasia  Dysarthria (10): Normal  Extinction and Inattention (11): No abnormality  Total: 0                Voice dictation software was used during the making of this note. This software is not perfect and grammatical and other typographical errors may be present. This note has not been completely proofread for errors.      Patty Gallardo, DO  01/03/23 1025 [address], DO  01/03/23 103

## 2023-01-18 ENCOUNTER — OFFICE VISIT (OUTPATIENT)
Dept: CARDIOLOGY CLINIC | Age: 78
End: 2023-01-18

## 2023-01-18 VITALS
SYSTOLIC BLOOD PRESSURE: 138 MMHG | BODY MASS INDEX: 22.26 KG/M2 | WEIGHT: 159 LBS | DIASTOLIC BLOOD PRESSURE: 86 MMHG | HEART RATE: 86 BPM | HEIGHT: 71 IN

## 2023-01-18 DIAGNOSIS — R00.2 PALPITATION: ICD-10-CM

## 2023-01-18 DIAGNOSIS — I10 PRIMARY HYPERTENSION: Primary | ICD-10-CM

## 2023-01-18 DIAGNOSIS — E78.00 PURE HYPERCHOLESTEROLEMIA: ICD-10-CM

## 2023-01-18 DIAGNOSIS — I25.10 CORONARY ARTERY DISEASE INVOLVING NATIVE CORONARY ARTERY OF NATIVE HEART WITHOUT ANGINA PECTORIS: ICD-10-CM

## 2023-01-18 RX ORDER — LOSARTAN POTASSIUM 100 MG/1
100 TABLET ORAL DAILY
Qty: 90 TABLET | Refills: 3 | Status: SHIPPED | OUTPATIENT
Start: 2023-01-18

## 2023-01-18 NOTE — PROGRESS NOTES
[facility] CARDIOLOGY  [address] [facility], [address]  PHONE: [phone]      23    NAME:  Alfred Alexander  : 1945  MRN: [id number]       SUBJECTIVE:   Alfred Alexander is a 69 y.o. male seen for a follow up visit regarding the following:     Chief Complaint   Patient presents with    Irregular Heart Beat    Coronary Artery Disease         HPI:    No cp or goss. No orthopnea or pnd. No palpitations or syncope. Past Medical History, Past Surgical History, Family history, Social History, and Medications were all reviewed with the patient today and updated as necessary. Current Outpatient Medications   Medication Sig Dispense Refill    amLODIPine (NORVASC) 5 MG tablet Take 5 mg by mouth daily      apixaban (ELIQUIS) 5 MG TABS tablet Take 1 tablet by mouth 2 times daily (Patient taking differently: Take 5 mg by mouth 2 times daily 4 boxes of 2.5 mg Eliquis given out in office today. Lot.  [id number] Exp: 2023.) 180 tablet 3    acetaminophen (TYLENOL) 500 MG tablet Take 1,000 mg by mouth every 6 hours as needed      calcium citrate-vitamin D (CITRICAL + D) 315-250 MG-UNIT TABS per tablet One am(with centrum) and two pm Indications: OSTEOPOROSIS      clopidogrel (PLAVIX) 75 MG tablet Take 75 mg by mouth daily      flecainide (TAMBOCOR) 100 MG tablet Take 100 mg by mouth 2 times daily      fluticasone (FLONASE) 50 MCG/ACT nasal spray 1 spray by Nasal route daily      latanoprost (XALATAN) 0.005 % ophthalmic solution USE 1 DROP IN BOTH EYES EVERY EVENING      losartan (COZAAR) 100 MG tablet Take 100 mg by mouth 2 times daily      magnesium oxide (MAG-OX) 400 MG tablet Take 400 mg by mouth 2 times daily      metFORMIN (GLUCOPHAGE) 500 MG tablet Take 500 mg by mouth daily (with breakfast)      rosuvastatin (CRESTOR) 40 MG tablet Take 40 mg by mouth 1/2 tab      timolol (TIMOPTIC) 0.25 % ophthalmic solution Apply 1 drop to eye daily       No current facility-administered medications for this visit. Social History     Tobacco Use    Smoking status: Former     Packs/day: 1.00     Types: Cigarettes     Quit date: 3/10/1978     Years since quittin.8    Smokeless tobacco: Never    Tobacco comments:     Quit smoking: quit 1978 new year's jesús   Substance Use Topics    Alcohol use: Yes     Alcohol/week: 7.0 standard drinks              PHYSICAL EXAM:   /86   Pulse 86   Ht 5' 11\" (1.803 m)   Wt 159 lb (72.1 kg)   BMI 22.18 kg/m²    Constitutional: Oriented to person, place, and time. Appears well-developed and well-nourished. Head: Normocephalic and atraumatic. Neck: Neck supple. Cardiovascular: Normal rate and regular rhythm with no murmur -No JVP  Pulmonary/Chest: Breath sounds normal.   Abdominal: Soft. Musculoskeletal: No edema. Neurological: Alert and oriented to person, place, and time. Skin: Skin is warm and dry. Psychiatric: Normal mood and affect. Vitals reviewed. Wt Readings from Last 3 Encounters:   23 159 lb (72.1 kg)   23 165 lb (74.8 kg)   10/25/22 173 lb (78.5 kg)       Medical problems and test results were reviewed with the patient today. ASSESSMENT and PLAN    1. Primary hypertension  Stable. Continue cozaar and reduce to qd      2. Pure hypercholesterolemia  Stable. Continue crestor      3. Palpitation  Stable. Continue tambocor and mag    4. Pre-op low cv risk    Return in about 6 months (around 2023).          Dimas Valdez MD  2023  3:58 PM

## 2023-02-17 ENCOUNTER — TELEPHONE (OUTPATIENT)
Dept: CARDIOLOGY CLINIC | Age: 78
End: 2023-02-17

## 2023-02-17 RX ORDER — ASPIRIN 81 MG/1
81 TABLET ORAL DAILY
COMMUNITY

## 2023-02-17 NOTE — TELEPHONE ENCOUNTER
Confused because Dr Sharyle Hail wants him to take some meds that Dr Allen Allen and vice versa and he dont know what to do.  Please call

## 2023-02-17 NOTE — TELEPHONE ENCOUNTER
Patient/wife called, states that Dr. Christina De La Cruz will not do prostate biopsy without patient being off his Plavix 7 days prior to procedure. I informed patient per note on 11-29-22 Plavix is the equivalent of aspirin. Wife states that Dr Christina De La Cruz still will not do biopsy if patient remains on Plavix. Would like to get this resolved so patient can have biopsy. Wife informed that Dr. Megha Bradford would be sent this note, call back with doctors response.  Wife voiced understanding//[email or web address]

## 2023-02-17 NOTE — TELEPHONE ENCOUNTER
Start asa 81mg qd and stop plavix     Wife called with Dr Bar Lay response. Medication list updated.  Wife voiced understanding//[email or web address]
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
## 2023-02-20 NOTE — PROGRESS NOTES
Joselin Drake  : 1945  Primary: [facility] (Medicare Managed)  Secondary:  [address] @ [address] [place] [id number]  Phone: [phone]  Fax: [phone] Plan Frequency: 1 to 2 times a week for 8 weeks  Plan of Care/Certification Expiration Date: 23 (plan starting 23)    PT Visit Info:  Plan Frequency: 1 to 2 times a week for 8 weeks  Plan of Care/Certification Expiration Date: 23 (plan starting 23)    Visit Count:  1    OUTPATIENT PHYSICAL THERAPY:OP NOTE TYPE: Treatment Note 2023       Episode  }Appt Desk             Pain in Left Knee (M25.562)  Generalized Muscle Weakness (M62.81)  Complex tear of medial meniscus, current injury, left knee, subsequent encounter  Chondromalacia, left kneeComplex tear of medial meniscus, current injury, left knee, subsequent encounter [S83.232D]  Chondromalacia, left knee [J80.007]  Medical/Referring Diagnosis:  Complex tear of medial meniscus, current injury, left knee, subsequent encounter [S83.232D]  Chondromalacia, left knee [Q12.670]  Referring Physician:  Owen Cantu MD MD Orders:  PT Eval and Treat   Date of Onset:  Onset Date: 23 (Patient had arthroscopic surgical intervention performed on date listed on L knee.)     Allergies:   Patient has no known allergies. Restrictions/Precautions:  No data recorded  No data recorded   Interventions Planned (Treatment may consist of any combination of the following):    Current Treatment Recommendations: Strengthening; ROM; Balance training; Functional mobility training; Transfer training; Endurance training; Gait training; Stair training; Neuromuscular re-education; Manual; Home exercise program; Modalities; Positioning; Dry needling; Therapeutic activities     Subjective Comments:  Patient agreeable to therapy and optimistic it will help and can improve function.   Initial:}    6/10Post Session: 5/10  Medications Last Reviewed:  2/21/2023  Updated Objective Findings:  See evaluation note from today  Treatment   THERAPEUTIC EXERCISE: (15 minutes):    Exercises per grid below to improve mobility, strength, balance, and coordination. Required minimal visual, verbal, manual, and tactile cues to promote proper body alignment, promote proper body posture, promote proper body mechanics, and promote proper body breathing techniques. Progressed resistance, range, repetitions, and complexity of movement as indicated. Date:  2/21/23 Date:   Date:     Activity/Exercise Parameters Parameters Parameters   Heel Prop 5 min with towel     Quad Set 2x5 holding 5 sec     Leg Raise 2x5 with quad set     Calf stretch 3x30 sec with strap     Heel Slide 2x10 with strap     Hip Abduction 2x10 standing with UE support     Sit to stand X5 from table         Time spent with patient reviewing proper muscle recruitment and technique with exercises. MANUAL THERAPY: (10 minutes):   Joint mobilization and Soft tissue mobilization was utilized and necessary because of the patient's restricted joint motion and painful spasm. Grade 2-3 patellofemoral mobilization    MODALITIES: (0 minutes):        None today      HEP: As above; handouts given to patient for all exercises. Treatment/Session Summary:    Treatment Assessment:  Patient tolerated session well with ability to verbalize understanding of exercises. Patient required additional cuing for technique on heel slide and heel prop. Communication/Consultation:   Discussed HEP and POC with patient, he was able to verbalize and demonstrate understanding. Equipment provided today:  None  Recommendations/Intent for next treatment session: Next visit will focus on participation to improve activity tolerance and ability to navigate environment. .    Total Treatment Billable Duration:  55 minutes (30 min eval, 15 min there ex, 10 min manual)  Time In: 0900  Time Out: Rajinder, PT       Charge Capture  }Post Session Pain  PT Visit Info  [facility] Portal  MD Guidelines  Scanned Media  Benefits  MyChart    Future Appointments   Date Time Provider Dhiraj Denise   4/26/2023  8:30 AM [facility]   4/26/2023  9:00 AM Mateo Jacobson Standard, APRN - CNP [facility]   7/6/2023  8:15 AM Jose Elias Gibson MD [facility]

## 2023-02-21 ENCOUNTER — HOSPITAL ENCOUNTER (OUTPATIENT)
Dept: PHYSICAL THERAPY | Age: 78
Setting detail: RECURRING SERIES
Discharge: HOME | End: 2023-02-24
Payer: MEDICARE

## 2023-02-21 PROCEDURE — 97162 PT EVAL MOD COMPLEX 30 MIN: CPT

## 2023-02-21 PROCEDURE — 97110 THERAPEUTIC EXERCISES: CPT

## 2023-02-21 PROCEDURE — 97140 MANUAL THERAPY 1/> REGIONS: CPT

## 2023-02-21 ASSESSMENT — PAIN SCALES - GENERAL: PAINLEVEL_OUTOF10: 6

## 2023-02-23 ENCOUNTER — HOSPITAL ENCOUNTER (OUTPATIENT)
Dept: PHYSICAL THERAPY | Age: 78
Setting detail: RECURRING SERIES
Discharge: HOME | End: 2023-02-26
Payer: MEDICARE

## 2023-02-23 PROCEDURE — 97110 THERAPEUTIC EXERCISES: CPT

## 2023-02-23 ASSESSMENT — PAIN SCALES - GENERAL: PAINLEVEL_OUTOF10: 3

## 2023-02-23 NOTE — PROGRESS NOTES
Jb Titus  : 1945  Primary: [facility] (Medicare Managed)  Secondary:  [address] @ [address] [place], [place] [address]  Phone: [phone]  Fax: [phone] Plan Frequency: 1 to 2 times a week for 8 weeks  Plan of Care/Certification Expiration Date: 23 (plan starting 23)      PT Visit Info:  Plan Frequency: 1 to 2 times a week for 8 weeks  Plan of Care/Certification Expiration Date: 23 (plan starting 23)      Visit Count:  2    OUTPATIENT PHYSICAL THERAPY:OP NOTE TYPE: Treatment Note 2023       Episode  }Appt Desk             Pain in Left Knee (M25.562)  Generalized Muscle Weakness (M62.81)  Complex tear of medial meniscus, current injury, left knee, subsequent encounter  Chondromalacia, left kneeComplex tear of medial meniscus, current injury, left knee, subsequent encounter [S83.232D]  Chondromalacia, left knee [V87.076]  Medical/Referring Diagnosis:  Complex tear of medial meniscus, current injury, left knee, subsequent encounter [S83.232D]  Chondromalacia, left knee [B17.299]  Referring Physician:  Mustapha Bragg MD MD Orders:  PT Eval and Treat   Date of Onset:  Onset Date: 23 (Patient had arthroscopic surgical intervention performed on date listed on L knee.)     Allergies:   Patient has no known allergies. Restrictions/Precautions:  No data recorded  No data recorded   Interventions Planned (Treatment may consist of any combination of the following):    Current Treatment Recommendations: Strengthening; ROM; Balance training; Functional mobility training; Transfer training; Endurance training; Gait training; Stair training; Neuromuscular re-education; Manual; Home exercise program; Modalities; Positioning; Dry needling; Therapeutic activities     Subjective Comments:  Pt. reported getting along well with no falls and sleeping well.   Initial:}    3/10Post Session:        /10  Medications Last Reviewed:  2/23/2023  Updated Objective Findings:   Pt. Entered clinic with cane in his left hand and leaning onto the cane when patient educated the correct cane placement in right hand pt. Demonstrated better gait and posture. Treatment   THERAPEUTIC EXERCISE: (55 minutes):    Exercises per grid below to improve mobility, strength, balance, and coordination. Required minimal visual, verbal, manual, and tactile cues to promote proper body alignment, promote proper body posture, promote proper body mechanics, and promote proper body breathing techniques. Progressed resistance, range, repetitions, and complexity of movement as indicated. Date:  2/21/23 Date:  2/23/23 Date:     Activity/Exercise Parameters Parameters Parameters   Heel Prop 5 min with towel 1/2 foam roller in supine x 5 mins    Quad Set 2x5 holding 5 sec X 10 reps 5 sec hold each     Leg Raise 2x5 with quad set X 10 reps with quad set    Calf stretch 3x30 sec with strap Incline 4x30 sec hold each     Heel Slide 2x10 with strap Strap and sliding board supine x 3 mins    Hip Abduction 2x10 standing with UE support 2x10 reps standing at bar with BUE support    Nu step  Level 1 x 8 mins     Long arc quads  X 20 reps seated 5 sec hold    Side stepping   At wall x 10 reps x 10 feet each    Sit to stand X5 from table Chair plus one cushion        Time spent with patient reviewing proper muscle recruitment and technique with exercises. MANUAL THERAPY: (0 minutes): NONE TODAY  Joint mobilization and Soft tissue mobilization was utilized and necessary because of the patient's restricted joint motion and painful spasm. Grade 2-3 patellofemoral mobilization    MODALITIES: (0 minutes):        None today      HEP: As above; handouts given to patient for all exercises. Treatment/Session Summary:    Treatment Assessment:Pt. Required multiple verbal & tactile cuing to perform exercises correctly with proper technique and body mechanics. Communication/Consultation:   Discussed HEP and POC with patient, he was able to verbalize and demonstrate understanding. Equipment provided today:  None  Recommendations/Intent for next treatment session: Next visit will focus on participation to improve activity tolerance and ability to navigate environment. .    Total Treatment Billable Duration:  55 minutes   Time In: 1100  Time Out: 1200    LUCY MENDENHALL PTA       Charge Capture  }Post Session Pain  PT Visit Info  [facility] Portal  MD Guidelines  Scanned Media  Benefits  MyChart    Future Appointments   Date Time Provider Dhiraj Walker   2/28/2023  2:30 PM Joya Wing PTA [facility]   3/3/2023  9:00 AM Raymondo Thom, PT [facility]   3/7/2023  9:00 AM Raymondo Thom, PT [facility]   3/9/2023  9:00 AM Raymondo Thom, PT [facility]   3/14/2023 11:00 AM Raymondo Thom, PT [facility]   3/16/2023  9:00 AM Raymondo Thom, PT [facility]   3/21/2023  9:00 AM Raymondo Thom, PT [facility]   3/23/2023  9:00 AM Raymondo Thom, PT [facility]   3/28/2023  9:00 AM Raymondo Thom, PT [facility]   3/30/2023  9:00 AM Raymondo Thom, PT [facility]   4/4/2023  9:00 AM Raymondo Thom, PT [facility]   4/6/2023  9:00 AM Raymondo Thom, PT [facility]   4/11/2023  9:00 AM Raymondo Thom, PT [facility]   4/13/2023  9:00 AM Raymondo Thom, PT [facility]   4/18/2023  9:00 AM Raymondo Thom, PT [facility]   4/26/2023  8:30 AM [facility]   4/26/2023  9:00 AM Serafin Coffman, APRN - CNP [facility]   7/6/2023  8:15 AM Vlad Genao MD [facility]

## 2023-02-28 ENCOUNTER — HOSPITAL ENCOUNTER (OUTPATIENT)
Dept: PHYSICAL THERAPY | Age: 78
Setting detail: RECURRING SERIES
Discharge: HOME | End: 2023-03-03
Payer: MEDICARE

## 2023-02-28 PROCEDURE — 97110 THERAPEUTIC EXERCISES: CPT

## 2023-02-28 ASSESSMENT — PAIN SCALES - GENERAL: PAINLEVEL_OUTOF10: 5

## 2023-02-28 NOTE — PROGRESS NOTES
Luis Carlos Heath  : 1945  Primary: Aman Rai Hmo (Medicare Managed)  Secondary:  [address] @ [address] [place], [place] [address]  Phone: [phone]  Fax: [phone] Plan Frequency: 1 to 2 times a week for 8 weeks  Plan of Care/Certification Expiration Date: 23 (plan starting 23)      PT Visit Info:  Plan Frequency: 1 to 2 times a week for 8 weeks  Plan of Care/Certification Expiration Date: 23 (plan starting 23)      Visit Count:  3    OUTPATIENT PHYSICAL THERAPY:OP NOTE TYPE: Treatment Note 2023       Episode  }Appt Desk             Pain in Left Knee (M25.562)  Generalized Muscle Weakness (M62.81)  Complex tear of medial meniscus, current injury, left knee, subsequent encounter  Chondromalacia, left kneeComplex tear of medial meniscus, current injury, left knee, subsequent encounter [S83.232D]  Chondromalacia, left knee [N42.090]  Medical/Referring Diagnosis:  Complex tear of medial meniscus, current injury, left knee, subsequent encounter [S83.232D]  Chondromalacia, left knee [R55.593]  Referring Physician:  Mark Durand MD MD Orders:  PT Eval and Treat   Date of Onset:  Onset Date: 23 (Patient had arthroscopic surgical intervention performed on date listed on L knee.)     Allergies:   Patient has no known allergies. Restrictions/Precautions:  No data recorded  No data recorded   Interventions Planned (Treatment may consist of any combination of the following):    Current Treatment Recommendations: Strengthening; ROM; Balance training; Functional mobility training; Transfer training; Endurance training; Gait training; Stair training; Neuromuscular re-education; Manual; Home exercise program; Modalities; Positioning; Dry needling;  Therapeutic activities     Subjective Comments:  Pt. reported no falls and getting along well  Initial:}    5/10Post Session:       3/10  Medications Last Reviewed: 2/28/2023  Updated Objective Findings:   Pt. Required constant multiple verbal and tactile cuing to perform exercises correctly and with proper body mechanics  Treatment   THERAPEUTIC EXERCISE: (55 minutes):    Exercises per grid below to improve mobility, strength, balance, and coordination. Required minimal visual, verbal, manual, and tactile cues to promote proper body alignment, promote proper body posture, promote proper body mechanics, and promote proper body breathing techniques. Progressed resistance, range, repetitions, and complexity of movement as indicated. Date:  2/21/23 Date:  2/23/23 Date:  2/28/23   Activity/Exercise Parameters Parameters Parameters   Heel Prop 5 min with towel 1/2 foam roller in supine x 5 mins 1/2 roller in supine x 5 mins    Quad Set 2x5 holding 5 sec X 10 reps 5 sec hold each  X 20 reps 5 sec hold each    Leg Raise 2x5 with quad set X 10 reps with quad set Supine 2x10 reps BLE's with quad set    Calf stretch 3x30 sec with strap Incline 4x30 sec hold each  Incline 4x30 sec hole each    Heel Slide 2x10 with strap Strap and sliding board supine x 3 mins Active on red physio ball    Hip Abduction 2x10 standing with UE support 2x10 reps standing at bar with BUE support Standing at bar 2x15 reps at bar    Nu step  Level 1 x 8 mins  2 x 10 mins    Long arc quads  X 20 reps seated 5 sec hold X 20 reps seated 5 sec hold BLE's    Side stepping   At wall x 10 reps x 10 feet each At wal x 10 reps x 10 ft. Sit to stand X5 from table Chair plus one cushion Chair plus one cushion    Flexion & extension    Supine on red ball x 3 mins    Trunk rotation    Red ball supine red ball x 20 reps with TA                    Time spent with patient reviewing proper muscle recruitment and technique with exercises. MANUAL THERAPY: (0 minutes): NONE TODAY  Joint mobilization and Soft tissue mobilization was utilized and necessary because of the patient's restricted joint motion and painful spasm. Grade 2-3 patellofemoral mobilization    MODALITIES: (0 minutes):        None today      HEP: As above; handouts given to patient for all exercises. Treatment/Session Summary:    Treatment Assessment:Pt. Required multiple verbal & tactile cuing to perform exercises correctly with proper technique and body mechanics. Communication/Consultation:   Discussed HEP and POC with patient, he was able to verbalize and demonstrate understanding. Equipment provided today:  None  Recommendations/Intent for next treatment session: Next visit will focus on participation to improve activity tolerance and ability to navigate environment. .    Total Treatment Billable Duration:  55 minutes   Time In: 1430  Time Out: CHRIS Melba Snow 51 BUNCH, PTA       Charge Capture  }Post Session Pain  PT Visit Info  MedBridge Portal  MD Guidelines  Scanned Media  Benefits  Blabroom    Future Appointments   Date Time Provider Dhiraj Walker   3/3/2023  9:00 AM Ama Ros, PT [facility]   3/7/2023  9:00 AM Ama Ros, PT [facility]   3/9/2023  9:00 AM Ama Ros, PT [facility]   3/14/2023 11:00 AM Ama Ros, PT [facility]   3/16/2023  9:00 AM Ama Ros, PT [facility]   3/21/2023  9:00 AM Ama Ros, PT [facility]   3/23/2023  9:00 AM Ama Ros, PT [facility]   3/28/2023  9:00 AM Ama Ros, PT [facility]   3/30/2023  9:00 AM Ama Ros, PT [facility]   4/4/2023  9:00 AM Ama Ros, PT [facility]   4/6/2023  9:00 AM Ama Ros, PT [facility]   4/11/2023  9:00 AM Ama Ros, PT [facility]   4/13/2023  9:00 AM Ama Ros, PT [facility]   4/18/2023  9:00 AM Ama Ros, PT [facility]   4/26/2023  8:30 AM [facility]   4/26/2023  9:00 AM Caden Dies Standard, APRN - CNP [facility]   7/6/2023  8:15 AM Josephine Villa MD [facility]

## 2023-03-03 ENCOUNTER — HOSPITAL ENCOUNTER (OUTPATIENT)
Dept: PHYSICAL THERAPY | Age: 78
Setting detail: RECURRING SERIES
Discharge: HOME | End: 2023-03-06
Payer: MEDICARE

## 2023-03-03 PROCEDURE — 97110 THERAPEUTIC EXERCISES: CPT

## 2023-03-03 PROCEDURE — 97140 MANUAL THERAPY 1/> REGIONS: CPT

## 2023-03-03 ASSESSMENT — PAIN SCALES - GENERAL: PAINLEVEL_OUTOF10: 4

## 2023-03-03 NOTE — PROGRESS NOTES
Alberto Hunter  : 1945  Primary: Mary Rai Hmo (Medicare Managed)  Secondary:  [address] @ [address] [place], [place] [address]  Phone: [phone]  Fax: [phone] Plan Frequency: 1 to 2 times a week for 8 weeks  Plan of Care/Certification Expiration Date: 23 (plan starting 23)      PT Visit Info:  Plan Frequency: 1 to 2 times a week for 8 weeks  Plan of Care/Certification Expiration Date: 23 (plan starting 23)      Visit Count:  4    OUTPATIENT PHYSICAL THERAPY:OP NOTE TYPE: Treatment Note 3/3/2023       Episode  }Appt Desk             Pain in Left Knee (M25.562)  Generalized Muscle Weakness (M62.81)  Complex tear of medial meniscus, current injury, left knee, subsequent encounter  Chondromalacia, left kneeComplex tear of medial meniscus, current injury, left knee, subsequent encounter [S83.232D]  Chondromalacia, left knee [R21.957]  Medical/Referring Diagnosis:  Complex tear of medial meniscus, current injury, left knee, subsequent encounter [S83.232D]  Chondromalacia, left knee [C82.824]  Referring Physician:  Mayte Sim MD MD Orders:  PT Eval and Treat   Date of Onset:  Onset Date: 23 (Patient had arthroscopic surgical intervention performed on date listed on L knee.)     Allergies:   Patient has no known allergies. Restrictions/Precautions:  No data recorded  No data recorded   Interventions Planned (Treatment may consist of any combination of the following):    Current Treatment Recommendations: Strengthening; ROM; Balance training; Functional mobility training; Transfer training; Endurance training; Gait training; Stair training; Neuromuscular re-education; Manual; Home exercise program; Modalities; Positioning; Dry needling; Therapeutic activities     Subjective Comments:  Patient reports his knee still hurting since surgery.  State he feels some better with therapy  Initial:}    4/10Post Session: 1/10  Medications Last Reviewed:  3/3/2023  Updated Objective Findings:   Patient requires cuing for josé manuel and technique with exercises  Treatment   THERAPEUTIC EXERCISE: (40 minutes):    Exercises per grid below to improve mobility, strength, balance, and coordination. Required minimal visual, verbal, manual, and tactile cues to promote proper body alignment, promote proper body posture, promote proper body mechanics, and promote proper body breathing techniques. Progressed resistance, range, repetitions, and complexity of movement as indicated. Date:  3/3/2023 Date:  2/23/23 Date:  2/28/23   Activity/Exercise Parameters Parameters Parameters   Heel Prop 3 minutes with patellar mobilizations 1/2 foam roller in supine x 5 mins 1/2 roller in supine x 5 mins    Quad Set 10 x 10 seconds X 10 reps 5 sec hold each  X 20 reps 5 sec hold each    Leg Raise 2 x 10 X 10 reps with quad set Supine 2x10 reps BLE's with quad set    Calf stretch Slant board, 3 x 30 seconds Incline 4x30 sec hold each  Incline 4x30 sec hole each    Heel Slide Active 2 x 10 Strap and sliding board supine x 3 mins Active on red physio ball    Hip Abduction Standing, 2 x 10 each 2x10 reps standing at bar with BUE support Standing at bar 2x15 reps at bar    Nu step Level 1, x 10 minutes Level 1 x 8 mins  2 x 10 mins    Long arc quads Plinth, 2 x 10 X 20 reps seated 5 sec hold X 20 reps seated 5 sec hold BLE's    Side stepping  5 x 10 ft at bar At wall x 10 reps x 10 feet each At wal x 10 reps x 10 ft. Sit to stand Plinth, 2 x 10 Chair plus one cushion Chair plus one cushion    Calf raises 2 x 10     Flexion & extension  ---  Supine on red ball x 3 mins    Trunk rotation  ---  Red ball supine red ball x 20 reps with TA                    Time spent with patient reviewing proper muscle recruitment and technique with exercises.      MANUAL THERAPY: (13 minutes):   Joint mobilization and Soft tissue mobilization was utilized and necessary because of the patient's restricted joint motion and painful spasm. Grade 2-3 patellofemoral mobilization with knee extended   Soft tissue mobilizations with and without suction cup to anterior knee and port holes to decrease tightness and adhesions    MODALITIES: (0 minutes):        None today      HEP: As above; handouts given to patient for all exercises. Treatment/Session Summary:    Treatment Assessment:  Patient tolerated exercises well with cuing for technique and josé manuel. Decreased pain at end of session. Good response to manual over anterior knee to decrease tightness and pain  Communication/Consultation:   Reviewed HEP  Equipment provided today:  None  Recommendations/Intent for next treatment session: Next visit will focus on participation to improve activity tolerance and ability to navigate environment. .    Total Treatment Billable Duration:  53 minutes   Time In: 0900  Time Out: 9656    Rehana Overton, PT       Charge Capture  }Post Session Pain  PT Visit Info  [facility] Portal  MD Guidelines  Scanned Media  Benefits  MyChart    Future Appointments   Date Time Provider Dhiraj Walker   3/7/2023  9:00 AM Rehana Brooklynn, PT [facility]   3/9/2023  9:00 AM Rehana Brooklynn, PT [facility]   3/14/2023 11:00 AM Rehana Brooklynn, PT [facility]   3/16/2023  9:00 AM Rehana Brooklynn, PT [facility]   3/21/2023  9:00 AM Rehana Brooklynn, PT [facility]   3/23/2023  9:00 AM Rehana Brooklynn, PT [facility]   3/28/2023  9:00 AM Rehana Brooklynn, PT [facility]   3/30/2023  9:00 AM Rehana Brooklynn, PT [facility]   4/4/2023  9:00 AM Rehana Brooklynn, PT [facility]   4/6/2023  9:00 AM Rehana Brooklynn, PT [facility]   4/11/2023  9:00 AM Rehana Brooklynn, PT [facility]   4/13/2023  9:00 AM Rehana Brooklynn, PT [facility]   4/18/2023  9:00 AM Rehana Brooklynn, PT [facility]   4/26/2023  8:30 AM [facility]TR[facility] 1 [facility]   4/26/2023  9:00 AM Nancy Coffman, APRN - CNP [facility]   7/6/2023  8:15 AM Luis Parker MD [facility]

## 2023-03-07 ENCOUNTER — HOSPITAL ENCOUNTER (OUTPATIENT)
Dept: PHYSICAL THERAPY | Age: 78
Setting detail: RECURRING SERIES
Discharge: HOME | End: 2023-03-10
Payer: MEDICARE

## 2023-03-07 PROCEDURE — 97110 THERAPEUTIC EXERCISES: CPT

## 2023-03-07 PROCEDURE — 97140 MANUAL THERAPY 1/> REGIONS: CPT

## 2023-03-07 ASSESSMENT — PAIN SCALES - GENERAL: PAINLEVEL_OUTOF10: 4

## 2023-03-07 NOTE — PROGRESS NOTES
Raquel Hastings  : 1945  Primary: Ronna Rai Hmo (Medicare Managed)  Secondary:  [address] @ [address] [place] [address]  Phone: [phone]  Fax: [phone] Plan Frequency: 1 to 2 times a week for 8 weeks  Plan of Care/Certification Expiration Date: 23 (plan starting 23)      PT Visit Info:  Plan Frequency: 1 to 2 times a week for 8 weeks  Plan of Care/Certification Expiration Date: 23 (plan starting 23)      Visit Count:  5    OUTPATIENT PHYSICAL THERAPY:OP NOTE TYPE: Treatment Note 3/7/2023       Episode  }Appt Desk             Pain in Left Knee (M25.562)  Generalized Muscle Weakness (M62.81)  Complex tear of medial meniscus, current injury, left knee, subsequent encounter  Chondromalacia, left kneeComplex tear of medial meniscus, current injury, left knee, subsequent encounter [S83.232D]  Chondromalacia, left knee [M61.281]  Medical/Referring Diagnosis:  Complex tear of medial meniscus, current injury, left knee, subsequent encounter [S83.232D]  Chondromalacia, left knee [I01.862]  Referring Physician:  Jimbo Fuentes MD MD Orders:  PT Eval and Treat   Date of Onset:  Onset Date: 23 (Patient had arthroscopic surgical intervention performed on date listed on L knee.)     Allergies:   Patient has no known allergies. Restrictions/Precautions:  No data recorded  No data recorded   Interventions Planned (Treatment may consist of any combination of the following):    Current Treatment Recommendations: Strengthening; ROM; Balance training; Functional mobility training; Transfer training; Endurance training; Gait training; Stair training; Neuromuscular re-education; Manual; Home exercise program; Modalities; Positioning; Dry needling; Therapeutic activities     Subjective Comments:  Patient reports still having pain in the front of his L knee and being concerned about it.  States it is worse in the mornings and with some activities like truck transfers. Initial:}    4/10Post Session:       1/10  Medications Last Reviewed:  3/7/2023  Updated Objective Findings:   Less tenderness with manual therapy today    Treatment   THERAPEUTIC EXERCISE: (38 minutes):    Exercises per grid below to improve mobility, strength, balance, and coordination. Required minimal visual, verbal, manual, and tactile cues to promote proper body alignment, promote proper body posture, promote proper body mechanics, and promote proper body breathing techniques. Progressed resistance, range, repetitions, and complexity of movement as indicated. Date:  3/3/2023 Date:  3/7/2023 Date:  2/28/23   Activity/Exercise Parameters Parameters Parameters   Heel Prop 3 minutes with patellar mobilizations Foam roll, with heat, x 8 minutes 1/2 roller in supine x 5 mins    Quad Set 10 x 10 seconds 20 x 5 seconds X 20 reps 5 sec hold each    Leg Raise 2 x 10 Supine, quad set, 2 x 10 Supine 2x10 reps BLE's with quad set    Hamstring stretch  Supine, strap, 3 x 30 seconds    Calf stretch Slant board, 3 x 30 seconds Slant board, 3 x 30 seconds Incline 4x30 sec hole each    Heel Slide Active 2 x 10 --- Active on red physio ball    Hip Abduction Standing, 2 x 10 each --- Standing at bar 2x15 reps at bar    Nu step Level 1, x 10 minutes Level 2, x 10 minutes 2 x 10 mins    Long arc quads Plinth, 2 x 10 Plinth, 2 x 10 X 20 reps seated 5 sec hold BLE's    Side stepping  5 x 10 ft at bar --- At wal x 10 reps x 10 ft. Sit to stand Plinth, 2 x 10 Plinth, 2 x 10 Chair plus one cushion    Calf raises 2 x 10 ---    Flexion & extension  --- Supine, feet on ball, 2 x 10 Supine on red ball x 3 mins    Trunk rotation  ---  Red ball supine red ball x 20 reps with TA                    Time spent with patient reviewing proper muscle recruitment and technique with exercises.      MANUAL THERAPY: (15 minutes):   Joint mobilization and Soft tissue mobilization was utilized and necessary because of the patient's restricted joint motion and painful spasm. Grade 2-3 patellofemoral mobilization with knee extended   Soft tissue mobilizations with and without wooden tool to anterior knee and port holes to decrease tightness and adhesions    MODALITIES: (8 minutes): *  Hot Pack Therapy in order to provide analgesia and relieve muscle spasm. With heel prop exercise    HEP: As above; handouts given to patient for all exercises. Treatment/Session Summary:    Treatment Assessment:  Patient with decreased pain following manual therapy and exercises today. Reviewed exercises for home and use of ice/ heat as needed. Communication/Consultation:   Reviewed HEP  Equipment provided today:  None  Recommendations/Intent for next treatment session: Next visit will focus on participation to improve activity tolerance and ability to navigate environment.     Total Treatment Billable Duration:  53 minutes   Time In: 0902  Time Out: 6576    Schoolcraft Plume, PT       Charge Capture  }Post Session Pain  PT Visit Info  MedBridge Portal  MD Guidelines  Scanned Media  Benefits  [place]    Future Appointments   Date Time Provider Dhiraj Walker   3/9/2023  9:00 AM Schoolcraft Plume, PT [facility]   3/14/2023 11:00 AM Schoolcraft Plume, PT [facility]   3/16/2023  9:00 AM Schoolcraft Plume, PT [facility]   3/21/2023  9:00 AM Schoolcraft Plume, PT [facility]   3/23/2023  9:00 AM Schoolcraft Plume, PT [facility]   3/28/2023  9:00 AM Schoolcraft Plume, PT [facility]   3/30/2023  9:00 AM Vaibhav Plume, PT [facility]   4/4/2023  9:00 AM Schoolcraft Plume, PT [facility]   4/6/2023  9:00 AM Vaibhav Plume, PT [facility]   4/11/2023  9:00 AM Schoolcraft Plume, PT [facility]   4/13/2023  9:00 AM Schoolcraft Plume, PT [facility]   4/18/2023  9:00 AM Schoolcraft Plume, PT [facility]   4/26/2023  8:30 AM [facility]TRASOUND 1 [facility]   4/26/2023  9:00 AM Georgine Severs Standard, APRN - CNP [facility]   7/6/2023  8:15 AM MD ASHLEIGH BabinL AMB

## 2023-03-09 ENCOUNTER — HOSPITAL ENCOUNTER (OUTPATIENT)
Dept: PHYSICAL THERAPY | Age: 78
Setting detail: RECURRING SERIES
Discharge: HOME | End: 2023-03-12
Payer: MEDICARE

## 2023-03-09 PROCEDURE — 97110 THERAPEUTIC EXERCISES: CPT

## 2023-03-09 PROCEDURE — 97140 MANUAL THERAPY 1/> REGIONS: CPT

## 2023-03-09 ASSESSMENT — PAIN SCALES - GENERAL: PAINLEVEL_OUTOF10: 5

## 2023-03-09 NOTE — PROGRESS NOTES
Darwin Lerner  : 1945  Primary: Lexi Moss Plus Hmo (Medicare Managed)  Secondary:  [address] @ [address] [place] [address]  Phone: [phone]  Fax: [phone] Plan Frequency: 1 to 2 times a week for 8 weeks  Plan of Care/Certification Expiration Date: 23 (plan starting 23)      PT Visit Info:  Plan Frequency: 1 to 2 times a week for 8 weeks  Plan of Care/Certification Expiration Date: 23 (plan starting 23)      Visit Count:  6    OUTPATIENT PHYSICAL THERAPY:OP NOTE TYPE: Treatment Note 3/9/2023       Episode  }Appt Desk             Pain in Left Knee (M25.562)  Generalized Muscle Weakness (M62.81)  Complex tear of medial meniscus, current injury, left knee, subsequent encounter  Chondromalacia, left kneeComplex tear of medial meniscus, current injury, left knee, subsequent encounter [S83.232D]  Chondromalacia, left knee [A65.383]  Medical/Referring Diagnosis:  Complex tear of medial meniscus, current injury, left knee, subsequent encounter [S83.232D]  Chondromalacia, left knee [Z68.917]  Referring Physician:  Alka Watkins MD MD Orders:  PT Eval and Treat   Date of Onset:  Onset Date: 23 (Patient had arthroscopic surgical intervention performed on date listed on L knee.)     Allergies:   Patient has no known allergies. Restrictions/Precautions:  No data recorded  No data recorded   Interventions Planned (Treatment may consist of any combination of the following):    Current Treatment Recommendations: Strengthening; ROM; Balance training; Functional mobility training; Transfer training; Endurance training; Gait training; Stair training; Neuromuscular re-education; Manual; Home exercise program; Modalities; Positioning; Dry needling; Therapeutic activities     Subjective Comments:  Patient states he is ready to have a little less soreness in his knee.   Initial:}    5/10Post Session:        /10  Medications Last Reviewed:  3/9/2023  Updated Objective Findings:   Improving with exercise tolerance    Treatment   THERAPEUTIC EXERCISE: (40 minutes):    Exercises per grid below to improve mobility, strength, balance, and coordination. Required minimal visual, verbal, manual, and tactile cues to promote proper body alignment, promote proper body posture, promote proper body mechanics, and promote proper body breathing techniques. Progressed resistance, range, repetitions, and complexity of movement as indicated. Date:  3/3/2023 Date:  3/7/2023 Date:  3/9/2023   Activity/Exercise Parameters Parameters Parameters   Heel Prop 3 minutes with patellar mobilizations Foam roll, with heat, x 8 minutes Foam roll with heat, 8 minutes   Quad Set 10 x 10 seconds 20 x 5 seconds 20 x 5 seconds   Leg Raise 2 x 10 Supine, quad set, 2 x 10 Supine, quad set, 2 x 10   SAQ --- --- 2 x 10   Hamstring stretch  Supine, strap, 3 x 30 seconds ---   Calf stretch Slant board, 3 x 30 seconds Slant board, 3 x 30 seconds Slant board, 3 x 30 seconds    Heel Slide Active 2 x 10 --- Active, 2 x 10   Hip Abduction Standing, 2 x 10 each --- Sidelying, 2 x 10    Standing, 2 x 10 each   Nu step Level 1, x 10 minutes Level 2, x 10 minutes Level 3, x 10 minutes   Long arc quads Plinth, 2 x 10 Plinth, 2 x 10 Plinth, 3 x 10   Side stepping  5 x 10 ft at bar --- 2 x 10 ft each   Sit to stand Plinth, 2 x 10 Plinth, 2 x 10 Plinth, 3 x 10   Calf raises 2 x 10 --- 2 x 10   Flexion & extension  --- Supine, feet on ball, 2 x 10 ---   Trunk rotation  ---  ---                   Time spent with patient reviewing proper muscle recruitment and technique with exercises. MANUAL THERAPY: (13 minutes):   Joint mobilization and Soft tissue mobilization was utilized and necessary because of the patient's restricted joint motion and painful spasm.    Grade 2-3 patellofemoral mobilization with knee extended   Soft tissue mobilizations with and without wooden tool to anterior knee and port holes to decrease tightness and adhesions    MODALITIES: (8 minutes): *  Hot Pack Therapy in order to provide analgesia and relieve muscle spasm. With heel prop exercise    HEP: As above; handouts given to patient for all exercises. Treatment/Session Summary:    Treatment Assessment:  Decreased pain at end of session. Spoke with patient about exercises and activity modification at home to help with soreness/ pain throughout the day. Communication/Consultation:   Reviewed HEP  Equipment provided today:  None  Recommendations/Intent for next treatment session: Next visit will focus on participation to improve activity tolerance and ability to navigate environment.     Total Treatment Billable Duration:  53 minutes   Time In: 2630  Time Out: 3927    Florentin Susan, PT       Charge Capture  }Post Session Pain  PT Visit Info  [facility] Portal  MD Guidelines  Scanned Media  Benefits  MyChart    Future Appointments   Date Time Provider Dhiraj Denise   3/14/2023 11:00 AM Florenitn Susan, PT [facility]   3/16/2023  9:00 AM Florentin Susan, PT [facility]   3/21/2023  9:00 AM Florentin Susan, PT [facility]   3/23/2023  9:00 AM Florentin Susan, PT [facility]   3/28/2023  9:00 AM Florentin Susan, PT [facility]   3/30/2023  9:00 AM Florentin Susan, PT [facility]   4/4/2023  9:00 AM Florentin Susan, PT [facility]   4/6/2023  9:00 AM Florentin Susan, PT [facility]   4/11/2023  9:00 AM Florentin Susan, PT [facility]   4/13/2023  9:00 AM Florentin Susan, PT [facility]   4/18/2023  9:00 AM Florentin Susan, PT [facility]   4/26/2023  8:30 AM [facility] ULTRASOUND 1 [facility]   4/26/2023  9:00 AM Vinny Lightning Standard, APRN - CNP [facility]   7/6/2023  8:15 AM MD DEVEN VelazquezDE [facility] AM[facility]

## 2023-03-14 ENCOUNTER — HOSPITAL ENCOUNTER (OUTPATIENT)
Dept: PHYSICAL THERAPY | Age: 78
Setting detail: RECURRING SERIES
Discharge: HOME | End: 2023-03-17
Payer: MEDICARE

## 2023-03-14 PROCEDURE — 97140 MANUAL THERAPY 1/> REGIONS: CPT

## 2023-03-14 PROCEDURE — 97110 THERAPEUTIC EXERCISES: CPT

## 2023-03-14 ASSESSMENT — PAIN SCALES - GENERAL: PAINLEVEL_OUTOF10: 5

## 2023-03-14 NOTE — PROGRESS NOTES
Lolly Denis  : 1945  Primary: [facility] (Medicare Managed)  Secondary:  [address] @ [address] [place] [id number]  Phone: [phone]  Fax: [phone] Plan Frequency: 1 to 2 times a week for 8 weeks  Plan of Care/Certification Expiration Date: 23 (plan starting 23)      PT Visit Info:  Plan Frequency: 1 to 2 times a week for 8 weeks  Plan of Care/Certification Expiration Date: 23 (plan starting 23)      Visit Count:  7    OUTPATIENT PHYSICAL THERAPY:OP NOTE TYPE: Treatment Note 3/14/2023       Episode  }Appt Desk             Pain in Left Knee (M25.562)  Generalized Muscle Weakness (M62.81)  Complex tear of medial meniscus, current injury, left knee, subsequent encounter  Chondromalacia, left kneeComplex tear of medial meniscus, current injury, left knee, subsequent encounter [S83.232D]  Chondromalacia, left knee [B93.527]  Medical/Referring Diagnosis:  Complex tear of medial meniscus, current injury, left knee, subsequent encounter [S83.232D]  Chondromalacia, left knee [U05.965]  Referring Physician:  Tierra Alejo MD MD Orders:  PT Eval and Treat   Date of Onset:  Onset Date: 23 (Patient had arthroscopic surgical intervention performed on date listed on L knee.)     Allergies:   Patient has no known allergies. Restrictions/Precautions:  No data recorded  No data recorded   Interventions Planned (Treatment may consist of any combination of the following):    Current Treatment Recommendations: Strengthening; ROM; Balance training; Functional mobility training; Transfer training; Endurance training; Gait training; Stair training; Neuromuscular re-education; Manual; Home exercise program; Modalities; Positioning; Dry needling; Therapeutic activities     Subjective Comments:  Patient saw Dr. Tom Rodriguez yesterday and is a little upset that most of his pain is due to arthritis and may need a knee replacement. He states he would like to avoid replacement as long as possible  Initial:}    5/10Post Session:       3/10  Medications Last Reviewed:  3/14/2023  Updated Objective Findings:   Improving with exercise tolerance    Treatment   THERAPEUTIC EXERCISE: (40 minutes):    Exercises per grid below to improve mobility, strength, balance, and coordination.  Required minimal visual, verbal, manual, and tactile cues to promote proper body alignment, promote proper body posture, promote proper body mechanics, and promote proper body breathing techniques.  Progressed resistance, range, repetitions, and complexity of movement as indicated.   Date:  3/14/2023 Date:  3/7/2023 Date:  3/9/2023   Activity/Exercise Parameters Parameters Parameters   Heel Prop Foam roll, with heat, x 8 minutes Foam roll, with heat, x 8 minutes Foam roll with heat, 8 minutes   Quad Set 20 x 5 seconds 20 x 5 seconds 20 x 5 seconds   Leg Raise Supine, quad set, 3 x 10 Supine, quad set, 2 x 10 Supine, quad set, 2 x 10   Hamstring curl Seated, red, 2 x 10     SAQ --- --- 2 x 10   Hamstring stretch Supine, strap, 3 x 30 seconds Supine, strap, 3 x 30 seconds ---   Calf stretch Slant board, 3 x 30 seconds Slant board, 3 x 30 seconds Slant board, 3 x 30 seconds    Heel Slide Active 2 x 10 --- Active, 2 x 10   Hip Abduction Standing, 2 lbs, 2 x 10 each   --- Sidelying, 2 x 10    Standing, 2 x 10 each   Bridging 2 x 10     Nu step Level 3, x 10 minutes Level 2, x 10 minutes Level 3, x 10 minutes   Long arc quads Plinth, 2 lbs, 2 x 10 Plinth, 2 x 10 Plinth, 3 x 10   Side stepping  Forward/ backwards 2 x 15 ft  Side steps 2 x 15 ft each --- 2 x 10 ft each   Sit to stand Plinth, 3 x 10 Plinth, 2 x 10 Plinth, 3 x 10   Calf raises 2 lbs, 2 x 10 --- 2 x 10   Flexion & extension  --- Supine, feet on ball, 2 x 10 ---   Trunk rotation  ---  ---                   Time spent with patient reviewing proper muscle recruitment and technique with exercises.     MANUAL THERAPY: (13  minutes):   Joint mobilization and Soft tissue mobilization was utilized and necessary because of the patient's restricted joint motion and painful spasm. Grade 2-3 patellofemoral mobilization with knee extended   Soft tissue mobilizations with and without wooden tool to anterior knee and port holes to decrease tightness and adhesions    MODALITIES: (8 minutes): *  Hot Pack Therapy in order to provide analgesia and relieve muscle spasm. With heel prop exercise    HEP: As above; handouts given to patient for all exercises. Treatment/Session Summary:    Treatment Assessment:  Patient tolerating increased standing exercises well, but does report increased knee pain at times. Communication/Consultation:   Reviewed HEP  Equipment provided today:  None  Recommendations/Intent for next treatment session: Next visit will focus on participation to improve activity tolerance and ability to navigate environment.     Total Treatment Billable Duration:  53 minutes   Time In: 4767  Time Out: 2578 Davied December, PT       Charge Capture  }Post Session Pain  PT Visit Info  [employer] [facility]  MD Guidelines  Scanned Media  Benefits  MyChart    Future Appointments   Date Time Provider Dhiraj Walker   3/16/2023  9:00 AM Davied December, PT [facility]   3/21/2023  9:00 AM Davied December, PT [facility]   3/23/2023  9:00 AM Davied December, PT [facility]   3/28/2023  9:00 AM Davied December, PT [facility]   3/30/2023  9:00 AM Davied December, PT [facility]   4/4/2023  9:00 AM Davied December, PT [facility]   4/6/2023  9:00 AM Davied December, PT [facility]   4/11/2023  9:00 AM Davied December, PT [facility]   4/13/2023  9:00 AM Davied December, PT [facility]   4/18/2023  9:00 AM Davied December, PT [facility]   4/26/2023  8:30 AM [facility]TRASOUND 1 [facility]   4/26/2023  9:00 AM Antoni Coffman, APRN - CNP [facility]   7/6/2023  8:15 AM MD ASHLEIGH Mckinley [facility]

## 2023-03-16 ENCOUNTER — HOSPITAL ENCOUNTER (OUTPATIENT)
Dept: PHYSICAL THERAPY | Age: 78
Setting detail: RECURRING SERIES
Discharge: HOME | End: 2023-03-19
Payer: MEDICARE

## 2023-03-16 PROCEDURE — 97110 THERAPEUTIC EXERCISES: CPT

## 2023-03-16 PROCEDURE — 97140 MANUAL THERAPY 1/> REGIONS: CPT

## 2023-03-16 ASSESSMENT — PAIN SCALES - GENERAL: PAINLEVEL_OUTOF10: 5

## 2023-03-16 NOTE — PROGRESS NOTES
Obdulio Moore  : 1945  Primary: Tyrel [facility] (Medicare Managed)  Secondary:  [address] @ [address] [place], [place] [address]  Phone: [phone]  Fax: [phone] Plan Frequency: 1 to 2 times a week for 8 weeks  Plan of Care/Certification Expiration Date: 23 (plan starting 23)      PT Visit Info:  Plan Frequency: 1 to 2 times a week for 8 weeks  Plan of Care/Certification Expiration Date: 23 (plan starting 23)      Visit Count:  8    OUTPATIENT PHYSICAL THERAPY:OP NOTE TYPE: Treatment Note 3/16/2023       Episode  }Appt Desk             Pain in Left Knee (M25.562)  Generalized Muscle Weakness (M62.81)  Complex tear of medial meniscus, current injury, left knee, subsequent encounter  Chondromalacia, left kneeComplex tear of medial meniscus, current injury, left knee, subsequent encounter [S83.232D]  Chondromalacia, left knee [U60.707]  Medical/Referring Diagnosis:  Complex tear of medial meniscus, current injury, left knee, subsequent encounter [S83.232D]  Chondromalacia, left knee [I07.136]  Referring Physician:  Chao Bey MD MD Orders:  PT Eval and Treat   Date of Onset:  Onset Date: 23 (Patient had arthroscopic surgical intervention performed on date listed on L knee.)     Allergies:   Patient has no known allergies. Restrictions/Precautions:  No data recorded  No data recorded   Interventions Planned (Treatment may consist of any combination of the following):    Current Treatment Recommendations: Strengthening; ROM; Balance training; Functional mobility training; Transfer training; Endurance training; Gait training; Stair training; Neuromuscular re-education; Manual; Home exercise program; Modalities; Positioning; Dry needling; Therapeutic activities     Subjective Comments:  Patient reports continued pain in his L knee which he knows is arthritis.  States he feels better after therapy sessions for a

## 2023-03-22 ENCOUNTER — HOSPITAL ENCOUNTER (OUTPATIENT)
Dept: PHYSICAL THERAPY | Age: 78
Setting detail: RECURRING SERIES
Discharge: HOME | End: 2023-03-25
Payer: MEDICARE

## 2023-03-22 PROCEDURE — 97110 THERAPEUTIC EXERCISES: CPT

## 2023-03-22 PROCEDURE — 97140 MANUAL THERAPY 1/> REGIONS: CPT

## 2023-03-22 ASSESSMENT — PAIN SCALES - GENERAL: PAINLEVEL_OUTOF10: 3

## 2023-03-22 NOTE — THERAPY EVALUATION
continues to be required due to decreased activity tolerance, increased pain, decreased ROM, and decreased strength. Reason For Services/Other Comments:  > Patient continues to require skilled intervention due to increasing complexity of exercises within graded exercise program.  Total Duration:  Time In: 1005  Time Out: [address] [facility]'s therapy, I certify that the treatment plan above will be carried out by a therapist or under their direction. Thank you for this referral,  Eric Canales, PT     Referring Physician Signature: Mitra Boudreaux MD No Signature is Required for this note.         Post Session Pain  Charge Capture  PT Visit Info MD Guidelines  MyChart

## 2023-03-23 ENCOUNTER — HOSPITAL ENCOUNTER (OUTPATIENT)
Dept: PHYSICAL THERAPY | Age: 78
Setting detail: RECURRING SERIES
Discharge: HOME | End: 2023-03-26
Payer: MEDICARE

## 2023-03-23 PROCEDURE — 97110 THERAPEUTIC EXERCISES: CPT

## 2023-03-23 PROCEDURE — 97140 MANUAL THERAPY 1/> REGIONS: CPT

## 2023-03-23 ASSESSMENT — PAIN SCALES - GENERAL: PAINLEVEL_OUTOF10: 3

## 2023-03-23 NOTE — PROGRESS NOTES
[facility]  : 1945  Primary: Lillie [facility] (Medicare Managed)  Secondary:  [address] @ [address] [place] [id number]  Phone: [phone]  Fax: [phone] Plan Frequency: 1 to 2 times a week for 8 weeks  Plan of Care/Certification Expiration Date: 23 (plan starting 23)      PT Visit Info:  Plan Frequency: 1 to 2 times a week for 8 weeks  Plan of Care/Certification Expiration Date: 23 (plan starting 23)      Visit Count:  10    OUTPATIENT PHYSICAL THERAPY:OP NOTE TYPE: Treatment Note 3/23/2023       Episode  }Appt Desk             Pain in Left Knee (M25.562)  Generalized Muscle Weakness (M62.81)  Complex tear of medial meniscus, current injury, left knee, subsequent encounter  Chondromalacia, left kneeComplex tear of medial meniscus, current injury, left knee, subsequent encounter [S83.232D]  Chondromalacia, left knee [W56.575]  Medical/Referring Diagnosis:  Complex tear of medial meniscus, current injury, left knee, subsequent encounter [S83.232D]  Chondromalacia, left knee [X77.260]  Referring Physician:  Milan Aragon MD MD Orders:  PT Eval and Treat   Date of Onset:  Onset Date: 23 (Patient had arthroscopic surgical intervention performed on date listed on L knee.)     Allergies:   Patient has no known allergies. Restrictions/Precautions:  No data recorded  No data recorded   Interventions Planned (Treatment may consist of any combination of the following):    Current Treatment Recommendations: Strengthening; ROM; Balance training; Functional mobility training; Transfer training; Endurance training; Gait training; Stair training; Neuromuscular re-education; Manual; Home exercise program; Modalities; Positioning; Dry needling; Therapeutic activities     Subjective Comments:  Patient reports being sore since the weekend from getting in and out of the car more, but feeling some better now.   Initial:}

## 2023-03-28 ENCOUNTER — HOSPITAL ENCOUNTER (OUTPATIENT)
Dept: PHYSICAL THERAPY | Age: 78
Setting detail: RECURRING SERIES
Discharge: HOME | End: 2023-03-31
Payer: MEDICARE

## 2023-03-28 PROCEDURE — 97140 MANUAL THERAPY 1/> REGIONS: CPT

## 2023-03-28 PROCEDURE — 97110 THERAPEUTIC EXERCISES: CPT

## 2023-03-28 ASSESSMENT — PAIN SCALES - GENERAL: PAINLEVEL_OUTOF10: 5

## 2023-03-28 NOTE — PROGRESS NOTES
Ellie Weiss  : 1945  Primary: [facility] (Medicare Managed)  Secondary:  [facility] @ [address] [place] [id number]  Phone: [phone]  Fax: [phone] Plan Frequency: 1 to 2 times a week for 8 weeks  Plan of Care/Certification Expiration Date: 23 (plan starting 23)      PT Visit Info:  Plan Frequency: 1 to 2 times a week for 8 weeks  Plan of Care/Certification Expiration Date: 23 (plan starting 23)      Visit Count:  11    OUTPATIENT PHYSICAL THERAPY:OP NOTE TYPE: Treatment Note 3/28/2023       Episode  }Appt Desk             Pain in Left Knee (M25.562)  Generalized Muscle Weakness (M62.81)  Complex tear of medial meniscus, current injury, left knee, subsequent encounter  Chondromalacia, left kneeComplex tear of medial meniscus, current injury, left knee, subsequent encounter [S83.232D]  Chondromalacia, left knee [Q50.062]  Medical/Referring Diagnosis:  Complex tear of medial meniscus, current injury, left knee, subsequent encounter [S83.232D]  Chondromalacia, left knee [E95.651]  Referring Physician:  Clovis Wilson MD MD Orders:  PT Eval and Treat   Date of Onset:  Onset Date: 23 (Patient had arthroscopic surgical intervention performed on date listed on L knee.)     Allergies:   Patient has no known allergies. Restrictions/Precautions:  No data recorded  No data recorded   Interventions Planned (Treatment may consist of any combination of the following):    Current Treatment Recommendations: Strengthening; ROM; Balance training; Functional mobility training; Transfer training; Endurance training; Gait training; Stair training; Neuromuscular re-education; Manual; Home exercise program; Modalities; Positioning; Dry needling; Therapeutic activities     Subjective Comments:  Patient reports difficulty getting in and out of his truck.   Initial:}    5 (gross anterior L knee)/10Post Session:

## 2023-03-30 ENCOUNTER — HOSPITAL ENCOUNTER (OUTPATIENT)
Dept: PHYSICAL THERAPY | Age: 78
Setting detail: RECURRING SERIES
End: 2023-03-30
Payer: MEDICARE

## 2023-03-30 NOTE — PROGRESS NOTES
[address] at [facility] 3/30/2023      Patient canceled appointment today.        Luz Christianson, PT, DPT, OMT-C

## 2023-04-04 ENCOUNTER — APPOINTMENT (OUTPATIENT)
Dept: PHYSICAL THERAPY | Age: 78
End: 2023-04-04
Payer: MEDICARE

## 2023-04-06 ENCOUNTER — HOSPITAL ENCOUNTER (OUTPATIENT)
Dept: PHYSICAL THERAPY | Age: 78
Setting detail: RECURRING SERIES
End: 2023-04-06
Payer: MEDICARE

## 2023-04-13 ENCOUNTER — HOSPITAL ENCOUNTER (OUTPATIENT)
Dept: PHYSICAL THERAPY | Age: 78
Setting detail: RECURRING SERIES
Discharge: HOME | End: 2023-04-16
Payer: MEDICARE

## 2023-04-13 PROCEDURE — 97140 MANUAL THERAPY 1/> REGIONS: CPT

## 2023-04-13 PROCEDURE — 97110 THERAPEUTIC EXERCISES: CPT

## 2023-04-13 ASSESSMENT — PAIN SCALES - GENERAL: PAINLEVEL_OUTOF10: 4

## 2023-04-18 ENCOUNTER — HOSPITAL ENCOUNTER (OUTPATIENT)
Dept: PHYSICAL THERAPY | Age: 78
Setting detail: RECURRING SERIES
Discharge: HOME | End: 2023-04-21
Payer: MEDICARE

## 2023-04-18 PROCEDURE — 97140 MANUAL THERAPY 1/> REGIONS: CPT

## 2023-04-18 PROCEDURE — 97110 THERAPEUTIC EXERCISES: CPT

## 2023-04-18 ASSESSMENT — PAIN SCALES - GENERAL: PAINLEVEL_OUTOF10: 4

## 2023-04-18 NOTE — THERAPY RECERTIFICATION
represents no disability. Minimal detectable change is 9 points. Medical Necessity:   > Patient is expected to demonstrate progress in strength, range of motion, balance, coordination, and functional technique to improve ability to perform ADLs as well as navigate home and community environment.  > Skilled intervention continues to be required due to decreased activity tolerance, increased pain, decreased ROM, and decreased strength. Reason For Services/Other Comments:  > Patient continues to require skilled intervention due to increasing complexity of exercises within graded exercise program.  Total Duration:  Time In: 0902  Time Out: [address] [facility]'s therapy, I certify that the treatment plan above will be carried out by a therapist or under their direction. Thank you for this referral,  Julia Andrade, PT     Referring Physician Signature: Brittany Calero MD No Signature is Required for this note.         Post Session Pain  Charge Capture  PT Visit Info MD Guidelines  Syed

## 2023-04-18 NOTE — PROGRESS NOTES
patient's restricted joint motion and painful spasm. Grade 2-3 patellofemoral mobilization with knee extended   Soft tissue mobilizations with and without wooden tool to anterior knee and port holes to decrease tightness and adhesions    MODALITIES: ( minutes): *  Hot Pack Therapy in order to provide analgesia and relieve muscle spasm. With heel prop exercise- NOT TODAY    HEP: As above; handouts given to patient for all exercises. Treatment/Session Summary:    Treatment Assessment:  Patient needs to continue working on strength, balance, and overall safety at home. Plan to progress standing and functional activiites as well as add in obstacle course to challenge balance. Communication/Consultation:   Reviewed HEP  Equipment provided today:  None  Recommendations/Intent for next treatment session: Next visit will focus on participation to improve activity tolerance and ability to navigate environment.     Total Treatment Billable Duration:  55 minutes   Time In: 0902  Time Out: 0336    Trixie Leos, PT       Charge Capture  }Post Session Pain  PT Visit Info  [facility] Portal  MD Guidelines  Scanned Media  Benefits  MyChart    Future Appointments   Date Time Provider Dhiraj Walker   4/20/2023  9:00 AM Candy Fonder, PT [facility]   4/25/2023 10:00 AM Charls Fear, PTA [facility]   4/26/2023  8:30 AM [facility] ULTRASOUND 1 [facility]   4/26/2023  9:00 AM Ladene Riedel Standard, APRN - CNP [facility]   4/28/2023 10:00 AM Charls Fear, PTA [facility]   5/1/2023  9:00 AM Charls Fear, PTA [facility]   5/5/2023  9:00 AM Candy Fonder, PT [facility]   5/9/2023  9:00 AM Charls Fear, PTA [facility]   5/12/2023  9:00 AM Candy Fonder, PT [facility]   5/15/2023 10:00 AM Candy Fonder, PT [facility]   5/19/2023  9:00 AM Candy Fonder, PT [facility]   7/6/2023  8:15 AM MD ASHLEIGH Cm [facility]

## 2023-04-20 ENCOUNTER — TELEPHONE (OUTPATIENT)
Dept: CARDIOLOGY CLINIC | Age: 78
End: 2023-04-20

## 2023-04-20 ENCOUNTER — HOSPITAL ENCOUNTER (OUTPATIENT)
Dept: PHYSICAL THERAPY | Age: 78
Setting detail: RECURRING SERIES
Discharge: HOME | End: 2023-04-23
Payer: MEDICARE

## 2023-04-20 PROCEDURE — 97110 THERAPEUTIC EXERCISES: CPT

## 2023-04-20 PROCEDURE — 97140 MANUAL THERAPY 1/> REGIONS: CPT

## 2023-04-20 ASSESSMENT — PAIN SCALES - GENERAL: PAINLEVEL_OUTOF10: 4

## 2023-04-24 NOTE — TELEPHONE ENCOUNTER
Brielle Peterson MD  You 7 hours ago (9:06 AM)     CS  No cardiac problem with that      Informed patient of Dr Michelle Gentile response.

## 2023-04-25 ENCOUNTER — HOSPITAL ENCOUNTER (OUTPATIENT)
Dept: PHYSICAL THERAPY | Age: 78
Setting detail: RECURRING SERIES
Discharge: HOME | End: 2023-04-28
Payer: MEDICARE

## 2023-04-25 PROCEDURE — 97140 MANUAL THERAPY 1/> REGIONS: CPT

## 2023-04-25 PROCEDURE — 97110 THERAPEUTIC EXERCISES: CPT

## 2023-04-25 ASSESSMENT — PAIN SCALES - GENERAL: PAINLEVEL_OUTOF10: 4

## 2023-04-25 NOTE — PROGRESS NOTES
4/25/2023  Updated Objective Findings:   Pt. Ambulated better after session today and reported less pain    Treatment   THERAPEUTIC EXERCISE: (40 minutes):    Exercises per grid below to improve mobility, strength, balance, and coordination. Required minimal visual, verbal, manual, and tactile cues to promote proper body alignment, promote proper body posture, promote proper body mechanics, and promote proper body breathing techniques. Progressed resistance, range, repetitions, and complexity of movement as indicated. Date:  4/25/2023 Date:  4/17/2023 Date:  4/20/2023   Activity/Exercise Parameters Parameters Parameters   Heel Prop Foam roll, with heat, x 8 minutes Foam roll, with manual Foam roll with heat, 10 minutes   Quad Set Seated, 10 x 10 seconds --- ---   Leg Raise --- --- ---   Hamstring curl Standing, 3 lbs, 2 x 10 --- Seated, green, 2 x 10   Hamstring stretch --- --- ---   Calf stretch Slant board, 3 x 30 seconds Slant board, 4 x 30 seconds Slant board, 3 x 30 seconds   Heel Slide --- --- ---   Hip Abduction Standing, 3 lbs, 2 x 10 each   Standing, 2 x 15 each Standing, 3 lbs, 2 x 10 each   Bridging --- --- ---   Nu step Level 4, x 10 minutes for range of motion and endurance Level 3, x 11 minutes for ROM and endurance Level 3, x 10 minutes for ROM and endurance   Long arc quads --- --- 3 lbs, 2 x 10   Walking Forward/ backwards 2 x 20 ft each    Side stepping 2 x 20 ft each    Tandem walking, 2 x 20 ft Forward/ backwards 2 x 20 ft each    Side stepping 2 x 20 ft each    Tandem walking, 2 x 20 ft Forward/ backwards 2 x 20 ft each    Side stepping 2 x 20 ft each   Sit to stand Plinth, 3 x 10 Black chair, 2 x 10 Black chair, 3 x 10   Calf raises 3 lbs, 2 x 10 2 x 15 3 lbs, 2 x 10   Step ups --- 4 inch, forward and lateral, x 10 each ---             Time spent with patient reviewing proper muscle recruitment and technique with exercises.      MANUAL THERAPY: (15 minutes):   Joint mobilization and Soft

## 2023-04-26 ENCOUNTER — OFFICE VISIT (OUTPATIENT)
Dept: VASCULAR SURGERY | Age: 78
End: 2023-04-26
Payer: MEDICARE

## 2023-04-26 VITALS
HEART RATE: 63 BPM | SYSTOLIC BLOOD PRESSURE: 132 MMHG | HEIGHT: 68 IN | OXYGEN SATURATION: 96 % | WEIGHT: 161 LBS | DIASTOLIC BLOOD PRESSURE: 77 MMHG | BODY MASS INDEX: 24.4 KG/M2

## 2023-04-26 DIAGNOSIS — Z98.890 H/O CAROTID ENDARTERECTOMY: Primary | ICD-10-CM

## 2023-04-26 DIAGNOSIS — I65.22 LEFT CAROTID ARTERY STENOSIS: ICD-10-CM

## 2023-04-26 PROCEDURE — G8427 DOCREV CUR MEDS BY ELIG CLIN: HCPCS | Performed by: NURSE PRACTITIONER

## 2023-04-26 PROCEDURE — 99213 OFFICE O/P EST LOW 20 MIN: CPT | Performed by: NURSE PRACTITIONER

## 2023-04-26 PROCEDURE — 3074F SYST BP LT 130 MM HG: CPT | Performed by: NURSE PRACTITIONER

## 2023-04-26 PROCEDURE — G8420 CALC BMI NORM PARAMETERS: HCPCS | Performed by: NURSE PRACTITIONER

## 2023-04-26 PROCEDURE — 1036F TOBACCO NON-USER: CPT | Performed by: NURSE PRACTITIONER

## 2023-04-26 PROCEDURE — 1123F ACP DISCUSS/DSCN MKR DOCD: CPT | Performed by: NURSE PRACTITIONER

## 2023-04-26 PROCEDURE — 3078F DIAST BP <80 MM HG: CPT | Performed by: NURSE PRACTITIONER

## 2023-04-26 NOTE — PROGRESS NOTES
DATE OF VISIT: 2023      JOSLYN Moreno is a 69 y.o. male who follows up for his 6-month carotid duplex study. He denies any new lateralizing neurological symptoms. He remains on Eliquis, ASA, Crestor and Tambocor. He has undergone a left carotid endarterectomy. MEDICAL HISTORY:   Past Medical History:   Diagnosis Date    Acute maxillary sinusitis     Acute pharyngitis     Aftercare for long-term (current) use of antiplatelets/antithrombotics 2016    Allergic rhinitis     Anemia 2016    Backache     CAD (coronary artery disease) 2016    Carotid artery disease ([facility] 2016    Chronic atrial fibrillation (HCC)     Decreased libido     Diabetes ([facility] [address] 2016    type 2, does not check blood sugar at home    Dizziness and giddiness     ED (erectile dysfunction) 2016    Encounter for monitoring testosterone replacement therapy 2016    Fatigue     Fever blister     GERD (gastroesophageal reflux disease)     controlled with nexium    Glaucoma 2016    Hearing difficulty of right ear 2016    High cholesterol     High risk medication use 2016    Hypertension 2016    managed with meds    Hyponatremia 2016    IBS (irritable bowel syndrome) 2016    Intertrigo 2016    Irregular heart beat 2016    Left shoulder pain     Mouth pain     Obstructive sleep apnea of adult 2016    Osteoporosis 2016    Plantar wart of left foot 2016    Rotator cuff tear     Suspected Diagnosis    Sinus bradycardia 2016    Sinusitis     Skin infection     Sore throat     Special screening for malignant neoplasm of prostate     Stroke ([facility] [address] 2021    slurred speech and left sided facial droop, resolved    Subclinical hyperthyroidism     Tinea corporis          SURGICAL HISTORY:   Past Surgical History:   Procedure Laterality Date    COLONOSCOPY      UROLOGICAL SURGERY      age 17,--urethra narrowing. Tanda Bun Haresh Hale Had bladder

## 2023-04-28 ENCOUNTER — HOSPITAL ENCOUNTER (OUTPATIENT)
Dept: PHYSICAL THERAPY | Age: 78
Setting detail: RECURRING SERIES
Discharge: HOME | End: 2023-05-01
Payer: MEDICARE

## 2023-04-28 PROCEDURE — 97110 THERAPEUTIC EXERCISES: CPT

## 2023-04-28 PROCEDURE — 97140 MANUAL THERAPY 1/> REGIONS: CPT

## 2023-04-28 ASSESSMENT — PAIN SCALES - GENERAL: PAINLEVEL_OUTOF10: 3

## 2023-06-09 ENCOUNTER — TELEPHONE (OUTPATIENT)
Age: 78
End: 2023-06-09

## 2023-06-09 NOTE — TELEPHONE ENCOUNTER
Will from [facility] called back and said they need instructions from Dr. Rhaul belle for this patient. Please check status since it is nearing the end of the day.

## 2023-06-09 NOTE — TELEPHONE ENCOUNTER
Per :Moderate Cardiovascular risk for procedure. May hold Eliquis 2 days prior and resume ASAP post at surgeons discretion. LVM at [facility] w/'s response. Faxed copy of this note to [facility] at [phone].

## 2023-06-09 NOTE — TELEPHONE ENCOUNTER
Physician or Practice Requesting:Dr Pelletier  : Will   Contact Phone Number: [phone]  Fax Number: [phone]  Date of Surgery/Procedure: 06/12  Type of Surgery or Procedure: Space oar   Type of Anesthesia: monitored   Type of Clearance Requested: Cardiac Clearance and Medication Hold  Medication to Hold:Eliquis   Days to Hold: 2 days         NEEDS TO KNOW TODAY     Pt.has hx of CVA and PAF

## 2023-06-09 NOTE — TELEPHONE ENCOUNTER
Cardiac Clearance        Physician or Practice Requesting:Dr Pelletier  : Will   Contact Phone Number: [phone]  Fax Number: [phone]  Date of Surgery/Procedure: 06/12  Type of Surgery or Procedure: Space oar   Type of Anesthesia: monitored   Type of Clearance Requested: Cardiac Clearance and Medication Hold  Medication to Hold:Eliquis   Days to Hold: 2 days       NEEDS TO KNOW TODAY

## 2023-07-06 ENCOUNTER — OFFICE VISIT (OUTPATIENT)
Age: 78
End: 2023-07-06
Payer: MEDICARE

## 2023-07-06 VITALS
DIASTOLIC BLOOD PRESSURE: 82 MMHG | BODY MASS INDEX: 23.49 KG/M2 | WEIGHT: 155 LBS | SYSTOLIC BLOOD PRESSURE: 138 MMHG | HEIGHT: 68 IN

## 2023-07-06 DIAGNOSIS — I25.10 CORONARY ARTERY DISEASE INVOLVING NATIVE CORONARY ARTERY OF NATIVE HEART WITHOUT ANGINA PECTORIS: Primary | ICD-10-CM

## 2023-07-06 DIAGNOSIS — E78.5 DYSLIPIDEMIA: ICD-10-CM

## 2023-07-06 DIAGNOSIS — I10 PRIMARY HYPERTENSION: ICD-10-CM

## 2023-07-06 DIAGNOSIS — I48.0 PAROXYSMAL ATRIAL FIBRILLATION (HCC): ICD-10-CM

## 2023-07-06 PROCEDURE — G8428 CUR MEDS NOT DOCUMENT: HCPCS | Performed by: INTERNAL MEDICINE

## 2023-07-06 PROCEDURE — 99214 OFFICE O/P EST MOD 30 MIN: CPT | Performed by: INTERNAL MEDICINE

## 2023-07-06 PROCEDURE — 1036F TOBACCO NON-USER: CPT | Performed by: INTERNAL MEDICINE

## 2023-07-06 PROCEDURE — 1123F ACP DISCUSS/DSCN MKR DOCD: CPT | Performed by: INTERNAL MEDICINE

## 2023-07-06 PROCEDURE — 3075F SYST BP GE 130 - 139MM HG: CPT | Performed by: INTERNAL MEDICINE

## 2023-07-06 PROCEDURE — 3079F DIAST BP 80-89 MM HG: CPT | Performed by: INTERNAL MEDICINE

## 2023-07-06 PROCEDURE — G8420 CALC BMI NORM PARAMETERS: HCPCS | Performed by: INTERNAL MEDICINE

## 2023-07-06 NOTE — PROGRESS NOTES
[address] [facility]  [address] [place] [address]  PHONE: [phone]      23    NAME:  Amado Madison  : 1945  MRN: [id number]       SUBJECTIVE:   Amado Madison is a 69 y.o. male seen for a follow up visit regarding the following:     Chief Complaint   Patient presents with    Coronary Artery Disease         HPI:    No cp or goss. No orthopnea or pnd. No palpitations or syncope. Undergoing radiation for prostate cancer. Past Medical History, Past Surgical History, Family history, Social History, and Medications were all reviewed with the patient today and updated as necessary. Current Outpatient Medications   Medication Sig Dispense Refill    aspirin 81 MG EC tablet Take 1 tablet by mouth daily      losartan (COZAAR) 100 MG tablet Take 1 tablet by mouth daily 90 tablet 3    amLODIPine (NORVASC) 5 MG tablet Take 1 tablet by mouth daily      apixaban (ELIQUIS) 5 MG TABS tablet Take 1 tablet by mouth 2 times daily (Patient taking differently: Take 1 tablet by mouth 2 times daily 4 boxes of 2.5 mg Eliquis given out in office today. Lot.  [id number] Exp: 2023.) 180 tablet 3    acetaminophen (TYLENOL) 500 MG tablet Take 2 tablets by mouth every 6 hours as needed      calcium citrate-vitamin D (CITRICAL + D) 315-250 MG-UNIT TABS per tablet One am(with centrum) and two pm Indications: OSTEOPOROSIS      flecainide (TAMBOCOR) 100 MG tablet Take 0.5 tablets by mouth 2 times daily      fluticasone (FLONASE) 50 MCG/ACT nasal spray 1 spray by Nasal route daily      latanoprost (XALATAN) 0.005 % ophthalmic solution USE 1 DROP IN BOTH EYES EVERY EVENING      magnesium oxide (MAG-OX) 400 MG tablet Take 1 tablet by mouth 2 times daily      metFORMIN (GLUCOPHAGE) 500 MG tablet Take 1 tablet by mouth daily (with breakfast)      rosuvastatin (CRESTOR) 40 MG tablet Take 1 tablet by mouth 1/2 tab      timolol (TIMOPTIC) 0.25 % ophthalmic solution Apply 1 drop to eye daily

## 2024-04-25 ENCOUNTER — OFFICE VISIT (OUTPATIENT)
Dept: VASCULAR SURGERY | Age: 79
End: 2024-04-25
Payer: MEDICARE

## 2024-04-25 VITALS
HEART RATE: 72 BPM | DIASTOLIC BLOOD PRESSURE: 86 MMHG | HEIGHT: 68 IN | BODY MASS INDEX: 24.55 KG/M2 | SYSTOLIC BLOOD PRESSURE: 152 MMHG | OXYGEN SATURATION: 94 % | WEIGHT: 162 LBS

## 2024-04-25 DIAGNOSIS — Z98.890 H/O CAROTID ENDARTERECTOMY: ICD-10-CM

## 2024-04-25 DIAGNOSIS — I65.22 LEFT CAROTID ARTERY STENOSIS: Primary | ICD-10-CM

## 2024-04-25 PROCEDURE — G8420 CALC BMI NORM PARAMETERS: HCPCS | Performed by: NURSE PRACTITIONER

## 2024-04-25 PROCEDURE — 3078F DIAST BP <80 MM HG: CPT | Performed by: NURSE PRACTITIONER

## 2024-04-25 PROCEDURE — G8427 DOCREV CUR MEDS BY ELIG CLIN: HCPCS | Performed by: NURSE PRACTITIONER

## 2024-04-25 PROCEDURE — 99213 OFFICE O/P EST LOW 20 MIN: CPT | Performed by: NURSE PRACTITIONER

## 2024-04-25 PROCEDURE — 1036F TOBACCO NON-USER: CPT | Performed by: NURSE PRACTITIONER

## 2024-04-25 PROCEDURE — 1123F ACP DISCUSS/DSCN MKR DOCD: CPT | Performed by: NURSE PRACTITIONER

## 2024-04-25 PROCEDURE — 3075F SYST BP GE 130 - 139MM HG: CPT | Performed by: NURSE PRACTITIONER

## 2024-04-25 NOTE — PROGRESS NOTES
DATE OF VISIT: 4/25/2024      HPI  Beto Nicholas is a 78 y.o. male who follows up for his yearly carotid duplex study.  He denies any new lateralizing neurological symptoms.  He remains on Eliquis, ASA, Crestor and Tambocor.  He has undergone a left carotid endarterectomy.         MEDICAL HISTORY:   Past Medical History:   Diagnosis Date    Acute maxillary sinusitis     Acute pharyngitis     Aftercare for long-term (current) use of antiplatelets/antithrombotics 9/29/2016    Allergic rhinitis     Anemia 9/29/2016    Backache     CAD (coronary artery disease) 9/29/2016    Carotid artery disease (HCC) 9/29/2016    Chronic atrial fibrillation (HCC)     Decreased libido     Diabetes (HCC) 9/29/2016    type 2, does not check blood sugar at home    Dizziness and giddiness     ED (erectile dysfunction) 9/29/2016    Encounter for monitoring testosterone replacement therapy 9/29/2016    Fatigue     Fever blister     GERD (gastroesophageal reflux disease)     controlled with nexium    Glaucoma 9/29/2016    Hearing difficulty of right ear 9/29/2016    High cholesterol     High risk medication use 9/29/2016    Hypertension 9/29/2016    managed with meds    Hyponatremia 9/29/2016    IBS (irritable bowel syndrome) 9/29/2016    Intertrigo 9/29/2016    Irregular heart beat 09/29/2016    Left shoulder pain     Mouth pain     Obstructive sleep apnea of adult 9/29/2016    Osteoporosis 9/29/2016    Plantar wart of left foot 9/29/2016    Rotator cuff tear     Suspected Diagnosis    Sinus bradycardia 9/29/2016    Sinusitis     Skin infection     Sore throat     Special screening for malignant neoplasm of prostate     Stroke (HCC) 09/01/2021    slurred speech and left sided facial droop, resolved    Subclinical hyperthyroidism     Tinea corporis          SURGICAL HISTORY:   Past Surgical History:   Procedure Laterality Date    COLONOSCOPY      UROLOGICAL SURGERY      age 17,--urethra narrowing...Had bladder

## 2024-06-18 ENCOUNTER — OFFICE VISIT (OUTPATIENT)
Age: 79
End: 2024-06-18
Payer: MEDICARE

## 2024-06-18 VITALS
WEIGHT: 153 LBS | BODY MASS INDEX: 23.19 KG/M2 | HEIGHT: 68 IN | SYSTOLIC BLOOD PRESSURE: 110 MMHG | DIASTOLIC BLOOD PRESSURE: 70 MMHG | HEART RATE: 60 BPM

## 2024-06-18 DIAGNOSIS — I25.10 CORONARY ARTERY DISEASE INVOLVING NATIVE CORONARY ARTERY OF NATIVE HEART WITHOUT ANGINA PECTORIS: ICD-10-CM

## 2024-06-18 DIAGNOSIS — E78.5 DYSLIPIDEMIA: ICD-10-CM

## 2024-06-18 DIAGNOSIS — I48.0 PAROXYSMAL ATRIAL FIBRILLATION (HCC): Primary | ICD-10-CM

## 2024-06-18 DIAGNOSIS — I10 PRIMARY HYPERTENSION: ICD-10-CM

## 2024-06-18 PROCEDURE — 3074F SYST BP LT 130 MM HG: CPT | Performed by: INTERNAL MEDICINE

## 2024-06-18 PROCEDURE — 1123F ACP DISCUSS/DSCN MKR DOCD: CPT | Performed by: INTERNAL MEDICINE

## 2024-06-18 PROCEDURE — 1036F TOBACCO NON-USER: CPT | Performed by: INTERNAL MEDICINE

## 2024-06-18 PROCEDURE — 3078F DIAST BP <80 MM HG: CPT | Performed by: INTERNAL MEDICINE

## 2024-06-18 PROCEDURE — 99214 OFFICE O/P EST MOD 30 MIN: CPT | Performed by: INTERNAL MEDICINE

## 2024-06-18 PROCEDURE — 93000 ELECTROCARDIOGRAM COMPLETE: CPT | Performed by: INTERNAL MEDICINE

## 2024-06-18 PROCEDURE — G8420 CALC BMI NORM PARAMETERS: HCPCS | Performed by: INTERNAL MEDICINE

## 2024-06-18 PROCEDURE — G8428 CUR MEDS NOT DOCUMENT: HCPCS | Performed by: INTERNAL MEDICINE

## 2024-06-18 NOTE — PROGRESS NOTES
[facility] CARDIOLOGY  [address] [place] [address], [address]  [place] [address]  PHONE: [phone]      24    NAME:  Beto Nicholas  : 1945  MRN: [id number]       SUBJECTIVE:   Beto Nicholas is a 78 y.o. male seen for a follow up visit regarding the following:     Chief Complaint   Patient presents with    Hypertension         HPI:    No cp or goss. No orthopnea or pnd. No palpitations or syncope.  Increased falls- no loc.    Past Medical History, Past Surgical History, Family history, Social History, and Medications were all reviewed with the patient today and updated as necessary.     Current Outpatient Medications   Medication Sig Dispense Refill    aspirin 81 MG EC tablet Take 1 tablet by mouth daily      losartan (COZAAR) 100 MG tablet Take 1 tablet by mouth daily (Patient taking differently: Take 0.5 tablets by mouth daily) 90 tablet 3    amLODIPine (NORVASC) 5 MG tablet Take 1 tablet by mouth daily      apixaban (ELIQUIS) 5 MG TABS tablet Take 1 tablet by mouth 2 times daily (Patient taking differently: Take 1 tablet by mouth 2 times daily 4 boxes of 2.5 mg Eliquis given out in office today.  Lot. [id number] Exp: 2023.) 180 tablet 3    acetaminophen (TYLENOL) 500 MG tablet Take 2 tablets by mouth every 6 hours as needed      calcium citrate-vitamin D (CITRICAL + D) 315-250 MG-UNIT TABS per tablet One am(with centrum) and two pm Indications: OSTEOPOROSIS      flecainide (TAMBOCOR) 100 MG tablet Take 0.5 tablets by mouth 2 times daily      fluticasone (FLONASE) 50 MCG/ACT nasal spray 1 spray by Nasal route daily      latanoprost (XALATAN) 0.005 % ophthalmic solution USE 1 DROP IN BOTH EYES EVERY EVENING      magnesium oxide (MAG-OX) 400 MG tablet Take 1 tablet by mouth 2 times daily      rosuvastatin (CRESTOR) 40 MG tablet Take 1 tablet by mouth 1/2 tab      timolol (TIMOPTIC) 0.25 % ophthalmic solution Apply 1 drop to eye daily       No current facility-administered medications for

## 2024-12-17 ENCOUNTER — OFFICE VISIT (OUTPATIENT)
Age: 79
End: 2024-12-17
Payer: MEDICARE

## 2024-12-17 VITALS
HEART RATE: 60 BPM | HEIGHT: 68 IN | WEIGHT: 152 LBS | SYSTOLIC BLOOD PRESSURE: 110 MMHG | BODY MASS INDEX: 23.04 KG/M2 | DIASTOLIC BLOOD PRESSURE: 70 MMHG

## 2024-12-17 DIAGNOSIS — I10 PRIMARY HYPERTENSION: Primary | ICD-10-CM

## 2024-12-17 DIAGNOSIS — R00.2 PALPITATION: ICD-10-CM

## 2024-12-17 DIAGNOSIS — E78.00 PURE HYPERCHOLESTEROLEMIA: ICD-10-CM

## 2024-12-17 PROCEDURE — G8484 FLU IMMUNIZE NO ADMIN: HCPCS | Performed by: INTERNAL MEDICINE

## 2024-12-17 PROCEDURE — 3074F SYST BP LT 130 MM HG: CPT | Performed by: INTERNAL MEDICINE

## 2024-12-17 PROCEDURE — 3078F DIAST BP <80 MM HG: CPT | Performed by: INTERNAL MEDICINE

## 2024-12-17 PROCEDURE — 1126F AMNT PAIN NOTED NONE PRSNT: CPT | Performed by: INTERNAL MEDICINE

## 2024-12-17 PROCEDURE — G8427 DOCREV CUR MEDS BY ELIG CLIN: HCPCS | Performed by: INTERNAL MEDICINE

## 2024-12-17 PROCEDURE — 1159F MED LIST DOCD IN RCRD: CPT | Performed by: INTERNAL MEDICINE

## 2024-12-17 PROCEDURE — 1036F TOBACCO NON-USER: CPT | Performed by: INTERNAL MEDICINE

## 2024-12-17 PROCEDURE — G8420 CALC BMI NORM PARAMETERS: HCPCS | Performed by: INTERNAL MEDICINE

## 2024-12-17 PROCEDURE — 99214 OFFICE O/P EST MOD 30 MIN: CPT | Performed by: INTERNAL MEDICINE

## 2024-12-17 PROCEDURE — 1123F ACP DISCUSS/DSCN MKR DOCD: CPT | Performed by: INTERNAL MEDICINE

## 2024-12-17 NOTE — PROGRESS NOTES
[facility] CARDIOLOGY  [address] [place] [address], [address]  [place] [address]  PHONE: [phone]      24    NAME:  Beto Nicholas  : 1945  MRN: [id number]       SUBJECTIVE:   Beto Nicholas is a 79 y.o. male seen for a follow up visit regarding the following:     Chief Complaint   Patient presents with    Coronary Artery Disease    Atrial Fibrillation    Follow-up         HPI:    No cp or goss. No orthopnea or pnd. No palpitations or syncope.  Mild goss- pulm eval in progress.    Past Medical History, Past Surgical History, Family history, Social History, and Medications were all reviewed with the patient today and updated as necessary.     Current Outpatient Medications   Medication Sig Dispense Refill    aspirin 81 MG EC tablet Take 1 tablet by mouth daily      losartan (COZAAR) 100 MG tablet Take 1 tablet by mouth daily (Patient taking differently: Take 0.5 tablets by mouth daily) 90 tablet 3    amLODIPine (NORVASC) 5 MG tablet Take 1 tablet by mouth daily      apixaban (ELIQUIS) 5 MG TABS tablet Take 1 tablet by mouth 2 times daily (Patient taking differently: Take 1 tablet by mouth 2 times daily 4 boxes of 2.5 mg Eliquis given out in office today.  Lot. [id number] Exp: 2023.) 180 tablet 3    acetaminophen (TYLENOL) 500 MG tablet Take 2 tablets by mouth every 6 hours as needed      calcium citrate-vitamin D (CITRICAL + D) 315-250 MG-UNIT TABS per tablet One am(with centrum) and two pm Indications: OSTEOPOROSIS      flecainide (TAMBOCOR) 100 MG tablet Take 0.5 tablets by mouth 2 times daily      fluticasone (FLONASE) 50 MCG/ACT nasal spray 1 spray by Nasal route daily      latanoprost (XALATAN) 0.005 % ophthalmic solution USE 1 DROP IN BOTH EYES EVERY EVENING      magnesium oxide (MAG-OX) 400 MG tablet Take 1 tablet by mouth 2 times daily      rosuvastatin (CRESTOR) 40 MG tablet Take 1 tablet by mouth 1/2 tab      timolol (TIMOPTIC) 0.25 % ophthalmic solution Apply 1 drop to eye daily

## 2025-06-23 NOTE — PROGRESS NOTES
Hospitalist History and Physical   Admit Date:  2021  7:37 PM   Name:  Mariposa Meade   Age:  77 y.o. Sex:  male  :  1945   MRN:  [id number]     Presenting Complaint: Facial Droop    Reason(s) for Admission: Slurring of speech [R47.81]     History of Present Illness:   Mariposa Meade is a 77 y.o. male with medical history of chronic atrial fibrillation on anticoagulation with Eliquis, history of hypertension history of diabetes type 2 brought to emergency room as patient's daughter-in-law is concerned that patient was having facial droop and slurring in speech. By the time patient came to emergency room patient symptoms looks like improved, when I speak to patient patient able to talk full sentences without any difficulty. Patient symptoms going on since yesterday evening on and off but improved. Evaluated by ER physician, telemetry neurologist also evaluated the patient, recommended inpatient observation to obtain MRI. : Patient is AOx3. As per patient, he feels back to normal.  He did not feel anything abnormal but his family saw some facial droop. He denies any chest pain, as of breath, nausea, vomiting or palpitations. Rest review of system negative except mentioned above. Assessment & Plan:   Principal Problem:    # CVA:    MRI showed right subacute CVA of putamen. CTA showed stenosis of left carotid artery stenosis. Unlikely related. Vascular surgery recommended outpatient work-up. Final PT evaluation pending. Echo pending. Continue Eliquis and Plavix. On Crestor. Appreciate neurology recommendations. #  Dyslipidemia      Continue on home medications. #   Diabetes mellitus type 2, controlled ([facility] [address] [facility] (2016):    Continue on home medications. #  PAF (paroxysmal atrial fibrillation) ([facility] [address] (2018):      Continue on Eliquis and metoprolol. Restarted home flecainide.     Disposition/Discharge Planning: Home with family    Diet: ADULT DIET Patient decompensated overnight with concerns for respiratory failure and worsening renal failures. Discussed with daughter Gemini and her children at bedside this morning regarding patient's very poor clinical prognosis. They are agreeable for full comfort care and full DNR/DNI. Daughter is agreeable for hospice referral. Will place comfort care medications on. Full note to follow. Advanced care planning care time 16 minutes.      Addendum: Paged by RN to talk with daughter and family about goals of care. Daughter now requesting to speak with pulmonologist, cardiologist, and nephrologist to see what else can be done for patient. She does not want her to be comfort care at this point and wants her to be no chest compressions and intubation only, okay for IV medications and cardioversion. Paged intensivist, cardiologist, and nephrologist to reevaluate.    Regular; 3 carb choices (45 gm/meal)  VTE ppx: On DOAC  Code status: Full Code      Patient is AOx3. Wants his wife to be power of  and he wants to be full code. I have called patient's wife. Discussed the current management and plan. She verbalized understanding that patient condition meditated in spite of treatment. Objective:     Patient Vitals for the past 24 hrs:   Temp Pulse Resp BP SpO2   09/02/21 1600 98.1 °F (36.7 °C) 79 18 111/85 94 %   09/02/21 1200 97.5 °F (36.4 °C) 89 19 133/78 97 %   09/02/21 0800 98.2 °F (36.8 °C) 70 18 (!) 143/84 95 %   09/02/21 0400 97.6 °F (36.4 °C) 61 18 (!) 153/89 99 %   09/02/21 0036 97.6 °F (36.4 °C) 73 18 (!) 164/85 97 %   09/01/21 2359  70 18     09/01/21 2300  67 17  98 %   09/01/21 2253  68 20  97 %   09/01/21 2248  69 22  99 %   09/01/21 2220  63 17  98 %   09/01/21 2219  63 13  97 %   09/01/21 2218  63 14 (!) 175/77 97 %   09/01/21 2146    (!) 165/78 98 %   09/01/21 2145  67 18  97 %   09/01/21 2132  65 21 (!) 186/79 98 %   09/01/21 2131  65 13  97 %   09/01/21 2117  66 16 (!) 178/78 98 %   09/01/21 2110  67 16  98 %   09/01/21 2109  64 16  97 %   09/01/21 2102  65 14 (!) 169/81 96 %   09/01/21 2055  66 19 (!) 172/81 98 %   09/01/21 2052  68 15  98 %   09/01/21 2051  67 18  98 %   09/01/21 2047  84      09/01/21 2046  69   98 %   09/01/21 2025  68   97 %   09/01/21 2022  72  (!) 154/82 97 %   09/01/21 2001  71  (!) 177/84 96 %   09/01/21 1957  70  (!) 177/84 97 %   09/01/21 1941  70  (!) 177/84 98 %   09/01/21 1937  71  (!) 176/81 98 %   09/01/21 1904 98.6 °F (37 °C) 75 16 (!) 147/79 95 %     Oxygen Therapy  O2 Sat (%): 94 % (09/02/21 1600)  Pulse via Oximetry: 67 beats per minute (09/01/21 2300)  O2 Device: None (Room air) (09/01/21 1904)    Estimated body mass index is 29.65 kg/m² as calculated from the following:    Height as of this encounter: 5' 8\" (1.727 m).     Weight as of this encounter: 88.5 kg (195 lb). No intake or output data in the 24 hours ending 09/02/21 1742      Physical Exam:    General:    Well nourished. No overt distress  Head:  Normocephalic, atraumatic  Eyes:  Sclerae appear normal.  Pupils equally round. HENT:  Nares appear normal, no drainage. Moist mucous membranes  Neck:  No restricted ROM. Trachea midline  CV:   RRR. No m/r/g. No JVD  Lungs:   CTAB. No wheezing, rhonchi, or rales. Appears even, unlabored  Abdomen: Bowel sounds present. Soft, nontender, nondistended. Extremities: Warm and dry. No cyanosis or clubbing. No edema. Skin:     No rashes. Normal turgor. Normal coloration  Neuro:  Cranial nerves II-XII grossly intact. Sensation intact  Psych:  Normal mood and affect. Alert and oriented x3    Data Ordered and Personally Reviewed:    Last 24hr Labs:  Recent Results (from the past 24 hour(s))   CBC WITH AUTOMATED DIFF    Collection Time: 09/01/21  7:14 PM   Result Value Ref Range    WBC 6.6 4.3 - 11.1 K/uL    RBC 4.23 4.23 - 5.6 M/uL    HGB 13.8 13.6 - 17.2 g/dL    HCT 40.0 (L) 41.1 - 50.3 %    MCV 94.6 79.6 - 97.8 FL    MCH 32.6 26.1 - 32.9 PG    MCHC 34.5 31.4 - 35.0 g/dL    RDW 12.5 11.9 - 14.6 %    PLATELET 456 871 - 244 K/uL    MPV 9.7 9.4 - 12.3 FL    ABSOLUTE NRBC 0.00 0.0 - 0.2 K/uL    DF AUTOMATED      NEUTROPHILS 64 43 - 78 %    LYMPHOCYTES 21 13 - 44 %    MONOCYTES 12 4.0 - 12.0 %    EOSINOPHILS 2 0.5 - 7.8 %    BASOPHILS 1 0.0 - 2.0 %    IMMATURE GRANULOCYTES 0 0.0 - 5.0 %    ABS. NEUTROPHILS 4.3 1.7 - 8.2 K/UL    ABS. LYMPHOCYTES 1.4 0.5 - 4.6 K/UL    ABS. MONOCYTES 0.8 0.1 - 1.3 K/UL    ABS. EOSINOPHILS 0.1 0.0 - 0.8 K/UL    ABS. BASOPHILS 0.0 0.0 - 0.2 K/UL    ABS. IMM.  GRANS. 0.0 0.0 - 0.5 K/UL   METABOLIC PANEL, BASIC    Collection Time: 09/01/21  7:14 PM   Result Value Ref Range    Sodium 131 (L) 136 - 145 mmol/L    Potassium 4.5 3.5 - 5.1 mmol/L    Chloride 99 98 - 107 mmol/L    CO2 27 21 - 32 mmol/L    Anion gap 5 (L) 7 - 16 mmol/L Glucose 141 (H) 65 - 100 mg/dL    BUN 18 8 - 23 MG/DL    Creatinine 0.99 0.8 - 1.5 MG/DL    GFR est AA >60 >60 ml/min/1.73m2    GFR est non-AA >60 >60 ml/min/1.73m2    Calcium 9.8 8.3 - 10.4 MG/DL   PROTHROMBIN TIME + INR    Collection Time: 09/01/21  7:14 PM   Result Value Ref Range    Prothrombin time 13.9 12.6 - 14.5 sec    INR 1.0     COVID-19 RAPID TEST    Collection Time: 09/01/21 10:59 PM   Result Value Ref Range    Specimen source NASAL      COVID-19 rapid test Not detected NOTD     SARS-COV-2    Collection Time: 09/01/21 10:59 PM   Result Value Ref Range    SARS-CoV-2 Please find results under separate order     SARS-COV-2, PCR    Collection Time: 09/01/21 10:59 PM    Specimen: Nasopharyngeal   Result Value Ref Range    Specimen source Nasopharyngeal      SARS-CoV-2 Not detected NOTD     GLUCOSE, POC    Collection Time: 09/02/21  1:21 AM   Result Value Ref Range    Glucose (POC) 152 (H) 65 - 100 mg/dL    Performed by Yoselin    LIPID PANEL    Collection Time: 09/02/21  4:19 AM   Result Value Ref Range    Cholesterol, total 126 <200 MG/DL    Triglyceride 54 35 - 150 MG/DL    HDL Cholesterol 53 40 - 60 MG/DL    LDL, calculated 62.2 <100 MG/DL    VLDL, calculated 10.8 6.0 - 23.0 MG/DL    CHOL/HDL Ratio 2.4     HEMOGLOBIN A1C WITH EAG    Collection Time: 09/02/21  4:19 AM   Result Value Ref Range    Hemoglobin A1c 6.6 (H) 4.2 - 6.3 %    Est. average glucose 143 mg/dL   GLUCOSE, POC    Collection Time: 09/02/21  6:15 AM   Result Value Ref Range    Glucose (POC) 85 65 - 100 mg/dL    Performed by ColeenrmSkylar    TSH 3RD GENERATION    Collection Time: 09/02/21  8:37 AM   Result Value Ref Range    TSH 0.686 0.358 - 3.740 uIU/mL   T4, FREE    Collection Time: 09/02/21  8:37 AM   Result Value Ref Range    T4, Free 1.2 0.78 - 7.36 NG/DL   METABOLIC PANEL, COMPREHENSIVE    Collection Time: 09/02/21  8:37 AM   Result Value Ref Range    Sodium 132 (L) 138 - 145 mmol/L    Potassium 4.3 3.5 - 5.1 mmol/L    Chloride 101 98 - 107 mmol/L    CO2 25 21 - 32 mmol/L    Anion gap 6 (L) 7 - 16 mmol/L    Glucose 153 (H) 65 - 100 mg/dL    BUN 14 8 - 23 MG/DL    Creatinine 0.81 0.8 - 1.5 MG/DL    GFR est AA >60 >60 ml/min/1.73m2    GFR est non-AA >60 >60 ml/min/1.73m2    Calcium 9.3 8.3 - 10.4 MG/DL    Bilirubin, total 0.6 0.2 - 1.1 MG/DL    ALT (SGPT) 22 12 - 65 U/L    AST (SGOT) 19 15 - 37 U/L    Alk. phosphatase 91 50 - 136 U/L    Protein, total 6.7 6.3 - 8.2 g/dL    Albumin 3.7 3.2 - 4.6 g/dL    Globulin 3.0 2.3 - 3.5 g/dL    A-G Ratio 1.2 1.2 - 3.5     GLUCOSE, POC    Collection Time: 09/02/21 11:42 AM   Result Value Ref Range    Glucose (POC) 154 (H) 65 - 100 mg/dL    Performed by Zishiloh Linevelasquez    GLUCOSE, POC    Collection Time: 09/02/21  4:32 PM   Result Value Ref Range    Glucose (POC) 114 (H) 65 - 100 mg/dL    Performed by Zigmadriel Rios        All Micro Results     Procedure Component Value Units Date/Time    SARS-COV-2, PCR [[id number]] Collected: 09/01/21 2259    Order Status: Completed Specimen: Nasopharyngeal Updated: 09/02/21 0834     Specimen source Nasopharyngeal        SARS-CoV-2 Not detected        Comment:      The specimen is NEGATIVE for SARS-CoV-2, the novel coronavirus associated with COVID-19. This test has been authorized by the FDA under an Emergency Use Authorization (EUA) for use by authorized laboratories. Fact sheet for Healthcare Providers: ConventionUpdate.co.nz       Fact sheet for Patients: ConventionUpdate.co.nz       Methodology: RT-PCR         COVID-19 RAPID TEST [[id number]] Collected: 09/01/21 2259    Order Status: Completed Specimen: Nasopharyngeal Updated: 09/01/21 2333     Specimen source NASAL        COVID-19 rapid test Not detected        Comment:      The specimen is NEGATIVE for SARS-CoV-2, the novel coronavirus associated with COVID-19. A negative result does not rule out COVID-19.        This test has been authorized by the FDA under an Emergency Use Authorization (EUA) for use by authorized laboratories. Fact sheet for Healthcare Providers: ConventionUpdate.co.nz  Fact sheet for Patients: ConventionUpdate.co.nz       Methodology: Isothermal Nucleic Acid Amplification               Other Studies:  MRI BRAIN WO CONT    Result Date: 9/2/2021  Exam: MRI BRAIN WO CONT on 9/2/2021 1:16 PM Clinical History: The Male patient is 77years old presenting for facial droop and slurring in speech. No hx of surgery or cancer. CT on PACS. Comparison:  Head CT 9/1/2021 Technique:  Axial T2, axial FLAIR, axial diffusion-weighted,  sagittal T1 and coronal gradient-echo scans were performed. Findings: Focal subacute ischemic changes are seen involving the posterior margin of the right putamen. There are otherwise age-related cortical involutional changes with minimal periventricular white matter disease including chronic lacunae throughout the corona radiata and centrum semiovale. There are no abnormal extra-axial fluid collections. No evidence of mass or mass effect is seen. There is no diffusion signal abnormality. Expected flow voids are maintained in the major intracranial vessels. Cerebellar involutional changes. There is no evidence of Chiari malformation. The ventricular system and CSF containing spaces are unremarkable in appearance. Visualized extracranial soft tissues are unremarkable. There is a right frontal scalp soft tissue defect. The paranasal sinuses are well pneumatized and aerated. 1. Subacute ischemic changes involving the right putamen 2. Otherwise chronic white matter changes and cortical involution. SIGNIFICANT RESULT: The significant findings in this report have been referred to the Imaging Navigator in order to communicate to the referring provider or his/her designee as outlined in Section II.C.2.a.ii-iii of the ACR Practice Guideline for Communication of Diagnostic Imaging Findings. CPT code(s) K0279220     CT HEAD WO CONT    Result Date: 9/1/2021  Noncontrast CT of the brain. COMPARISON: none INDICATION: Acute neuro changes. Slurred speech and facial droop. TECHNIQUE: Contiguous axial images were obtained from the skull base through the vertex without IV contrast. Radiation dose reduction techniques were used for this study:  Our CT scanners use one or all of the following: Automated exposure control, adjustment of the mA and/or kVp according to patient's size, iterative reconstruction. FINDINGS: There is no acute intracranial hemorrhage or evidence for acute territorial infarction. There is no mass effect, midline shift or hydrocephalus. There is no extra-axial fluid collection. The cerebellum and brainstem are grossly unremarkable. Periventricular diffuse hypodensities are nonspecific and likely secondary to chronic small vessel changes. Decreased attenuation in the internal capsules, likely related to small vessel changes. Included globes appear intact. Paranasal sinuses and the mastoid air cells are aerated. There is no skull fracture. 1. No acute intracranial hemorrhage or CT evidence of acute territorial infarction. Note that MRI is more sensitive for detection of acute/subacute infarct and could be considered. 2. Periventricular chronic small vessel changes. CTA HEAD NECK W WO CONT    Result Date: 9/2/2021  History: Slurred speech. Facial droop. Comments: CT ANGIOGRAM OF THE NECK AND CT ANGIOGRAM OF THE [place] [facility] [place] was obtained following the administration of IV contrast. IV contrast was administered to evaluate the arterial vasculature. Reformatted images in the coronal and sagittal planes as well as 3-D imaging was obtained and reviewed on a dedicated PACS and [address]. Radiation reduction dose techniques were used for the study.  Our CT scanner use one or all of the following- Automated exposure control, adjustment of the mA and/or KV according the patient size, iterative reconstruction. All measurements are based upon NASCET criteria if appropriate. Findings: CT ANGIOGRAM OF THE NECK: The arch and proximal great vessels are patent with mild atherosclerotic changes. The right carotid bulb demonstrates concentric calcified plaque disease however degree of stenosis is less than 50%. The left carotid bulb demonstrates eccentric calcified plaque disease. Degree of stenosis is less than 50% however the proximal left internal carotid artery demonstrates near occlusive short segment stenosis. The vessel reconstitutes shortly thereafter. The internal carotid arteries are tortuous. Atherosclerotic changes of the vertebral arteries are present with probable significant narrowing at the origin on the left. The right is dominant. The lung apices are clear. Thyroid gland is unremarkable. CT ANGIOGRAM OF [place] OF [place]: The petrous, cavernous, and supraclinoid internal carotid arteries are patent with atherosclerotic changes in the supraclinoid portions. The A1 segment of the anterior circulation on the left is diminutive in size. The middle cerebral arteries are patent. The distal vertebral arteries demonstrate atherosclerotic changes at the V4 segment on the right. The left is diminutive. The basilar artery and posterior cerebral arteries are patent. The dural venous sinuses are patent. 1. Short segment near occlusive stenosis of the proximal left internal carotid artery. The vessel reconstitutes flow shortly thereafter. This finding was relayed to the patient's nurse upon interpretation               Signed:  Emilia Vergara MD    Part of this note may have been written by using a voice dictation software. The note has been proof read but may still contain some grammatical/other typographical errors.

## (undated) DEVICE — DRAPE,U/SHT,SPLIT,FILM,60X84,STERILE: Brand: MEDLINE

## (undated) DEVICE — MAGNETIC DRAPE: Brand: DEVON

## (undated) DEVICE — SUTURE PROL SZ 6-0 L24IN NONABSORBABLE BLU BV-1 L9.3MM 3/8 M8805

## (undated) DEVICE — SYR 10ML LUER LOK 1/5ML GRAD --

## (undated) DEVICE — SUTURE VCRL SZ 2-0 L36IN ABSRB UD L36MM CT-1 1/2 CIR J945H

## (undated) DEVICE — AGENT HEMSTAT W4XL4IN OXIDIZED REGENERATED CELOS ABSRB SFT

## (undated) DEVICE — SOLUTION IRRIG 1000ML 09% SOD CHL USP PIC PLAS CONTAINER

## (undated) DEVICE — NEEDLE HYPO 25GA L1.5IN BLU POLYPR HUB S STL REG BVL STR

## (undated) DEVICE — GOWN,REINF,POLY,ECL,PP SLV,XL: Brand: MEDLINE

## (undated) DEVICE — SUTURE MCRYL SZ 4-0 L27IN ABSRB UD L19MM PS-2 1/2 CIR PRIM Y426H

## (undated) DEVICE — CAROTID DR WILLIAMS: Brand: MEDLINE INDUSTRIES, INC.

## (undated) DEVICE — INTENDED FOR TISSUE SEPARATION, AND OTHER PROCEDURES THAT REQUIRE A SHARP SURGICAL BLADE TO PUNCTURE OR CUT.: Brand: BARD-PARKER SAFETY BLADES SIZE 11, STERILE

## (undated) DEVICE — ADHESIVE SKIN CLSR 1ML TISS HI VISC EXOFIN

## (undated) DEVICE — GOWN,REINFORCED,POLY,AURORA,XXLARGE,STR: Brand: MEDLINE

## (undated) DEVICE — APPLIER CLP L9.375IN APER 2.1MM CLS L3.8MM 20 SM TI CLP

## (undated) DEVICE — INTENDED USED TO PROTECT, TAG AND HELP LOCATED SUTURES DURING SURGERY: Brand: STERION®SUTURE AID BOOTIES

## (undated) DEVICE — 3M™ IOBAN™ 2 ANTIMICROBIAL INCISE DRAPE 6650EZ: Brand: IOBAN™ 2

## (undated) DEVICE — TUBING, SUCTION, 1/4" X 10', STRAIGHT: Brand: MEDLINE

## (undated) DEVICE — 1840 FOAM BLOCK NEEDLE COUNTER: Brand: DEVON

## (undated) DEVICE — Device

## (undated) DEVICE — TAPE UMB 1/8X30IN MP COT WHT --

## (undated) DEVICE — GAUZE,SPONGE,8"X4",12PLY,XRAY,STRL,LF: Brand: MEDLINE

## (undated) DEVICE — SUTURE PROL SZ 6-0 L24IN NONABSORBABLE BLU L13MM C-1 3/8 8726H

## (undated) DEVICE — DECANTER BAG 9": Brand: MEDLINE INDUSTRIES, INC.

## (undated) DEVICE — DRAPE IRRIG FLD WRM W44XL44IN W/ AORN STD PRTBL INTRATEMP

## (undated) DEVICE — GARMENT,MEDLINE,DVT,INT,CALF,MED, GEN2: Brand: MEDLINE

## (undated) DEVICE — COVER,LIGHT HANDLE,FLX,2/PK: Brand: MEDLINE INDUSTRIES, INC.

## (undated) DEVICE — INTENDED TO BE USED TO OCCLUDE, RETRACT AND IDENTIFY ARTERIES, VEINS, TENDONS AND NERVES IN SURGICAL PROCEDURES: Brand: STERION®  VESSEL LOOP

## (undated) DEVICE — SPONGE LAP 18X18IN STRL -- 5/PK

## (undated) DEVICE — BLADE ASSEMB CLP HAIR FINE --

## (undated) DEVICE — SUTURE PERMAHAND SZ 2-0 L12X18IN NONABSORBABLE BLK SILK A185H

## (undated) DEVICE — SUTURE VCRL SZ 3-0 L27IN ABSRB UD L26MM SH 1/2 CIR J416H

## (undated) DEVICE — GEL US 20GM NONIRRITATING OVERWRAPPED FILE PCH TRNSMIT

## (undated) DEVICE — REM POLYHESIVE ADULT PATIENT RETURN ELECTRODE: Brand: VALLEYLAB

## (undated) DEVICE — DISH PTRI STRL --

## (undated) DEVICE — INTENDED FOR TISSUE SEPARATION, AND OTHER PROCEDURES THAT REQUIRE A SHARP SURGICAL BLADE TO PUNCTURE OR CUT.: Brand: BARD-PARKER SAFETY BLADES SIZE 15, STERILE

## (undated) DEVICE — CLOTH PRE OP W9XL10.5IN 2% CHG FRAGRANCE RNS FREE READYPREP

## (undated) DEVICE — SKIN MARKER,REGULAR TIP WITH RULER AND LABELS: Brand: DEVON

## (undated) DEVICE — SUTURE PERMAHAND SZ 3-0 L18IN NONABSORBABLE BLK SILK BRAID A184H

## (undated) DEVICE — STANDARD HYPODERMIC NEEDLE,POLYPROPYLENE HUB: Brand: MONOJECT

## (undated) DEVICE — UNIVERSAL DRAPES: Brand: MEDLINE INDUSTRIES, INC.

## (undated) DEVICE — COVER,MAYO STAND,STERILE: Brand: MEDLINE

## (undated) DEVICE — PREP SKN CHLRAPRP APL 26ML STR --

## (undated) DEVICE — SUTURE NONABSORBABLE MONOFILAMENT 5-0 C-1 1X24 IN PROLENE 8725H

## (undated) DEVICE — SYR LR LCK 1ML GRAD NSAF 30ML --

## (undated) DEVICE — DRAPE TWL SURG 16X26IN BLU ORB04] ALLCARE INC]

## (undated) DEVICE — GOWN,PREVENTION PLUS,2XL,ST,22/CS: Brand: MEDLINE

## (undated) DEVICE — DRAPE,INSTRUMENT,MAGNETIC,10X16: Brand: MEDLINE

## (undated) DEVICE — WIPE INSTR HIGH ABSORBENT FAST WICKING LINT FREE COUNT 20

## (undated) DEVICE — BASIC SINGLE BASIN-LF: Brand: MEDLINE INDUSTRIES, INC.

## (undated) DEVICE — 2000CC GUARDIAN II: Brand: GUARDIAN

## (undated) DEVICE — SYRINGE IRRIG 60ML SFT PLIABLE BLB EZ TO GRP 1 HND USE W/